# Patient Record
Sex: FEMALE | Race: WHITE | Employment: OTHER | URBAN - METROPOLITAN AREA
[De-identification: names, ages, dates, MRNs, and addresses within clinical notes are randomized per-mention and may not be internally consistent; named-entity substitution may affect disease eponyms.]

---

## 2017-01-20 PROBLEM — I25.10 CORONARY ARTERY DISEASE INVOLVING NATIVE CORONARY ARTERY OF NATIVE HEART WITHOUT ANGINA PECTORIS: Status: ACTIVE | Noted: 2017-01-20

## 2017-01-20 PROBLEM — R01.1 HEART MURMUR: Status: ACTIVE | Noted: 2017-01-20

## 2017-01-20 PROBLEM — I10 ESSENTIAL HYPERTENSION: Status: ACTIVE | Noted: 2017-01-20

## 2017-01-20 PROBLEM — N18.9 CKD (CHRONIC KIDNEY DISEASE): Status: ACTIVE | Noted: 2017-01-20

## 2017-01-20 PROBLEM — R07.89 CHEST PAIN, ATYPICAL: Status: ACTIVE | Noted: 2017-01-20

## 2017-02-24 PROBLEM — Z88.8 ASPIRIN ALLERGY: Status: ACTIVE | Noted: 2017-02-24

## 2017-02-24 PROBLEM — Z88.8 ALLERGY TO STATIN MEDICATION: Status: ACTIVE | Noted: 2017-02-24

## 2017-11-02 PROBLEM — E66.811 CLASS 1 OBESITY DUE TO EXCESS CALORIES WITHOUT SERIOUS COMORBIDITY IN ADULT: Status: ACTIVE | Noted: 2017-11-02

## 2017-11-02 PROBLEM — I31.39 PERICARDIAL EFFUSION: Status: ACTIVE | Noted: 2017-11-02

## 2017-11-02 PROBLEM — E66.09 CLASS 1 OBESITY DUE TO EXCESS CALORIES WITHOUT SERIOUS COMORBIDITY IN ADULT: Status: ACTIVE | Noted: 2017-11-02

## 2017-11-02 PROBLEM — I34.0 NON-RHEUMATIC MITRAL REGURGITATION: Status: ACTIVE | Noted: 2017-11-02

## 2018-09-05 ENCOUNTER — OFFICE VISIT (OUTPATIENT)
Dept: CARDIOLOGY CLINIC | Age: 73
End: 2018-09-05
Payer: MEDICARE

## 2018-09-05 VITALS
DIASTOLIC BLOOD PRESSURE: 60 MMHG | HEART RATE: 60 BPM | WEIGHT: 209 LBS | SYSTOLIC BLOOD PRESSURE: 132 MMHG | HEIGHT: 64 IN | RESPIRATION RATE: 18 BRPM | BODY MASS INDEX: 35.68 KG/M2

## 2018-09-05 DIAGNOSIS — R60.0 EDEMA OF BOTH FEET: ICD-10-CM

## 2018-09-05 DIAGNOSIS — I34.0 NON-RHEUMATIC MITRAL REGURGITATION: ICD-10-CM

## 2018-09-05 DIAGNOSIS — Z88.8 ALLERGY TO STATIN MEDICATION: ICD-10-CM

## 2018-09-05 DIAGNOSIS — I31.39 PERICARDIAL EFFUSION: ICD-10-CM

## 2018-09-05 DIAGNOSIS — E66.9 NON MORBID OBESITY: ICD-10-CM

## 2018-09-05 DIAGNOSIS — I10 ESSENTIAL HYPERTENSION: Primary | ICD-10-CM

## 2018-09-05 PROCEDURE — 1036F TOBACCO NON-USER: CPT | Performed by: INTERNAL MEDICINE

## 2018-09-05 PROCEDURE — G8400 PT W/DXA NO RESULTS DOC: HCPCS | Performed by: INTERNAL MEDICINE

## 2018-09-05 PROCEDURE — 1090F PRES/ABSN URINE INCON ASSESS: CPT | Performed by: INTERNAL MEDICINE

## 2018-09-05 PROCEDURE — 1123F ACP DISCUSS/DSCN MKR DOCD: CPT | Performed by: INTERNAL MEDICINE

## 2018-09-05 PROCEDURE — 3017F COLORECTAL CA SCREEN DOC REV: CPT | Performed by: INTERNAL MEDICINE

## 2018-09-05 PROCEDURE — G8427 DOCREV CUR MEDS BY ELIG CLIN: HCPCS | Performed by: INTERNAL MEDICINE

## 2018-09-05 PROCEDURE — 4040F PNEUMOC VAC/ADMIN/RCVD: CPT | Performed by: INTERNAL MEDICINE

## 2018-09-05 PROCEDURE — 93000 ELECTROCARDIOGRAM COMPLETE: CPT | Performed by: INTERNAL MEDICINE

## 2018-09-05 PROCEDURE — 1101F PT FALLS ASSESS-DOCD LE1/YR: CPT | Performed by: INTERNAL MEDICINE

## 2018-09-05 PROCEDURE — 99214 OFFICE O/P EST MOD 30 MIN: CPT | Performed by: INTERNAL MEDICINE

## 2018-09-05 PROCEDURE — G8417 CALC BMI ABV UP PARAM F/U: HCPCS | Performed by: INTERNAL MEDICINE

## 2018-09-05 NOTE — PROGRESS NOTES
OFFICE VISIT     PRIMARY CARE PHYSICIAN:      Gurvinder Mcneal DO       ALLERGIES / SENSITIVITIES:        Allergies   Allergen Reactions    Latex     Aspirin     Andrés-Allergin [Diphenhydramine]     Darvocet A500 [Propoxyphene N-Acetaminophen]     Fish-Derived Products     Honey Bee Venom [Bee Venom]     Lemon Oil     Niacin And Related     Nuts [Peanut Oil]     Penicillins     Shellfish-Derived Products     Wine [Alcohol]      Beer, wine and diet pop          REVIEWED MEDICATIONS:        Current Outpatient Prescriptions:     cetirizine (ZYRTEC) 10 MG tablet, Take 10 mg by mouth daily as needed for Allergies, Disp: , Rfl:     carvedilol (COREG) 12.5 MG tablet, Take 12.5 mg by mouth 2 times daily, Disp: , Rfl:     amLODIPine (NORVASC) 10 MG tablet, Take 10 mg by mouth daily, Disp: , Rfl:       S: REASON FOR VISIT:       Chief Complaint   Patient presents with    Cardiac Valve Problem     10 mo ov    Hypertension          History of Present Illness:       Office Visit for follow up of VHD, HTN, Pericardial effusion     No hospitalizations or surgeries since last visit   Rob any exertional chest pain or short of breath   No palpitations, dizzy or syncope. Active at home   No orthopnea   Try to watch diet   Compliant with all medications       Past Medical History:   Diagnosis Date    Hyperlipidemia     Hypertension     Thyroid disease             Past Surgical History:   Procedure Laterality Date    CARPAL TUNNEL RELEASE      CATARACT REMOVAL      CHOLECYSTECTOMY      FRACTURE SURGERY      HYSTERECTOMY      SKIN CANCER EXCISION      rt arm        No family history on file.        Social History   Substance Use Topics    Smoking status: Never Smoker    Smokeless tobacco: Never Used    Alcohol use No      Comment: drinks 2 cups of coffee daily         Review of Systems:  HEENT: negative for acute visual symptoms or auditory problems, no dysphagia  Constitutional: negative for fever and chills, or significant weight loss  Respiratory: negative for cough, wheezing, or hemoptysis  Cardiovascular: negative for chest pain, palpitations, and dyspnea  Gastrointestinal: negative for abdominal pain, diarrhea, nausea and vomiting  Endocrine: Negative for polyuria and polydyspsia  Genitourinary:negative for dysuria and hematuria  Derm: negative for rash and skin lesion(s)  Neurological: negative for tingling, numbness, weakness, seizures and tremors  Endocrine: negative for polydipsia and polyuria  Musculoskeletal: negative for pain or tenderness  Psychiatric: negative for anxiety, depression, or suicidal ideations         O:  COMPLETE PHYSICAL EXAM:       /60   Pulse 60   Resp 18   Ht 5' 4\" (1.626 m)   Wt 209 lb (94.8 kg)   BMI 35.87 kg/m²       General:   Patient alert, comfortable, no distress. Appears stated age. HEENT:    Pupils equal, no icterus, no nasal drainage, tongue moist.   Neck:              No masses, Thyroid not palpable. Chest:   Normal configuration, non tender. Lungs:   Clear to auscultation bilaterally, few scattered rhonchi. Cardiovascular:  Regular rhythm, 1/6 systolic murmur, No S3, no palpable thrills, No elevated JVD, No carotid bruit. Abdomen:  Soft, Non tender, Bowel sounds normal, no pulsatile abdominal aorta,. Extremities:  Nonpitting edema. Distal pulses palpable. No cyanosis, no clubbing. Skin:   Good turgor, warm and dry, no cyanosis. Musculoskeletal: No joint swelling or deformity. Neuro:   Cranial nerves grossly intact; No focal neurologic deficit. Psych:   Alert, good mood and effect. REVIEW OF DIAGNOSTIC TESTS:        Electrocardiogram: NSR            A/P:   ASSESSMENT / PLAN:    Jono Flores was seen today for cardiac valve problem and hypertension.     Diagnoses and all orders for this visit:       Non-rheumatic mitral regurgitation, Mild, stable   -     EKG 12 Lead    Pericardial effusion, Small     Edema of both feet, chronic; elevated feet, decrease salt     Aspirin allergy     Allergy to statin medication    Non morbid obesity : Diet, exercise and weight loss discussed. Preventive Cardiology: Low cholesterol diet, regular exercise as tolerate, and gradual weight loss discussed. Monitor BP and heart rates. All questions answered about cardiac diagnoses and cardiac medications. Continue current medications. Compliance with medications and f/u with all physicians discussed. Risk factor modification based on risk profile discussed. Call if any exertional chest pain, short of breath, dizzy or palpitations   Follow up in 9 months or earlier if needed.          [facility]  [address], [place], [address] [place] [address]  [phone]

## 2019-04-18 ENCOUNTER — OFFICE VISIT (OUTPATIENT)
Dept: CARDIOLOGY CLINIC | Age: 74
End: 2019-04-18
Payer: MEDICARE

## 2019-04-18 VITALS
DIASTOLIC BLOOD PRESSURE: 70 MMHG | HEIGHT: 64 IN | BODY MASS INDEX: 37.22 KG/M2 | HEART RATE: 60 BPM | WEIGHT: 218 LBS | SYSTOLIC BLOOD PRESSURE: 128 MMHG

## 2019-04-18 DIAGNOSIS — I34.0 NON-RHEUMATIC MITRAL REGURGITATION: ICD-10-CM

## 2019-04-18 DIAGNOSIS — R60.0 EDEMA OF BOTH FEET: ICD-10-CM

## 2019-04-18 DIAGNOSIS — I31.39 PERICARDIAL EFFUSION: ICD-10-CM

## 2019-04-18 DIAGNOSIS — I10 ESSENTIAL HYPERTENSION: Primary | ICD-10-CM

## 2019-04-18 DIAGNOSIS — E66.9 NON MORBID OBESITY: ICD-10-CM

## 2019-04-18 DIAGNOSIS — N18.9 CHRONIC KIDNEY DISEASE, UNSPECIFIED CKD STAGE: ICD-10-CM

## 2019-04-18 PROCEDURE — G8400 PT W/DXA NO RESULTS DOC: HCPCS | Performed by: INTERNAL MEDICINE

## 2019-04-18 PROCEDURE — G8427 DOCREV CUR MEDS BY ELIG CLIN: HCPCS | Performed by: INTERNAL MEDICINE

## 2019-04-18 PROCEDURE — 1036F TOBACCO NON-USER: CPT | Performed by: INTERNAL MEDICINE

## 2019-04-18 PROCEDURE — 99214 OFFICE O/P EST MOD 30 MIN: CPT | Performed by: INTERNAL MEDICINE

## 2019-04-18 PROCEDURE — G8417 CALC BMI ABV UP PARAM F/U: HCPCS | Performed by: INTERNAL MEDICINE

## 2019-04-18 PROCEDURE — 4040F PNEUMOC VAC/ADMIN/RCVD: CPT | Performed by: INTERNAL MEDICINE

## 2019-04-18 PROCEDURE — 1123F ACP DISCUSS/DSCN MKR DOCD: CPT | Performed by: INTERNAL MEDICINE

## 2019-04-18 PROCEDURE — 3017F COLORECTAL CA SCREEN DOC REV: CPT | Performed by: INTERNAL MEDICINE

## 2019-04-18 PROCEDURE — 93000 ELECTROCARDIOGRAM COMPLETE: CPT | Performed by: INTERNAL MEDICINE

## 2019-04-18 PROCEDURE — 1090F PRES/ABSN URINE INCON ASSESS: CPT | Performed by: INTERNAL MEDICINE

## 2019-04-18 NOTE — PROGRESS NOTES
OFFICE VISIT     PRIMARY CARE PHYSICIAN:      Bettina Finn DO       ALLERGIES / SENSITIVITIES:        Allergies   Allergen Reactions    Latex     Aspirin     Andrés-Allergin [Diphenhydramine]     Darvocet A500 [Propoxyphene N-Acetaminophen]     Fish-Derived Products     Honey Bee Venom [Bee Venom]     Lemon Oil     Niacin And Related     Nuts [Peanut Oil]     Penicillins     Shellfish-Derived Products     Wine [Alcohol]      Beer, wine and diet pop          REVIEWED MEDICATIONS:        Current Outpatient Medications:     cetirizine (ZYRTEC) 10 MG tablet, Take 10 mg by mouth daily as needed for Allergies, Disp: , Rfl:     carvedilol (COREG) 12.5 MG tablet, Take 12.5 mg by mouth 2 times daily, Disp: , Rfl:     amLODIPine (NORVASC) 10 MG tablet, Take 10 mg by mouth daily, Disp: , Rfl:       S: REASON FOR VISIT:       Chief Complaint   Patient presents with    Hypertension     9 month. Pt has no cardiac complaints today          History of Present Illness:       Office Visit for follow up of HTN, VHDF, Edema     No hospitalizations or surgeries since last visit     Rob any exertional chest pain or short of breath   No palpitations, dizzy or syncope. Active at home   No orthopnea   Try to watch diet   Compliant with all medications       Past Medical History:   Diagnosis Date    Hyperlipidemia     Hypertension     Thyroid disease             Past Surgical History:   Procedure Laterality Date    CARPAL TUNNEL RELEASE      CATARACT REMOVAL      CHOLECYSTECTOMY      FRACTURE SURGERY      HYSTERECTOMY      SKIN CANCER EXCISION      rt arm        History reviewed. No pertinent family history.        Social History     Tobacco Use    Smoking status: Never Smoker    Smokeless tobacco: Never Used   Substance Use Topics    Alcohol use: No     Comment: drinks 2 cups of coffee daily         Review of Systems:  HEENT: negative for acute visual symptoms or auditory problems, no dysphagia  Constitutional: negative for fever and chills, or significant weight loss  Respiratory: negative for cough, wheezing, or hemoptysis  Cardiovascular: negative for chest pain, palpitations, and dyspnea  Gastrointestinal: negative for abdominal pain, diarrhea, nausea and vomiting  Endocrine: Negative for polyuria and polydyspsia  Genitourinary:negative for dysuria and hematuria  Derm: negative for rash and skin lesion(s)  Neurological: negative for tingling, numbness, weakness, seizures and tremors  Endocrine: negative for polydipsia and polyuria  Musculoskeletal: negative for pain or tenderness  Psychiatric: negative for anxiety, depression, or suicidal ideations         O:  COMPLETE PHYSICAL EXAM:       /70   Pulse 60   Ht 5' 4\" (1.626 m)   Wt 218 lb (98.9 kg)   BMI 37.42 kg/m²       General:   Patient alert, comfortable, no distress. Appears stated age. HEENT:    Pupils equal, no icterus, no nasal drainage, tongue moist.   Neck:              No masses, Thyroid not palpable. Chest:   Normal configuration, non tender. Lungs:   Clear to auscultation bilaterally, few scattered rhonchi. Cardiovascular:  Regular rhythm, 1/6 systolic murmur, No S3, no palpable thrills, No elevated JVD, No carotid bruit. Abdomen:  Soft, Non tender, Bowel sounds normal, no pulsatile abdominal aorta, no palpable masses. Extremities:  + edema. Distal pulses palpable. No cyanosis, no clubbing. Skin:   Good turgor, warm and dry, no cyanosis. Musculoskeletal: No joint swelling or deformity. Neuro:   Cranial nerves grossly intact; No focal neurologic deficit. Psych:   Alert, good mood and effect. REVIEW OF DIAGNOSTIC TESTS:        Electrocardiogram: NSR, nonspecific ST/T changes            A/P:   ASSESSMENT / PLAN:    Niraj Segundo was seen today for hypertension.     Diagnoses and all orders for this visit:    Essential hypertension - Stable  -     EKG 12 Lead    Non-rheumatic mitral regurgitation - Mild, Echo 2/3017 - Stable    Pericardial effusion - Small, - Stable    Edema of both feet - Chronic - Elevate feet, decrease salt intake    Non morbid obesity - Diet, exercise and weight loss discussed. Chronic kidney disease, unspecified CKD stage    Preventive Cardiology: Low cholesterol diet, regular exercise as tolerate, and gradual weight loss discussed. Monitor BP and heart rates. All questions answered about cardiac diagnoses and cardiac medications. Continue current medications. Compliance with medications and f/u with all physicians discussed. Risk factor modification based on risk profile discussed. Call if any exertional chest pain, short of breath, dizzy or palpitations   Follow up in 12  months or earlier if needed.          [facility]  [address] [facility] [address], [place], [address] [facility]  [phone]

## 2021-05-05 ENCOUNTER — APPOINTMENT (OUTPATIENT)
Dept: GENERAL RADIOLOGY | Age: 76
DRG: 247 | End: 2021-05-05
Payer: MEDICARE

## 2021-05-05 ENCOUNTER — HOSPITAL ENCOUNTER (INPATIENT)
Age: 76
LOS: 2 days | Discharge: HOME | DRG: 247 | End: 2021-05-08
Attending: EMERGENCY MEDICINE | Admitting: FAMILY MEDICINE
Payer: MEDICARE

## 2021-05-05 DIAGNOSIS — R07.9 CHEST PAIN, UNSPECIFIED TYPE: Primary | ICD-10-CM

## 2021-05-05 LAB
ALBUMIN SERPL-MCNC: 4 G/DL (ref 3.5–5.2)
ALP BLD-CCNC: 81 U/L (ref 35–104)
ALT SERPL-CCNC: 12 U/L (ref 0–32)
ANION GAP SERPL CALCULATED.3IONS-SCNC: 10 MMOL/L (ref 7–16)
AST SERPL-CCNC: 22 U/L (ref 0–31)
BASOPHILS ABSOLUTE: 0.03 E9/L (ref 0–0.2)
BASOPHILS RELATIVE PERCENT: 0.4 % (ref 0–2)
BILIRUB SERPL-MCNC: <0.2 MG/DL (ref 0–1.2)
BUN BLDV-MCNC: 18 MG/DL (ref 6–23)
CALCIUM SERPL-MCNC: 8.9 MG/DL (ref 8.6–10.2)
CHLORIDE BLD-SCNC: 105 MMOL/L (ref 98–107)
CO2: 21 MMOL/L (ref 22–29)
CREAT SERPL-MCNC: 1 MG/DL (ref 0.5–1)
EOSINOPHILS ABSOLUTE: 0.3 E9/L (ref 0.05–0.5)
EOSINOPHILS RELATIVE PERCENT: 4 % (ref 0–6)
GFR AFRICAN AMERICAN: >60
GFR NON-AFRICAN AMERICAN: 54 ML/MIN/1.73
GLUCOSE BLD-MCNC: 168 MG/DL (ref 74–99)
HCT VFR BLD CALC: 38.3 % (ref 34–48)
HEMOGLOBIN: 12.3 G/DL (ref 11.5–15.5)
IMMATURE GRANULOCYTES #: 0.03 E9/L
IMMATURE GRANULOCYTES %: 0.4 % (ref 0–5)
INR BLD: 0.9
LYMPHOCYTES ABSOLUTE: 2.28 E9/L (ref 1.5–4)
LYMPHOCYTES RELATIVE PERCENT: 30.5 % (ref 20–42)
MCH RBC QN AUTO: 30.1 PG (ref 26–35)
MCHC RBC AUTO-ENTMCNC: 32.1 % (ref 32–34.5)
MCV RBC AUTO: 93.9 FL (ref 80–99.9)
MONOCYTES ABSOLUTE: 0.66 E9/L (ref 0.1–0.95)
MONOCYTES RELATIVE PERCENT: 8.8 % (ref 2–12)
NEUTROPHILS ABSOLUTE: 4.18 E9/L (ref 1.8–7.3)
NEUTROPHILS RELATIVE PERCENT: 55.9 % (ref 43–80)
PDW BLD-RTO: 13.8 FL (ref 11.5–15)
PLATELET # BLD: 185 E9/L (ref 130–450)
PMV BLD AUTO: 9.6 FL (ref 7–12)
POTASSIUM SERPL-SCNC: 5 MMOL/L (ref 3.5–5)
PRO-BNP: 466 PG/ML (ref 0–450)
PROTHROMBIN TIME: 10.2 SEC (ref 9.3–12.4)
RBC # BLD: 4.08 E12/L (ref 3.5–5.5)
SODIUM BLD-SCNC: 136 MMOL/L (ref 132–146)
TOTAL PROTEIN: 7.1 G/DL (ref 6.4–8.3)
TROPONIN: <0.01 NG/ML (ref 0–0.03)
WBC # BLD: 7.5 E9/L (ref 4.5–11.5)

## 2021-05-05 PROCEDURE — 83880 ASSAY OF NATRIURETIC PEPTIDE: CPT

## 2021-05-05 PROCEDURE — 85610 PROTHROMBIN TIME: CPT

## 2021-05-05 PROCEDURE — 99283 EMERGENCY DEPT VISIT LOW MDM: CPT

## 2021-05-05 PROCEDURE — 93005 ELECTROCARDIOGRAM TRACING: CPT | Performed by: EMERGENCY MEDICINE

## 2021-05-05 PROCEDURE — 71045 X-RAY EXAM CHEST 1 VIEW: CPT

## 2021-05-05 PROCEDURE — 80053 COMPREHEN METABOLIC PANEL: CPT

## 2021-05-05 PROCEDURE — 85025 COMPLETE CBC W/AUTO DIFF WBC: CPT

## 2021-05-05 PROCEDURE — G0378 HOSPITAL OBSERVATION PER HR: HCPCS

## 2021-05-05 PROCEDURE — 84484 ASSAY OF TROPONIN QUANT: CPT

## 2021-05-05 ASSESSMENT — PAIN DESCRIPTION - LOCATION: LOCATION: CHEST

## 2021-05-05 ASSESSMENT — PAIN DESCRIPTION - DESCRIPTORS: DESCRIPTORS: PRESSURE

## 2021-05-05 NOTE — Clinical Note
Patient Class: Observation [104]   REQUIRED: Diagnosis: Chest pain [350375]   Estimated Length of Stay: Estimated stay of less than 2 midnights   Telemetry/Cardiac Monitoring Required?: Yes

## 2021-05-06 PROBLEM — E03.9 HYPOTHYROID: Status: ACTIVE | Noted: 2021-05-06

## 2021-05-06 PROBLEM — Z88.9 ALLERGY TO MULTIPLE DRUGS: Status: ACTIVE | Noted: 2021-05-06

## 2021-05-06 PROBLEM — I21.4 NSTEMI (NON-ST ELEVATED MYOCARDIAL INFARCTION) (HCC): Status: ACTIVE | Noted: 2021-05-06

## 2021-05-06 PROBLEM — E87.20 METABOLIC ACIDOSIS: Status: ACTIVE | Noted: 2021-05-06

## 2021-05-06 PROBLEM — I51.89 GRADE I DIASTOLIC DYSFUNCTION: Status: ACTIVE | Noted: 2021-05-06

## 2021-05-06 PROBLEM — E78.5 HLD (HYPERLIPIDEMIA): Status: ACTIVE | Noted: 2021-05-06

## 2021-05-06 PROBLEM — E66.9 CLASS 1 OBESITY IN ADULT: Status: ACTIVE | Noted: 2017-11-02

## 2021-05-06 LAB
ANION GAP SERPL CALCULATED.3IONS-SCNC: 11 MMOL/L (ref 7–16)
APTT: 46.8 SEC (ref 24.5–35.1)
APTT: 91.3 SEC (ref 24.5–35.1)
BUN BLDV-MCNC: 16 MG/DL (ref 6–23)
CALCIUM SERPL-MCNC: 9.1 MG/DL (ref 8.6–10.2)
CHLORIDE BLD-SCNC: 105 MMOL/L (ref 98–107)
CHOLESTEROL, TOTAL: 215 MG/DL (ref 0–199)
CK MB: 3.3 NG/ML (ref 0–4.3)
CK MB: 4.7 NG/ML (ref 0–4.3)
CO2: 25 MMOL/L (ref 22–29)
CREAT SERPL-MCNC: 1 MG/DL (ref 0.5–1)
D DIMER: 278 NG/ML DDU
EKG ATRIAL RATE: 74 BPM
EKG P AXIS: 76 DEGREES
EKG P-R INTERVAL: 168 MS
EKG Q-T INTERVAL: 404 MS
EKG QRS DURATION: 88 MS
EKG QTC CALCULATION (BAZETT): 448 MS
EKG R AXIS: 47 DEGREES
EKG T AXIS: 33 DEGREES
EKG VENTRICULAR RATE: 74 BPM
GFR AFRICAN AMERICAN: >60
GFR NON-AFRICAN AMERICAN: 54 ML/MIN/1.73
GLUCOSE BLD-MCNC: 133 MG/DL (ref 74–99)
HBA1C MFR BLD: 6.2 % (ref 4–5.6)
HCT VFR BLD CALC: 41 % (ref 34–48)
HDLC SERPL-MCNC: 36 MG/DL
HEMOGLOBIN: 13.3 G/DL (ref 11.5–15.5)
LDL CHOLESTEROL CALCULATED: 115 MG/DL (ref 0–99)
MCH RBC QN AUTO: 30 PG (ref 26–35)
MCHC RBC AUTO-ENTMCNC: 32.4 % (ref 32–34.5)
MCV RBC AUTO: 92.6 FL (ref 80–99.9)
PDW BLD-RTO: 13.6 FL (ref 11.5–15)
PLATELET # BLD: 196 E9/L (ref 130–450)
PMV BLD AUTO: 9.5 FL (ref 7–12)
POTASSIUM REFLEX MAGNESIUM: 4 MMOL/L (ref 3.5–5)
RBC # BLD: 4.43 E12/L (ref 3.5–5.5)
SODIUM BLD-SCNC: 141 MMOL/L (ref 132–146)
TOTAL CK: 54 U/L (ref 20–180)
TRIGL SERPL-MCNC: 319 MG/DL (ref 0–149)
TROPONIN: 0.06 NG/ML (ref 0–0.03)
TROPONIN: 0.07 NG/ML (ref 0–0.03)
TROPONIN: 0.09 NG/ML (ref 0–0.03)
TSH SERPL DL<=0.05 MIU/L-ACNC: 2.63 UIU/ML (ref 0.27–4.2)
VLDLC SERPL CALC-MCNC: 64 MG/DL
WBC # BLD: 7.3 E9/L (ref 4.5–11.5)

## 2021-05-06 PROCEDURE — 84484 ASSAY OF TROPONIN QUANT: CPT

## 2021-05-06 PROCEDURE — 99222 1ST HOSP IP/OBS MODERATE 55: CPT | Performed by: INTERNAL MEDICINE

## 2021-05-06 PROCEDURE — 82550 ASSAY OF CK (CPK): CPT

## 2021-05-06 PROCEDURE — 6370000000 HC RX 637 (ALT 250 FOR IP): Performed by: FAMILY MEDICINE

## 2021-05-06 PROCEDURE — 85378 FIBRIN DEGRADE SEMIQUANT: CPT

## 2021-05-06 PROCEDURE — APPSS45 APP SPLIT SHARED TIME 31-45 MINUTES: Performed by: NURSE PRACTITIONER

## 2021-05-06 PROCEDURE — 2060000000 HC ICU INTERMEDIATE R&B

## 2021-05-06 PROCEDURE — 85027 COMPLETE CBC AUTOMATED: CPT

## 2021-05-06 PROCEDURE — 85730 THROMBOPLASTIN TIME PARTIAL: CPT

## 2021-05-06 PROCEDURE — 6360000002 HC RX W HCPCS: Performed by: NURSE PRACTITIONER

## 2021-05-06 PROCEDURE — 82553 CREATINE MB FRACTION: CPT

## 2021-05-06 PROCEDURE — 2580000003 HC RX 258: Performed by: FAMILY MEDICINE

## 2021-05-06 PROCEDURE — 83036 HEMOGLOBIN GLYCOSYLATED A1C: CPT

## 2021-05-06 PROCEDURE — 96366 THER/PROPH/DIAG IV INF ADDON: CPT

## 2021-05-06 PROCEDURE — 6370000000 HC RX 637 (ALT 250 FOR IP): Performed by: NURSE PRACTITIONER

## 2021-05-06 PROCEDURE — 36415 COLL VENOUS BLD VENIPUNCTURE: CPT

## 2021-05-06 PROCEDURE — 80048 BASIC METABOLIC PNL TOTAL CA: CPT

## 2021-05-06 PROCEDURE — 84443 ASSAY THYROID STIM HORMONE: CPT

## 2021-05-06 PROCEDURE — 93010 ELECTROCARDIOGRAM REPORT: CPT | Performed by: INTERNAL MEDICINE

## 2021-05-06 PROCEDURE — 96365 THER/PROPH/DIAG IV INF INIT: CPT

## 2021-05-06 PROCEDURE — 80061 LIPID PANEL: CPT

## 2021-05-06 RX ORDER — FLUTICASONE PROPIONATE 50 MCG
1 SPRAY, SUSPENSION (ML) NASAL 2 TIMES DAILY
COMMUNITY
End: 2022-10-10

## 2021-05-06 RX ORDER — PROMETHAZINE HYDROCHLORIDE 25 MG/1
12.5 TABLET ORAL EVERY 6 HOURS PRN
Status: DISCONTINUED | OUTPATIENT
Start: 2021-05-06 | End: 2021-05-08 | Stop reason: HOSPADM

## 2021-05-06 RX ORDER — HEPARIN SODIUM 1000 [USP'U]/ML
4000 INJECTION, SOLUTION INTRAVENOUS; SUBCUTANEOUS PRN
Status: DISCONTINUED | OUTPATIENT
Start: 2021-05-06 | End: 2021-05-07

## 2021-05-06 RX ORDER — ATORVASTATIN CALCIUM 40 MG/1
40 TABLET, FILM COATED ORAL NIGHTLY
Status: DISCONTINUED | OUTPATIENT
Start: 2021-05-06 | End: 2021-05-08 | Stop reason: HOSPADM

## 2021-05-06 RX ORDER — POLYETHYLENE GLYCOL 3350 17 G/17G
17 POWDER, FOR SOLUTION ORAL DAILY PRN
Status: DISCONTINUED | OUTPATIENT
Start: 2021-05-06 | End: 2021-05-08 | Stop reason: HOSPADM

## 2021-05-06 RX ORDER — HEPARIN SODIUM 1000 [USP'U]/ML
4000 INJECTION, SOLUTION INTRAVENOUS; SUBCUTANEOUS ONCE
Status: COMPLETED | OUTPATIENT
Start: 2021-05-06 | End: 2021-05-06

## 2021-05-06 RX ORDER — CLOPIDOGREL BISULFATE 75 MG/1
75 TABLET ORAL ONCE
Status: COMPLETED | OUTPATIENT
Start: 2021-05-06 | End: 2021-05-06

## 2021-05-06 RX ORDER — HEPARIN SODIUM 1000 [USP'U]/ML
2000 INJECTION, SOLUTION INTRAVENOUS; SUBCUTANEOUS PRN
Status: DISCONTINUED | OUTPATIENT
Start: 2021-05-06 | End: 2021-05-07

## 2021-05-06 RX ORDER — NITROGLYCERIN 0.4 MG/1
0.4 TABLET SUBLINGUAL EVERY 5 MIN PRN
Status: DISCONTINUED | OUTPATIENT
Start: 2021-05-06 | End: 2021-05-07 | Stop reason: SDUPTHER

## 2021-05-06 RX ORDER — ONDANSETRON 2 MG/ML
4 INJECTION INTRAMUSCULAR; INTRAVENOUS EVERY 6 HOURS PRN
Status: DISCONTINUED | OUTPATIENT
Start: 2021-05-06 | End: 2021-05-08 | Stop reason: HOSPADM

## 2021-05-06 RX ORDER — CARVEDILOL 6.25 MG/1
12.5 TABLET ORAL 2 TIMES DAILY WITH MEALS
Status: DISCONTINUED | OUTPATIENT
Start: 2021-05-06 | End: 2021-05-07

## 2021-05-06 RX ORDER — METOPROLOL SUCCINATE 25 MG/1
25 TABLET, EXTENDED RELEASE ORAL DAILY
Status: DISCONTINUED | OUTPATIENT
Start: 2021-05-06 | End: 2021-05-07

## 2021-05-06 RX ORDER — AMLODIPINE BESYLATE 5 MG/1
10 TABLET ORAL DAILY
Status: DISCONTINUED | OUTPATIENT
Start: 2021-05-06 | End: 2021-05-08 | Stop reason: HOSPADM

## 2021-05-06 RX ORDER — SODIUM CHLORIDE 9 MG/ML
25 INJECTION, SOLUTION INTRAVENOUS PRN
Status: DISCONTINUED | OUTPATIENT
Start: 2021-05-06 | End: 2021-05-07 | Stop reason: SDUPTHER

## 2021-05-06 RX ORDER — SODIUM CHLORIDE 0.9 % (FLUSH) 0.9 %
5-40 SYRINGE (ML) INJECTION PRN
Status: DISCONTINUED | OUTPATIENT
Start: 2021-05-06 | End: 2021-05-07 | Stop reason: SDUPTHER

## 2021-05-06 RX ORDER — SODIUM CHLORIDE 0.9 % (FLUSH) 0.9 %
5-40 SYRINGE (ML) INJECTION EVERY 12 HOURS SCHEDULED
Status: DISCONTINUED | OUTPATIENT
Start: 2021-05-06 | End: 2021-05-07 | Stop reason: SDUPTHER

## 2021-05-06 RX ORDER — HEPARIN SODIUM 10000 [USP'U]/100ML
5-30 INJECTION, SOLUTION INTRAVENOUS CONTINUOUS
Status: DISCONTINUED | OUTPATIENT
Start: 2021-05-06 | End: 2021-05-07

## 2021-05-06 RX ADMIN — HEPARIN SODIUM 10.3 UNITS/KG/HR: 10000 INJECTION, SOLUTION INTRAVENOUS at 11:32

## 2021-05-06 RX ADMIN — Medication 10 ML: at 20:47

## 2021-05-06 RX ADMIN — Medication 10 ML: at 11:31

## 2021-05-06 RX ADMIN — HEPARIN SODIUM 2000 UNITS: 1000 INJECTION INTRAVENOUS; SUBCUTANEOUS at 17:52

## 2021-05-06 RX ADMIN — HEPARIN SODIUM 4000 UNITS: 1000 INJECTION INTRAVENOUS; SUBCUTANEOUS at 11:26

## 2021-05-06 RX ADMIN — CARVEDILOL 12.5 MG: 6.25 TABLET, FILM COATED ORAL at 11:24

## 2021-05-06 RX ADMIN — ATORVASTATIN CALCIUM 40 MG: 40 TABLET, FILM COATED ORAL at 20:47

## 2021-05-06 RX ADMIN — AMLODIPINE BESYLATE 10 MG: 5 TABLET ORAL at 11:23

## 2021-05-06 RX ADMIN — CLOPIDOGREL BISULFATE 75 MG: 75 TABLET ORAL at 11:24

## 2021-05-06 RX ADMIN — CARVEDILOL 12.5 MG: 6.25 TABLET, FILM COATED ORAL at 20:46

## 2021-05-06 RX ADMIN — METOPROLOL SUCCINATE 25 MG: 25 TABLET, FILM COATED, EXTENDED RELEASE ORAL at 11:23

## 2021-05-06 NOTE — ED NOTES
Message left with stress test to determine time she will be going as pt states she is hungry.       Lata Cruz RN  05/06/21 6764

## 2021-05-06 NOTE — ED NOTES
Bed: 13  Expected date:   Expected time:   Means of arrival:   Comments:  ems     Cheryle Axe, RN  05/05/21 3251

## 2021-05-06 NOTE — ED NOTES
Spoke with daughter via phone.   States she does not have pt med list with her and that she would have to go to her mother's house to get her list.  States she is about to go to work but planned on coming to the hospital afterwards, around 1300 with her med list.  Will require follow up when daughter is able to provide list     Cesar Aragon RN  05/06/21 2273

## 2021-05-06 NOTE — ED NOTES
Pt updated that there is no room available and was nursing error.  Pt family not happy     Amaris Weller, RN  05/06/21 4862

## 2021-05-06 NOTE — CONSULTS
tricuspid regurgitation and mild pulmonary hypertension with RVSP 44 mmHg and a small pericardial effusion  7. Thyroid disease  8. Chronic kidney disease  9. Carpal tunnel release  10. Skin cancer  11. Cholecystectomy  12. Cataract removal  13. Hysterectomy  14. Aspirin allergy with edema of the lips  15. Psoriasis      Medications Prior to admit: States she takes a cholesterol medication but does not know the name  Prior to Admission medications    Medication Sig Start Date End Date Taking?  Authorizing Provider   cetirizine (ZYRTEC) 10 MG tablet Take 10 mg by mouth daily as needed for Allergies    Historical Provider, MD   carvedilol (COREG) 12.5 MG tablet Take 12.5 mg by mouth 2 times daily    Historical Provider, MD   amLODIPine (NORVASC) 10 MG tablet Take 10 mg by mouth daily    Historical Provider, MD       Current Medications:    Current Facility-Administered Medications: amLODIPine (NORVASC) tablet 10 mg, 10 mg, Oral, Daily  carvedilol (COREG) tablet 12.5 mg, 12.5 mg, Oral, BID WC  sodium chloride flush 0.9 % injection 5-40 mL, 5-40 mL, Intravenous, 2 times per day  sodium chloride flush 0.9 % injection 5-40 mL, 5-40 mL, Intravenous, PRN  0.9 % sodium chloride infusion, 25 mL, Intravenous, PRN  promethazine (PHENERGAN) tablet 12.5 mg, 12.5 mg, Oral, Q6H PRN **OR** ondansetron (ZOFRAN) injection 4 mg, 4 mg, Intravenous, Q6H PRN  polyethylene glycol (GLYCOLAX) packet 17 g, 17 g, Oral, Daily PRN  nitroGLYCERIN (NITROSTAT) SL tablet 0.4 mg, 0.4 mg, Sublingual, Q5 Min PRN  enoxaparin (LOVENOX) injection 40 mg, 40 mg, Subcutaneous, Daily  regadenoson (LEXISCAN) injection 0.4 mg, 0.4 mg, Intravenous, ONCE PRN    Allergies:  Latex, Aspirin, Andrés-allergin [diphenhydramine], Darvocet a500 [propoxyphene n-acetaminophen], Fish-derived products, Honey bee venom [bee venom], Lemon oil, Niacin and related, Nuts [peanut oil], Penicillins, Shellfish-derived products, and Wine [alcohol]    Social History: Non-smoker nondrinker      Family History: Noncontributory  History reviewed. No pertinent family history. REVIEW OF SYSTEMS:     · Constitutional: Denies fatigue, fevers, chills or night sweats  · Eyes: Denies visual changes or drainage  · ENT: Denies headaches or hearing loss. No mouth sores or sore throat. No epistaxis   · Cardiovascular: Denies chest pain, pressure or palpitations. No lower extremity swelling. · Respiratory: Denies COATES, cough, orthopnea or PND. No hemoptysis   · Gastrointestinal: Denies hematemesis or anorexia. No hematochezia or melena    · Genitourinary: Denies urgency, dysuria or hematuria. · Musculoskeletal: Denies gait disturbance, weakness or joint complaints  · Integumentary: Denies rash, hives or pruritis   · Neurological: Denies dizziness, headaches or seizures. No numbness or tingling  · Psychiatric: Denies anxiety or depression. · Endocrine: Denies temperature intolerance. No recent weight change. .  · Hematologic/Lymphatic: Denies abnormal bruising or bleeding. No swollen lymph nodes    PHYSICAL EXAM:   BP (!) 180/88   Pulse 67   Temp 98 °F (36.7 °C)   Resp 16   Ht 5' 4\" (1.626 m)   Wt 212 lb (96.2 kg)   SpO2 98%   BMI 36.39 kg/m²   CONST:  Well developed, well nourished who appears of stated age. Awake, alert and cooperative. No apparent distress. HEENT:   Head- Normocephalic, atraumatic   Eyes- Conjunctivae pink, anicteric  Throat- Oral mucosa pink and moist  Neck-  No stridor, trachea midline, no jugular venous distention. No carotid bruit. CHEST: Chest symmetrical and non-tender to palpation. No accessory muscle use or intercostal retractions  RESPIRATORY: Lung sounds - clear throughout fields   CARDIOVASCULAR:     Heart Inspection- shows no noted pulsations  Heart Palpation- no heaves or thrills; PMI is non-displaced   Heart Ausculation- Regular rate and rhythm, no murmur. No s3, s4 or rub   PV: No lower extremity edema. No varicosities.  Pedal pulses palpable, no clubbing or cyanosis   ABDOMEN: Soft, non-tender to light palpation. Bowel sounds present. No palpable masses no organomegaly; no abdominal bruit  MS: Good muscle strength and tone. No atrophy or abnormal movements. : Deferred  SKIN: Warm and dry no statis dermatitis or ulcers   NEURO / PSYCH: Oriented to person, place and time. Speech clear and appropriate. Follows all commands. Pleasant affect     DATA:    ECG / Tele strips: please see HPI   Diagnostic:    No intake or output data in the 24 hours ending 05/06/21 0740    Labs:   CBC:   Recent Labs     05/05/21 2211   WBC 7.5   HGB 12.3   HCT 38.3        BMP:   Recent Labs     05/05/21 2211 05/06/21  0636    141   K 5.0 4.0   CO2 21* 25   BUN 18 16   CREATININE 1.0 1.0   LABGLOM 54 54   CALCIUM 8.9 9.1     Mag: No results for input(s): MG in the last 72 hours. Phos: No results for input(s): PHOS in the last 72 hours. TFT:   Lab Results   Component Value Date    TSH 2.630 05/06/2021      HgA1c:   Lab Results   Component Value Date    LABA1C 6.2 (H) 05/06/2021     No results found for: EAG  proBNP:   Recent Labs     05/05/21 2211   PROBNP 466*     PT/INR:   Recent Labs     05/05/21 2211   PROTIME 10.2   INR 0.9     APTT:No results for input(s): APTT in the last 72 hours.   CARDIAC ENZYMES:  Recent Labs     05/05/21 2211 05/06/21  0122 05/06/21 0636   CKTOTAL  --  54  --    CKMB  --  3.3  --    TROPONINI <0.01  --  0.09*     FASTING LIPID PANEL:  Lab Results   Component Value Date    CHOL 215 05/06/2021    HDL 36 05/06/2021    LDLCALC 115 05/06/2021    TRIG 319 05/06/2021     LIVER PROFILE:  Recent Labs     05/05/21 2211   AST 22   ALT 12   LABALBU 4.0       Electronically signed by ANGELIQUE Ordonez CNP on 5/6/2021 at 7:40 AM     Addendum:  Katelin Cervantes MD     Reason for consult: CP, SOB, elevated Troponin     Patient previously known to me     History of Present illness: 66 yr old Adelso Silvia with history of Pulmonary HTN, Pericardial effusion, HTN admitted for chest pain and SOB. Saturday night she was getting ready for bed and as she was sitting she suddenly felt hot. She was a little dizzy but had no presyncope. Later she developed a lump in her throat and upper chest discomfort which she does not describe very well. She was short of breath but had no swelling of the lower extremities, cough or fever. She cannot tell me how long the pain lasted but it was relieved with nitroglycerin given to her by paramedics. Blood pressure on admission was 171/88 with a heart rate of 78 and she was afebrile and there was no hypoxia. Labs showed second troponin elevated. Chest x-ray showed no acute process. EKG sinus rhythm with no acute ST-T wave changes. Currently denies any chest pain or SOB. No orthopnea. No N/V/D.         Review of systems:  Review of 10 systems negative except as mentioned in the HPI     Medical History: Reviewed     Surgical history: Reviewed     FamilyHistory: Reviewed     Allergies:  Reviewed     Social Hx: Reviewed.     Scheduled Meds:  Scheduled Medications    amLODIPine  10 mg Oral Daily    carvedilol  12.5 mg Oral BID WC    sodium chloride flush  5-40 mL Intravenous 2 times per day    atorvastatin  40 mg Oral Nightly    metoprolol succinate  25 mg Oral Daily         Continuous Infusions:  Infusions Meds    sodium chloride      heparin (PORCINE) Infusion 10.3 Units/kg/hr (05/06/21 1132)         PRN Meds:. PRN Medications   sodium chloride flush, sodium chloride, promethazine **OR** ondansetron, polyethylene glycol, nitroGLYCERIN, regadenoson, heparin (porcine), heparin (porcine)           Labs/imaging studies: Reviewed.     Above BERNA exam, assessment reviewed and reflect my work. I personally saw, examined, and evaluated the patient today.     I personally reviewed the medications, rhythm strips, pertinent labs and test reports.     [facility] ordering pertinent tests and medication adjustment.     Physical exam:          Vitals:     05/06/21 0633   BP: (!) 180/88   Pulse: 67   Resp: 16   Temp: 98 °F (36.7 °C)   SpO2: 98%         In general, this is a well developed, well nourished who appears stated age. awake, alert, cooperative, no apparent distress     HEENT: eyes -conjunctivae pink,   Neck-  no stridor, no carotid bruit. no jugular venous distention   RESPIRATORY: Chest symmetrical and non-tender to palpation. No accessory muscles use. Lung auscultation -  few rhonchi  CARDIOVASCULAR:     Heart Inspection shows no noted pulsations  Heart Palpation - no palpable thrills  Heart Ausculation - Regular rate and rhythm, 1/6 systolic murmur, No s3 or rub. No lower extremity edema, no varicosities. Distal pulses palpable, no clubbing or cyanosis   ABDOMEN: Soft, nontender,  Bowel sounds present. MS: n/a. : Deferred  Rectal Exam: Deferred  SKIN: warm and dry  NEURO / PSYCH: oriented to person, place           Impression/Recommendations:     Chest pain - Likely NSTEMI - Give her Plavix 75mg, BB, Lipitor, Heparin - Denies any CP now. Cycle Troponin, Check Echo for LV EF and  WMA - Recommend Cardiac cath tomorrow or Lexiscan Saturday. Risk benefits of cath and alternatives discussed. She is agreeable for cath.      Essential HTN - Monitor BP     Hx of Pericardial effusion - No signs of tamponade.  2D Echo oedered     Dyslipidemia - On Statin     Aspirin allergy     Non morbid obesity                  Above recommendations discussed with her        Thank you for the consult.           Johan Kidd MD  5/6/2021  [facility]) Cardiology

## 2021-05-06 NOTE — H&P
Hospital Medicine History & Physical      PCP: Nette Grant DO    Date of Admission: 5/5/2021    Date of Service: Pt seen/examined on 5/5/2021 and Placed in Observation. Chief Complaint: Chest pain      History Of Present Illness:      66 y.o. female who presented to [facility] with medical history of hypertension, valvular heart disease, mild MR, multiple medication allergies including to aspirin and statin, hypothyroidism, hyperlipidemia, obesity, pericardial effusion and chronic edema. Patient started having chest pain around 8:30 PM, pain was on the left side of the chest, described as heaviness, mild to moderate in intensity, radiated into the left arm with severe pain, associated with flushing, shortness of breath, diaphoresis, weakness of the left arm. She denies any nausea or vomiting. She also had sharp pain in the left upper abdomen/epigastric area. No cough or fever. Pain was relieved by nitroglycerin. She has leg swelling which he attributes to psoriatic arthritis. Vital signs notable for blood pressure 171/88, labs showed CO2 of 21, INR 0.9, glucose 168, proBNP 466, negative troponin and normal CBC. Echo in 2017 showed EF of 65% with grade 1 diastolic dysfunction, mild pulmonary hypertension, small pericardial effusion. Chest x-ray today shows mild cardiomegaly otherwise no acute findings. EKG shows normal sinus rhythm rate of 74 with nonspecific ST-T wave changes. Last ischemic work-up was years ago, she does not remember exactly when. Stress test was ordered in 2017 but there is no results to review. She is being admitted for further management.     Past Medical History:          Diagnosis Date    Hyperlipidemia     Hypertension     Thyroid disease        Past Surgical History:          Procedure Laterality Date    CARPAL TUNNEL RELEASE      CATARACT REMOVAL      CHOLECYSTECTOMY      FRACTURE SURGERY      HYSTERECTOMY      SKIN CANCER EXCISION      rt arm rubs or gallops. Abdomen: Soft, mildly tender, non-distended with normal bowel sounds. Musculoskeletal:  No clubbing/cyanosis, 1+ nonpitting edema bilaterally. Limited range of motion without deformity. Skin: Skin color, texture, turgor normal.  No rashes or lesions. Neurologic:  Neurovascularly intact without any focal sensory/motor deficits. Cranial nerves: II-XII intact, grossly non-focal.  Psychiatric:  Alert and oriented, thought content appropriate, normal insight  Capillary Refill: Brisk,< 3 seconds   Peripheral Pulses: +2 palpable, equal bilaterally       Labs:     Recent Labs     05/05/21 2211   WBC 7.5   HGB 12.3   HCT 38.3        Recent Labs     05/05/21 2211      K 5.0      CO2 21*   BUN 18   CREATININE 1.0   CALCIUM 8.9     Recent Labs     05/05/21 2211   AST 22   ALT 12   BILITOT <0.2   ALKPHOS 81     Recent Labs     05/05/21 2211   INR 0.9     Recent Labs     05/05/21 2211   TROPONINI <0.01       Urinalysis:      Lab Results   Component Value Date    NITRU Negative 01/17/2018    [address] NONE 01/17/2018    BACTERIA FEW 01/17/2018    RBCUA 5-10 01/17/2018    BLOODU LARGE 01/17/2018    SPECGRAV 1.010 01/17/2018    GLUCOSEU Negative 01/17/2018       Radiology:   Reviewed and documented  XR CHEST PORTABLE   Final Result   No acute pulmonary process. Mild cardiomegaly. ASSESSMENT:    Active Hospital Problems    Diagnosis Date Noted    Metabolic acidosis [S93.0] 05/06/2021    Allergy to multiple drugs [Z88.9] 05/06/2021    Hypothyroid [E03.9] 05/06/2021    Grade I diastolic dysfunction [P65.6] 05/06/2021    HLD (hyperlipidemia) [E78.5] 05/06/2021    Chest pain [R07.9] 05/05/2021    Edema of both feet [R60.0] 09/05/2018    Class 1 obesity in adult [E66.9] 11/02/2017    Non-rheumatic mitral regurgitation [I34.0] 11/02/2017    Essential hypertension [I10] 01/20/2017    CKD (chronic kidney disease) [N18.9] 01/20/2017         PLAN:  1.   Chest pain rule out acute coronary syndrome, patient story sounds typical.  She does have significant family history. No recent ischemic work-up. Due for cardiology outpatient visit but yet to schedule. Will consult her cardiologist and plan for stress test.  Cycle enzymes. Check D-dimer to evaluate for PE. Patient is intolerant of aspirin and statin. 2.  Hyperglycemia, check L1G.  3.  Metabolic acidosis, recheck labs. 4.  Hypertension, blood pressure is elevated, resume home medications. 5.  Extremity edema, may be secondary to amlodipine the patient is on high dose. This will need to be reduced. 6.  Hypothyroidism, there is no Synthroid on current medication list, check thyroid function. 7.  Hyperlipidemia, statin intolerant, check lipid panel. 8.  Chronic grade 1 diastolic dysfunction  9. Nonrheumatic mitral valve regurgitation  10. Stage II chronic kidney disease at baseline  11. Obesity, dietary and lifestyle modification. DVT Prophylaxis: Lovenox  Diet: No diet orders on file n.p.o. after midnight  Code Status: No Order full code    PT/OT Eval Status: As needed    Dispo -observation/telemetry       Meena Corona MD    Thank you Lucio Hudson DO for the opportunity to be involved in this patient's care.

## 2021-05-06 NOTE — PROGRESS NOTES
Hospitalist Progress Note      Synopsis: Patient admitted on 5/5/2021 for chest pain concerning for NSTEMI. Cardiology consulted and considering cardiac cath. Subjective    Pt reports that chest pain resolved with the medications she was given but could not remember which ones    Exam:  /86   Pulse 61   Temp 97.2 °F (36.2 °C)   Resp 20   Ht 5' 4\" (1.626 m)   Wt 212 lb (96.2 kg)   SpO2 95%   BMI 36.39 kg/m²     General appearance: Elderly female, obese, no apparent distress, appears stated age and cooperative. Afebrile. HEENT:  Normal cephalic, atraumatic without obvious deformity. Pupils equal, round, and reactive to light. Extra ocular muscles intact. Conjunctivae/corneas clear. Neck: Supple, with full range of motion. No jugular venous distention. Trachea midline. Respiratory:  Normal respiratory effort. Clear to auscultation, bilaterally without Rales/Wheezes/Rhonchi. Cardiovascular:  Regular rate and rhythm with normal S1/S2 without murmurs, rubs or gallops. Abdomen: Soft, mildly tender, non-distended with normal bowel sounds. Musculoskeletal:  No clubbing/cyanosis, 1+ nonpitting edema bilaterally. Limited range of motion without deformity. Skin: Skin color, texture, turgor normal.  No rashes or lesions. Neurologic:  Neurovascularly intact without any focal sensory/motor deficits.  Grossly non-focal.  Psychiatric:  Alert and oriented, thought content appropriate, normal insight  Capillary Refill: Brisk,< 3 seconds   Peripheral Pulses: +2 palpable, equal bilaterally        Medications:  Reviewed    Infusion Medications    sodium chloride      heparin (PORCINE) Infusion 10.3 Units/kg/hr (05/06/21 1132)     Scheduled Medications    amLODIPine  10 mg Oral Daily    carvedilol  12.5 mg Oral BID WC    sodium chloride flush  5-40 mL Intravenous 2 times per day    atorvastatin  40 mg Oral Nightly    metoprolol succinate  25 mg Oral Daily     PRN Meds: sodium chloride flush, sodium chloride, promethazine **OR** ondansetron, polyethylene glycol, nitroGLYCERIN, regadenoson, heparin (porcine), heparin (porcine)    I/O    Intake/Output Summary (Last 24 hours) at 5/6/2021 1606  Last data filed at 5/6/2021 1131  Gross per 24 hour   Intake 10 ml   Output --   Net 10 ml       Labs:   Recent Labs     05/05/21 2211 05/06/21  1217   WBC 7.5 7.3   HGB 12.3 13.3   HCT 38.3 41.0    196       Recent Labs     05/05/21 2211 05/06/21  0636    141   K 5.0 4.0    105   CO2 21* 25   BUN 18 16   CREATININE 1.0 1.0   CALCIUM 8.9 9.1       Recent Labs     05/05/21 2211   PROT 7.1   ALKPHOS 81   ALT 12   AST 22   BILITOT <0.2       Recent Labs     05/05/21 2211   INR 0.9       Recent Labs     05/05/21 2211 05/06/21  0122 05/06/21  0636 05/06/21  1217   CKTOTAL  --  54  --   --    TROPONINI <0.01  --  0.09* 0.07*       Chronic labs:  Lab Results   Component Value Date    CHOL 215 (H) 05/06/2021    TRIG 319 (H) 05/06/2021    HDL 36 05/06/2021    LDLCALC 115 (H) 05/06/2021    TSH 2.630 05/06/2021    INR 0.9 05/05/2021    LABA1C 6.2 (H) 05/06/2021       Radiology:  Imaging studies reviewed today. ASSESSMENT:  NSTEMI  Active Problems:    CKD (chronic kidney disease)    Essential hypertension    Non-rheumatic mitral regurgitation    Class 1 obesity in adult    Edema of both feet    Chest pain    Metabolic acidosis    Allergy to multiple drugs    Hypothyroid    Grade I diastolic dysfunction    HLD (hyperlipidemia)  Resolved Problems:    * No resolved hospital problems.  *       PLAN:  Cardiology consulted and has discussed cath with pt  Continue plavix, BB, lipitor, heparin  Trend troponin  ECHO pending  Continue home meds as ordered      Diet: Diet NPO Effective Now Exceptions are: Sips of Water with Meds  Code Status: Full Code  PT/OT Eval Status:   As needed  DVT Prophylaxis:   heparin  Recommended disposition at discharge:  home at [place]     +++++++++++++++++++++++++++++++++++++++++++++++++  Rachel Ivelisse Arias, [address] [place] [address] [address].  +++++++++++++++++++++++++++++++++++++++++++++++++  NOTE: This report was transcribed using voice recognition software. Every effort was made to ensure accuracy; however, inadvertent computerized transcription errors may be present.

## 2021-05-06 NOTE — ED NOTES
Assumed care of pt at this time. Pt noted to be resting with eyes closed. Arouses easily to voice. Cardiac monitoring in place. Per previous nurse, pt to have nuclear stress in AM.  Call light within reach.   Will continue to monitor     Kady Mason RN  05/06/21 3034

## 2021-05-06 NOTE — ED PROVIDER NOTES
HPI:  5/5/21, Time: 9:54 PM EDT         Justo Silva is a 66 y.o. female presenting to the ED for chest pain and shortness of breath and dizziness, beginning 1.5 hours ago. The complaint has been persistent, moderate in severity, and worsened by nothing. Patient reporting heaviness in her chest going to her arm that started a short time prior to arrival.  Patient was given 1 nitro by us with improvement. Patient reporting feeling short of breath she reports no abdominal pain she does report nauseated feeling. She reports a mild frontal headache. She reports no upper or lower extremity weakness. She reports no fever chills or cough patient reporting no calf pain. Patient does report feeling better after receiving nitro. Patient is a lotion nitro she states that she does have swelling in her throat and her lips when she does take aspirin. ROS:   Pertinent positives and negatives are stated within HPI, all other systems reviewed and are negative.  --------------------------------------------- PAST HISTORY ---------------------------------------------  Past Medical History:  has a past medical history of Hyperlipidemia, Hypertension, and Thyroid disease. Past Surgical History:  has a past surgical history that includes Carpal tunnel release; Cholecystectomy; fracture surgery; Hysterectomy; Cataract removal; and Skin cancer excision. Social History:  reports that she has never smoked. She has never used smokeless tobacco. She reports that she does not drink alcohol or use drugs. Family History: family history is not on file. The patients home medications have been reviewed.     Allergies: Latex, Aspirin, Andrés-allergin [diphenhydramine], Darvocet a500 [propoxyphene n-acetaminophen], Fish-derived products, Honey bee venom [bee venom], Lemon oil, Niacin and related, Nuts [peanut oil], Penicillins, Shellfish-derived products, and Wine [alcohol]    ---------------------------------------------------PHYSICAL EXAM--------------------------------------    Constitutional/General: Alert and oriented x3, well appearing, non toxic in NAD  Head: Normocephalic and atraumatic  Eyes: PERRL, EOMI  Mouth: Oropharynx clear, handling secretions, no trismus  Neck: Supple, full ROM, non tender to palpation in the midline, no stridor, no crepitus, no meningeal signs  Pulmonary: Lungs clear to auscultation bilaterally, no wheezes, rales, or rhonchi. Not in respiratory distress  Cardiovascular:  Regular rate. Regular rhythm. No murmurs, gallops, or rubs. 2+ distal pulses  Chest: no chest wall tenderness  Abdomen: Soft. Non tender. Non distended. +BS. No rebound, guarding, or rigidity. No pulsatile masses appreciated. Musculoskeletal: Moves all extremities x 4. Warm and well perfused, no clubbing, cyanosis, edema bilaterally  Skin: warm and dry. No rashes. Neurologic: GCS 15, CN 2-12 grossly intact, no focal deficits, symmetric strength 5/5 in the upper and lower extremities bilaterally  Psych: Normal Affect    -------------------------------------------------- RESULTS -------------------------------------------------  I have personally reviewed all laboratory and imaging results for this patient. Results are listed below.      LABS:  Results for orders placed or performed during the hospital encounter of 05/05/21   CBC auto differential   Result Value Ref Range    WBC 7.5 4.5 - 11.5 E9/L    RBC 4.08 3.50 - 5.50 E12/L    Hemoglobin 12.3 11.5 - 15.5 g/dL    Hematocrit 38.3 34.0 - 48.0 %    MCV 93.9 80.0 - 99.9 fL    MCH 30.1 26.0 - 35.0 pg    MCHC 32.1 32.0 - 34.5 %    RDW 13.8 11.5 - 15.0 fL    Platelets 632 487 - 153 E9/L    MPV 9.6 7.0 - 12.0 fL    Neutrophils % 55.9 43.0 - 80.0 %    Immature Granulocytes % 0.4 0.0 - 5.0 %    Lymphocytes % 30.5 20.0 - 42.0 %    Monocytes % 8.8 2.0 - 12.0 %    Eosinophils % 4.0 0.0 - 6.0 %    Basophils % 0.4 0.0 - 2.0 % Neutrophils Absolute 4.18 1.80 - 7.30 E9/L    Immature Granulocytes # 0.03 E9/L    Lymphocytes Absolute 2.28 1.50 - 4.00 E9/L    Monocytes Absolute 0.66 0.10 - 0.95 E9/L    Eosinophils Absolute 0.30 0.05 - 0.50 E9/L    Basophils Absolute 0.03 0.00 - 0.20 E9/L   Comprehensive Metabolic Panel   Result Value Ref Range    Sodium 136 132 - 146 mmol/L    Potassium 5.0 3.5 - 5.0 mmol/L    Chloride 105 98 - 107 mmol/L    CO2 21 (L) 22 - 29 mmol/L    Anion Gap 10 7 - 16 mmol/L    Glucose 168 (H) 74 - 99 mg/dL    BUN 18 6 - 23 mg/dL    CREATININE 1.0 0.5 - 1.0 mg/dL    GFR Non-African American 54 >=60 mL/min/1.73    GFR African American >60     Calcium 8.9 8.6 - 10.2 mg/dL    Total Protein 7.1 6.4 - 8.3 g/dL    Albumin 4.0 3.5 - 5.2 g/dL    Total Bilirubin <0.2 0.0 - 1.2 mg/dL    Alkaline Phosphatase 81 35 - 104 U/L    ALT 12 0 - 32 U/L    AST 22 0 - 31 U/L   Troponin   Result Value Ref Range    Troponin <0.01 0.00 - 0.03 ng/mL   Brain Natriuretic Peptide   Result Value Ref Range    Pro- (H) 0 - 450 pg/mL   Protime-INR   Result Value Ref Range    Protime 10.2 9.3 - 12.4 sec    INR 0.9        RADIOLOGY:  Interpreted by Radiologist.  XR CHEST PORTABLE    (Results Pending)           EKG: This EKG is signed and interpreted by me. Rate: 74  Rhythm: Sinus  Interpretation: non-specific EKG  Comparison: stable as compared to patient's most recent EKG      ------------------------- NURSING NOTES AND VITALS REVIEWED ---------------------------   The nursing notes within the ED encounter and vital signs as below have been reviewed by myself. BP (!) 171/88   Pulse 78   Temp 98 °F (36.7 °C) (Tympanic)   Resp 20   Ht 5' 4\" (1.626 m)   Wt 212 lb (96.2 kg)   SpO2 96%   BMI 36.39 kg/m²   Oxygen Saturation Interpretation: Normal    The patients available past medical records and past encounters were reviewed.         ------------------------------ ED COURSE/MEDICAL DECISION MAKING----------------------  Medications - No data to display          Medical Decision Making:    Presenting here because of pain in her chest that started a short time prior to arrival.  Patient reporting heaviness in chest.  Patient reporting dizzy and lightheadedness. Patient reporting shortness of breath. Patient reporting pain radiating to her left arm. Patient had relief with nitro. Labs noted reviewed EKG sinus nonspecific no ST elevation. Patient plan will be to admit to monitored bed    Re-Evaluations:             Re-evaluation. Patients symptoms show no change  Patient rechecked and made aware of findings and plan    Consultations:          Internal medicine       Critical Care: This patient's ED course included: a personal history and physicial eaxmination    This patient has been closely monitored during their ED course. Counseling: The emergency provider has spoken with the patient and discussed todays results, in addition to providing specific details for the plan of care and counseling regarding the diagnosis and prognosis. Questions are answered at this time and they are agreeable with the plan.       --------------------------------- IMPRESSION AND DISPOSITION ---------------------------------    IMPRESSION  1. Chest pain, unspecified type        DISPOSITION  Disposition: Admit to telemetry  Patient condition is stable        NOTE: This report was transcribed using voice recognition software.  Every effort was made to ensure accuracy; however, inadvertent computerized transcription errors may be present          Pablo Garland MD  05/05/21 65 [facility] Irish Valdez MD  05/05/21 3378

## 2021-05-07 LAB
ABO/RH: NORMAL
ANTIBODY SCREEN: NORMAL
APTT: 56.7 SEC (ref 24.5–35.1)
APTT: 66.1 SEC (ref 24.5–35.1)
EKG ATRIAL RATE: 59 BPM
EKG P AXIS: -17 DEGREES
EKG P-R INTERVAL: 168 MS
EKG Q-T INTERVAL: 530 MS
EKG QRS DURATION: 84 MS
EKG QTC CALCULATION (BAZETT): 524 MS
EKG R AXIS: -18 DEGREES
EKG T AXIS: -57 DEGREES
EKG VENTRICULAR RATE: 59 BPM
POC ACT LR: 163 SECONDS
POC ACT LR: 182 SECONDS
POC ACT LR: 325 SECONDS
POC ACT LR: >400 SECONDS

## 2021-05-07 PROCEDURE — 027034Z DILATION OF CORONARY ARTERY, ONE ARTERY WITH DRUG-ELUTING INTRALUMINAL DEVICE, PERCUTANEOUS APPROACH: ICD-10-PCS | Performed by: INTERNAL MEDICINE

## 2021-05-07 PROCEDURE — 2709999900 HC NON-CHARGEABLE SUPPLY

## 2021-05-07 PROCEDURE — 86900 BLOOD TYPING SEROLOGIC ABO: CPT

## 2021-05-07 PROCEDURE — 99024 POSTOP FOLLOW-UP VISIT: CPT | Performed by: INTERNAL MEDICINE

## 2021-05-07 PROCEDURE — 93458 L HRT ARTERY/VENTRICLE ANGIO: CPT | Performed by: INTERNAL MEDICINE

## 2021-05-07 PROCEDURE — 6360000002 HC RX W HCPCS: Performed by: INTERNAL MEDICINE

## 2021-05-07 PROCEDURE — 6370000000 HC RX 637 (ALT 250 FOR IP): Performed by: FAMILY MEDICINE

## 2021-05-07 PROCEDURE — C1874 STENT, COATED/COV W/DEL SYS: HCPCS

## 2021-05-07 PROCEDURE — C9600 PERC DRUG-EL COR STENT SING: HCPCS | Performed by: INTERNAL MEDICINE

## 2021-05-07 PROCEDURE — 6370000000 HC RX 637 (ALT 250 FOR IP): Performed by: NURSE PRACTITIONER

## 2021-05-07 PROCEDURE — 85730 THROMBOPLASTIN TIME PARTIAL: CPT

## 2021-05-07 PROCEDURE — B2151ZZ FLUOROSCOPY OF LEFT HEART USING LOW OSMOLAR CONTRAST: ICD-10-PCS | Performed by: INTERNAL MEDICINE

## 2021-05-07 PROCEDURE — 36415 COLL VENOUS BLD VENIPUNCTURE: CPT

## 2021-05-07 PROCEDURE — 2500000003 HC RX 250 WO HCPCS: Performed by: INTERNAL MEDICINE

## 2021-05-07 PROCEDURE — 86901 BLOOD TYPING SEROLOGIC RH(D): CPT

## 2021-05-07 PROCEDURE — 86850 RBC ANTIBODY SCREEN: CPT

## 2021-05-07 PROCEDURE — 2060000000 HC ICU INTERMEDIATE R&B

## 2021-05-07 PROCEDURE — C1725 CATH, TRANSLUMIN NON-LASER: HCPCS

## 2021-05-07 PROCEDURE — C1887 CATHETER, GUIDING: HCPCS

## 2021-05-07 PROCEDURE — C1894 INTRO/SHEATH, NON-LASER: HCPCS

## 2021-05-07 PROCEDURE — 85347 COAGULATION TIME ACTIVATED: CPT

## 2021-05-07 PROCEDURE — B2111ZZ FLUOROSCOPY OF MULTIPLE CORONARY ARTERIES USING LOW OSMOLAR CONTRAST: ICD-10-PCS | Performed by: INTERNAL MEDICINE

## 2021-05-07 PROCEDURE — C1769 GUIDE WIRE: HCPCS

## 2021-05-07 PROCEDURE — 2500000003 HC RX 250 WO HCPCS

## 2021-05-07 PROCEDURE — 6360000002 HC RX W HCPCS

## 2021-05-07 PROCEDURE — 93005 ELECTROCARDIOGRAM TRACING: CPT | Performed by: FAMILY MEDICINE

## 2021-05-07 PROCEDURE — 93010 ELECTROCARDIOGRAM REPORT: CPT | Performed by: INTERNAL MEDICINE

## 2021-05-07 PROCEDURE — 6370000000 HC RX 637 (ALT 250 FOR IP)

## 2021-05-07 PROCEDURE — 2580000003 HC RX 258: Performed by: FAMILY MEDICINE

## 2021-05-07 PROCEDURE — 4A023N7 MEASUREMENT OF CARDIAC SAMPLING AND PRESSURE, LEFT HEART, PERCUTANEOUS APPROACH: ICD-10-PCS | Performed by: INTERNAL MEDICINE

## 2021-05-07 PROCEDURE — 92928 PRQ TCAT PLMT NTRAC ST 1 LES: CPT | Performed by: INTERNAL MEDICINE

## 2021-05-07 RX ORDER — NITROGLYCERIN 0.4 MG/1
0.4 TABLET SUBLINGUAL EVERY 5 MIN PRN
Status: DISCONTINUED | OUTPATIENT
Start: 2021-05-07 | End: 2021-05-08 | Stop reason: HOSPADM

## 2021-05-07 RX ORDER — DIPHENHYDRAMINE HYDROCHLORIDE 50 MG/ML
50 INJECTION INTRAMUSCULAR; INTRAVENOUS ONCE
Status: COMPLETED | OUTPATIENT
Start: 2021-05-07 | End: 2021-05-07

## 2021-05-07 RX ORDER — HYDROXYZINE PAMOATE 25 MG/1
25 CAPSULE ORAL EVERY 6 HOURS PRN
Status: DISCONTINUED | OUTPATIENT
Start: 2021-05-07 | End: 2021-05-08 | Stop reason: HOSPADM

## 2021-05-07 RX ORDER — SODIUM CHLORIDE 9 MG/ML
INJECTION, SOLUTION INTRAVENOUS CONTINUOUS
Status: DISCONTINUED | OUTPATIENT
Start: 2021-05-07 | End: 2021-05-08 | Stop reason: HOSPADM

## 2021-05-07 RX ORDER — METHYLPREDNISOLONE SODIUM SUCCINATE 125 MG/2ML
125 INJECTION, POWDER, LYOPHILIZED, FOR SOLUTION INTRAMUSCULAR; INTRAVENOUS ONCE
Status: COMPLETED | OUTPATIENT
Start: 2021-05-07 | End: 2021-05-07

## 2021-05-07 RX ORDER — SODIUM CHLORIDE 0.9 % (FLUSH) 0.9 %
5-40 SYRINGE (ML) INJECTION EVERY 12 HOURS SCHEDULED
Status: DISCONTINUED | OUTPATIENT
Start: 2021-05-07 | End: 2021-05-08 | Stop reason: HOSPADM

## 2021-05-07 RX ORDER — SODIUM CHLORIDE 9 MG/ML
25 INJECTION, SOLUTION INTRAVENOUS PRN
Status: DISCONTINUED | OUTPATIENT
Start: 2021-05-07 | End: 2021-05-08 | Stop reason: HOSPADM

## 2021-05-07 RX ORDER — METOPROLOL SUCCINATE 50 MG/1
50 TABLET, EXTENDED RELEASE ORAL DAILY
Status: DISCONTINUED | OUTPATIENT
Start: 2021-05-08 | End: 2021-05-08 | Stop reason: HOSPADM

## 2021-05-07 RX ORDER — SODIUM CHLORIDE 0.9 % (FLUSH) 0.9 %
5-40 SYRINGE (ML) INJECTION PRN
Status: DISCONTINUED | OUTPATIENT
Start: 2021-05-07 | End: 2021-05-08 | Stop reason: HOSPADM

## 2021-05-07 RX ORDER — ISOSORBIDE MONONITRATE 30 MG/1
30 TABLET, EXTENDED RELEASE ORAL DAILY
Status: DISCONTINUED | OUTPATIENT
Start: 2021-05-08 | End: 2021-05-08 | Stop reason: HOSPADM

## 2021-05-07 RX ADMIN — ATORVASTATIN CALCIUM 40 MG: 40 TABLET, FILM COATED ORAL at 21:16

## 2021-05-07 RX ADMIN — METOPROLOL SUCCINATE 25 MG: 25 TABLET, FILM COATED, EXTENDED RELEASE ORAL at 09:53

## 2021-05-07 RX ADMIN — DIPHENHYDRAMINE HYDROCHLORIDE 50 MG: 50 INJECTION, SOLUTION INTRAMUSCULAR; INTRAVENOUS at 10:16

## 2021-05-07 RX ADMIN — AMLODIPINE BESYLATE 10 MG: 5 TABLET ORAL at 09:54

## 2021-05-07 RX ADMIN — Medication 40 ML: at 21:17

## 2021-05-07 RX ADMIN — FAMOTIDINE 20 MG: 10 INJECTION, SOLUTION INTRAVENOUS at 10:15

## 2021-05-07 RX ADMIN — CARVEDILOL 12.5 MG: 6.25 TABLET, FILM COATED ORAL at 09:53

## 2021-05-07 RX ADMIN — METHYLPREDNISOLONE SODIUM SUCCINATE 125 MG: 125 INJECTION, POWDER, FOR SOLUTION INTRAMUSCULAR; INTRAVENOUS at 10:15

## 2021-05-07 NOTE — CARE COORDINATION
Met with pt at bedside to discuss discharge / transition of care plan. Pt reports from home alone; independent of all ADL; active ; denies DME or needs; verified PCP and pharmacy; discharge plan is home with no needs, daughter Milton Griggs to transport once medically stable.

## 2021-05-07 NOTE — PROGRESS NOTES
 atorvastatin  40 mg Oral Nightly    metoprolol succinate  25 mg Oral Daily       IV Infusions (if any):   sodium chloride      heparin (PORCINE) Infusion 12.3 Units/kg/hr (21 3051)         PHYSICAL EXAM:     CONSTITUTIONAL:   BP (!) 119/91   Pulse 64   Temp 98 °F (36.7 °C) (Temporal)   Resp 18   Ht 5' 4\" (1.626 m)   Wt 218 lb (98.9 kg)   SpO2 97%   BMI 37.42 kg/m²   Pulse  Av.8  Min: 64  Max: 71  Systolic (14MZS), BWC:759 , Min:119 , OFH:024    Diastolic (55FUW), VLP:69, Min:78, Max:91    In general, this is a well developed, well nourished who appears stated age. awake, alert,  no apparent distress  HEENT: eyes -conjunctivae pink,  Throat - Oral mucosa pink and moist.   Neck-  no stridor, no noted enlargement of the thyroid, no carotid bruit. no jugular venous distention   RESPIRATORY: Chest symmetrical and non-tender to palpation. No accessory muscle use. Lung auscultation - few rhonchi  CARDIOVASCULAR:       Heart Palpation - no palpable thrills   Heart Ausculation - Regular rate and rhythm, 1/6 systolic murmur, No s3 or rub. No lower extremity edema, no varicosities. Distal pulses palpable, no clubbing or cyanosis   ABDOMEN: Soft, nontender, nondistended. Bowel sounds present. MS: good muscle strength and tone. : Deferred  Rectal Exam: Deferred  SKIN: warm and dry no statis dermatitis or ulcers   NEURO / PSYCH: oriented to person, place        ASSESSMENT:      NSTEMI (non-ST elevated myocardial infarction) ([facility] [address] - New Anterior EKG changes; Continue Plavix, BB, Statin, Heparin. Risk benefits of Left heart cath dicussed, agreeable. For Cath/PTCA-Stent of CABG if needed        Essential hypertension - Controlled      Non-rheumatic mitral regurgitation      Aspirin allergy - \"Lip swelling\"      HLD (hyperlipidemia) - On Statin          Discontinue Coreg  Above recommendations discussed with her.       Case D/w Dr Gustabo Voss    Electronically signed by Karsten Wu MD on 2021 at 10:22 AM  [facility] [place] ([facility]) Cardiology

## 2021-05-07 NOTE — PROGRESS NOTES
Hospitalist Progress Note      Synopsis: Patient admitted on 5/5/2021 for chest pain concerning for NSTEMI. Cardiology consulted and considering cardiac cath. Subjective    No additional chest pain  No shortness of breath  Exam:  BP (!) 119/91   Pulse 64   Temp 98 °F (36.7 °C) (Temporal)   Resp 18   Ht 5' 4\" (1.626 m)   Wt 218 lb (98.9 kg)   SpO2 97%   BMI 37.42 kg/m²     General appearance: Elderly female, obese, no apparent distress, appears stated age and cooperative. Afebrile. HEENT:  Normal cephalic, atraumatic without obvious deformity. Pupils equal, round, and reactive to light. Extra ocular muscles intact. Conjunctivae/corneas clear. Neck: Supple, with full range of motion. No jugular venous distention. Trachea midline. Respiratory:  Normal respiratory effort. Clear to auscultation, bilaterally without Rales/Wheezes/Rhonchi. Cardiovascular:  Regular rate and rhythm with normal S1/S2 without murmurs, rubs or gallops. Abdomen: Soft, mildly tender, non-distended with normal bowel sounds. Musculoskeletal:  No clubbing/cyanosis, 1+ nonpitting edema bilaterally. Limited range of motion without deformity. Skin: Skin color, texture, turgor normal.  No rashes or lesions. Neurologic:  Neurovascularly intact without any focal sensory/motor deficits.  Grossly non-focal.  Psychiatric:  Alert and oriented, thought content appropriate, normal insight  Capillary Refill: Brisk,< 3 seconds   Peripheral Pulses: +2 palpable, equal bilaterally        Medications:  Reviewed    Infusion Medications    sodium chloride      heparin (PORCINE) Infusion Stopped (05/07/21 1024)     Scheduled Medications    amLODIPine  10 mg Oral Daily    sodium chloride flush  5-40 mL Intravenous 2 times per day    atorvastatin  40 mg Oral Nightly    metoprolol succinate  25 mg Oral Daily     PRN Meds: hydrOXYzine, sodium chloride flush, sodium chloride, promethazine **OR** ondansetron, polyethylene glycol, nitroGLYCERIN, regadenoson, heparin (porcine), heparin (porcine)    I/O    Intake/Output Summary (Last 24 hours) at 5/7/2021 1606  Last data filed at 5/7/2021 1230  Gross per 24 hour   Intake 240 ml   Output --   Net 240 ml       Labs:   Recent Labs     05/05/21 2211 05/06/21  1217   WBC 7.5 7.3   HGB 12.3 13.3   HCT 38.3 41.0    196       Recent Labs     05/05/21 2211 05/06/21  0636    141   K 5.0 4.0    105   CO2 21* 25   BUN 18 16   CREATININE 1.0 1.0   CALCIUM 8.9 9.1       Recent Labs     05/05/21 2211   PROT 7.1   ALKPHOS 81   ALT 12   AST 22   BILITOT <0.2       Recent Labs     05/05/21 2211   INR 0.9       Recent Labs     05/06/21  0122 05/06/21  0636 05/06/21  1217 05/06/21  1703   CKTOTAL 54  --   --   --    TROPONINI  --  0.09* 0.07* 0.06*       Chronic labs:  Lab Results   Component Value Date    CHOL 215 (H) 05/06/2021    TRIG 319 (H) 05/06/2021    HDL 36 05/06/2021    LDLCALC 115 (H) 05/06/2021    TSH 2.630 05/06/2021    INR 0.9 05/05/2021    LABA1C 6.2 (H) 05/06/2021       Radiology:  Imaging studies reviewed today. ASSESSMENT:  NSTEMI  Active Problems:    CKD (chronic kidney disease)    Essential hypertension    Non-rheumatic mitral regurgitation    Class 1 obesity in adult    Edema of both feet    Chest pain    Metabolic acidosis    Allergy to multiple drugs    Hypothyroid    Grade I diastolic dysfunction    HLD (hyperlipidemia)    NSTEMI (non-ST elevated myocardial infarction) (HCC)    ACS (acute coronary syndrome) ([facility])    Shortness of breath  Resolved Problems:    * No resolved hospital problems.  *       PLAN:  Cardiology recommend heart cath  Heart cath findings noted   S/p drug-eluting stent to proximal LAD      Diet: DIET CARDIAC;  Code Status: Full Code  PT/OT Eval Status:   As needed  DVT Prophylaxis:   heparin  Recommended disposition at discharge:  home at [place]     +++++++++++++++++++++++++++++++++++++++++++++++++  Spencer Spaulding MD   [facility].  +++++++++++++++++++++++++++++++++++++++++++++++++  NOTE: This report was transcribed using voice recognition software. Every effort was made to ensure accuracy; however, inadvertent computerized transcription errors may be present.

## 2021-05-07 NOTE — PROCEDURES
artery  branch. d.  RCA. Small and nondominant vessel with 99% subtotal mid vessel  occlusion with KIRK-1 flow in the vessel. 2.  Normal left ventricular size with apical hypokinesis with an  estimated ejection fraction of 45%-50%. 3.  Successful balloon angioplasty with deployment of drug-eluting  coronary stent to the proximal LAD with very good results.         Gumaro Sam MD    D: 05/07/2021 12:25:22       T: 05/07/2021 12:38:52     [email or web address]  Job#: [id number]     Doc#: [id number]    CC:

## 2021-05-07 NOTE — OP NOTE
Operative Note      Patient: Dariel Camejo  YOB: 1945  MRN: [id number]    Date of Procedure: 5/7/21      Indication:    1. NSTEMI  2. Catheterization: AUC score 7 . Indication 3.  3. PCI: AUC score 7 Indication 16. Procedure: Left Heart Catheterization, coronary angiography, left ventriculography, percutaneous coronary intervention to LAD      Anesthesia: Versed, Fentanyl  Time sedation was administered: 10:54. I was present in the room when sedation was administered. Procedure end time: 11:51  Time spent with face to face monitoring of moderate sedation: 64 minutes    Cardiac cath performed via right radial approach using 6F sheath. Findings:   Left main: 0% stenosis  LAD: 90 %   stenosis  Circumflex: 70%   stenosis. RCA: S mall, non dominant. 99 %  stenosis. LV angio: 45 - 50% with apical hypokinesis      Hemodynamics: Ao: 69/47 ( 59 )  LV: 102  LVEDP: 4    Interventional procedure:   1. PCI vessel: LAD. Lesion type: C. KIRK II flow pre PCI. Angioplasty performed with 2.5 mm balloon. Stenting performed with 2.75 mm HO. Result: 0% residual stenosis and KIRK III distal flow. Drugs: . Anticoagulation was maintained with IV Heparin. Brilinta 180 mg load given  in cath lab. Right radial sheath removed and TR band applied. There was good distal pulses in the RUE at the end of the procedure. Complication: None   Blood loss: 20 cc  Contrast used: 120 cc      Post op diagnosis:  CAD  Successful PCI to LAD ( culprit vessel )    Plan:  DAPT  Risk profile modification.    See orders        Electronically signed by Hanh Hill MD on 5/7/2021 at 12:10 PM

## 2021-05-08 VITALS
RESPIRATION RATE: 16 BRPM | BODY MASS INDEX: 37.22 KG/M2 | HEIGHT: 64 IN | DIASTOLIC BLOOD PRESSURE: 87 MMHG | TEMPERATURE: 98.1 F | WEIGHT: 218 LBS | HEART RATE: 85 BPM | OXYGEN SATURATION: 95 % | SYSTOLIC BLOOD PRESSURE: 148 MMHG

## 2021-05-08 LAB
EKG ATRIAL RATE: 84 BPM
EKG P AXIS: 54 DEGREES
EKG P-R INTERVAL: 176 MS
EKG Q-T INTERVAL: 446 MS
EKG QRS DURATION: 90 MS
EKG QTC CALCULATION (BAZETT): 527 MS
EKG R AXIS: 35 DEGREES
EKG T AXIS: -150 DEGREES
EKG VENTRICULAR RATE: 84 BPM
HCT VFR BLD CALC: 37.6 % (ref 34–48)
HEMOGLOBIN: 12.5 G/DL (ref 11.5–15.5)
MCH RBC QN AUTO: 30.3 PG (ref 26–35)
MCHC RBC AUTO-ENTMCNC: 33.2 % (ref 32–34.5)
MCV RBC AUTO: 91.3 FL (ref 80–99.9)
PDW BLD-RTO: 13.9 FL (ref 11.5–15)
PLATELET # BLD: 193 E9/L (ref 130–450)
PMV BLD AUTO: 9.9 FL (ref 7–12)
RBC # BLD: 4.12 E12/L (ref 3.5–5.5)
WBC # BLD: 11.8 E9/L (ref 4.5–11.5)

## 2021-05-08 PROCEDURE — 93005 ELECTROCARDIOGRAM TRACING: CPT | Performed by: INTERNAL MEDICINE

## 2021-05-08 PROCEDURE — 99232 SBSQ HOSP IP/OBS MODERATE 35: CPT | Performed by: INTERNAL MEDICINE

## 2021-05-08 PROCEDURE — 36415 COLL VENOUS BLD VENIPUNCTURE: CPT

## 2021-05-08 PROCEDURE — 93010 ELECTROCARDIOGRAM REPORT: CPT | Performed by: INTERNAL MEDICINE

## 2021-05-08 PROCEDURE — 85027 COMPLETE CBC AUTOMATED: CPT

## 2021-05-08 PROCEDURE — 2580000003 HC RX 258: Performed by: INTERNAL MEDICINE

## 2021-05-08 PROCEDURE — 6370000000 HC RX 637 (ALT 250 FOR IP): Performed by: INTERNAL MEDICINE

## 2021-05-08 RX ORDER — ATORVASTATIN CALCIUM 40 MG/1
40 TABLET, FILM COATED ORAL NIGHTLY
Qty: 30 TABLET | Refills: 3 | Status: SHIPPED | OUTPATIENT
Start: 2021-05-08

## 2021-05-08 RX ORDER — ISOSORBIDE MONONITRATE 30 MG/1
30 TABLET, EXTENDED RELEASE ORAL DAILY
Qty: 30 TABLET | Refills: 3 | Status: SHIPPED | OUTPATIENT
Start: 2021-05-09

## 2021-05-08 RX ORDER — METOPROLOL SUCCINATE 50 MG/1
50 TABLET, EXTENDED RELEASE ORAL DAILY
Qty: 30 TABLET | Refills: 3 | Status: SHIPPED | OUTPATIENT
Start: 2021-05-09

## 2021-05-08 RX ADMIN — SODIUM CHLORIDE: 9 INJECTION, SOLUTION INTRAVENOUS at 00:08

## 2021-05-08 RX ADMIN — Medication 40 ML: at 00:09

## 2021-05-08 RX ADMIN — Medication 10 ML: at 08:47

## 2021-05-08 RX ADMIN — ISOSORBIDE MONONITRATE 30 MG: 30 TABLET ORAL at 08:47

## 2021-05-08 RX ADMIN — TICAGRELOR 90 MG: 90 TABLET ORAL at 08:47

## 2021-05-08 RX ADMIN — METOPROLOL SUCCINATE 50 MG: 50 TABLET, EXTENDED RELEASE ORAL at 08:47

## 2021-05-08 RX ADMIN — AMLODIPINE BESYLATE 10 MG: 5 TABLET ORAL at 08:47

## 2021-05-08 NOTE — DISCHARGE SUMMARY
Hospitalist Discharge Summary    Patient ID: Janie Diaz   Patient : 1945  Patient's PCP: Dave Gutierres DO    Admit Date: 2021   Admitting Physician: Ion Verma MD    Discharge Date:  2021   Discharge Physician: Stephanie Eli MD   Discharge Condition: Stable  Discharge Disposition: [facility] course in brief:    64-year-old female with past medical history significant for hypertension admitted to our hospital on  with chief complaint of chest pain concerning for non-ST segment elevation MI. Patient underwent cardiac cath on  status post drug-eluting stents to proximal LAD. Patient is allergic to aspirin so she will be discharged on single platelet therapy with Brilinta. Rest of the medications are pretty much protocol guided. Patient denies any chest pain, shortness of breath, catheter entry site issues. Medically stable for discharge home. Consults:   IP CONSULT TO INTERNAL MEDICINE  IP CONSULT TO CARDIOLOGY  IP CONSULT TO CARDIAC REHAB    Discharge Diagnoses:    NSTEMI  Active Problems:    CKD (chronic kidney disease)    Essential hypertension    Non-rheumatic mitral regurgitation    Class 1 obesity in adult    Edema of both feet    Chest pain    Metabolic acidosis    Allergy to multiple drugs    Hypothyroid    Grade I diastolic dysfunction    HLD (hyperlipidemia)    NSTEMI (non-ST elevated myocardial infarction) (HCC)    ACS (acute coronary syndrome) (HCC)    Shortness of breath  Resolved Problems:    * No resolved hospital problems. *        PLAN:  Cardiology recommend heart cath  Heart cath findings noted   S/p drug-eluting stent to proximal LAD    Discharge Instructions / Follow up:    No future appointments.     Continued appropriate risk factor modification of blood pressure, diabetes and serum lipids will remain essential to reducing risk of future atherosclerotic development    Activity: activity as tolerated    Significant labs:  CBC:   Recent Labs 05/05/21 2211 05/06/21  1217 05/08/21  0609   WBC 7.5 7.3 11.8*   RBC 4.08 4.43 4.12   HGB 12.3 13.3 12.5   HCT 38.3 41.0 37.6   MCV 93.9 92.6 91.3   RDW 13.8 13.6 13.9    196 193     BMP:   Recent Labs     05/05/21 2211 05/06/21  0636    141   K 5.0 4.0    105   CO2 21* 25   BUN 18 16   CREATININE 1.0 1.0     LFT:  Recent Labs     05/05/21 2211   PROT 7.1   ALKPHOS 81   ALT 12   AST 22   BILITOT <0.2     PT/INR:   Recent Labs     05/05/21 2211 05/05/21 2211 05/06/21  1703 05/06/21  2345 05/07/21  0656   INR 0.9  --   --   --   --    APTT  --    < > 46.8* 66.1* 56.7*    < > = values in this interval not displayed. BNP: No results for input(s): BNP in the last 72 hours. Hgb A1C:   Lab Results   Component Value Date    LABA1C 6.2 (H) 05/06/2021     Folate and B12: No results found for: KOEPTXRF00, No results found for: FOLATE  Thyroid Studies:   Lab Results   Component Value Date    TSH 2.630 05/06/2021       Urinalysis:    Lab Results   Component Value Date    NITRU Negative 01/17/2018    WBCUA NONE 01/17/2018    BACTERIA FEW 01/17/2018    RBCUA 5-10 01/17/2018    BLOODU LARGE 01/17/2018    SPECGRAV 1.010 01/17/2018    GLUCOSEU Negative 01/17/2018       Imaging:  Xr Chest Portable    Result Date: 5/5/2021  EXAMINATION: ONE XRAY VIEW OF THE CHEST 5/5/2021 10:44 pm COMPARISON: January 17, 2018 HISTORY: ORDERING SYSTEM PROVIDED HISTORY: sob TECHNOLOGIST PROVIDED HISTORY: Reason for exam:->sob What reading provider will be dictating this exam?->CRC FINDINGS: The lungs are clear. The heart is enlarged. There is stable widening of the right superior mediastinum, likely from underlying ectatic vasculature. No pneumothorax. The costophrenic angles are clear. No acute pulmonary process. Mild cardiomegaly.        Discharge Medications:      Medication List      START taking these medications    atorvastatin 40 MG tablet  Commonly known as: LIPITOR  Take 1 tablet by mouth nightly isosorbide mononitrate 30 MG extended release tablet  Commonly known as: IMDUR  Take 1 tablet by mouth daily  Start taking on: May 9, 2021     metoprolol succinate 50 MG extended release tablet  Commonly known as: TOPROL XL  Take 1 tablet by mouth daily  Start taking on: May 9, 2021     ticagrelor 90 MG Tabs tablet  Commonly known as: BRILINTA  Take 1 tablet by mouth 2 times daily        CONTINUE taking these medications    amLODIPine 10 MG tablet  Commonly known as: NORVASC     carvedilol 12.5 MG tablet  Commonly known as: COREG     fluticasone 50 MCG/ACT nasal spray  Commonly known as: FLONASE     vitamin D 25 MCG (1000 UT) Tabs tablet  Commonly known as: CHOLECALCIFEROL           Where to Get Your Medications      These medications were sent to [place] [address] [address] [place] [address] [phone] 2864 [address] [facility] [address], [place] [address]    Phone: [phone]   · atorvastatin 40 MG tablet  · isosorbide mononitrate 30 MG extended release tablet  · metoprolol succinate 50 MG extended release tablet  · ticagrelor 90 MG Tabs tablet         Time Spent on discharge is more than 45 minutes in the examination, evaluation, counseling and review of medications and discharge plan.    +++++++++++++++++++++++++++++++++++++++++++++++++  LEIDY Wells/ Daqu[place] [address]  +++++++++++++++++++++++++++++++++++++++++++++++++  NOTE: This report was transcribed using voice recognition software. Every effort was made to ensure accuracy; however, inadvertent computerized transcription errors may be present.

## 2021-05-08 NOTE — CONSULTS
Met with patient and discussed that their physician has ordered a referral to our outpatient Phase II Cardiac Rehabilitation program. Reviewed the benefits of cardiac rehabilitation based on their diagnosis and personal risk factors. Patient demonstrates mild interest in Cardiac Rehabilitation at this time. Cardiac Rehabilitation brochure provided to patient/family. The Cardiac Rehabilitation Program has been provided the patient's referral information and pertinent patient details and history. The patient may call [address] [place] [address] at [phone] for additional information or questions. Contact information for [address] [place] [address] and other choices close to the patient's residence have been provided in the discharge instructions so that the patient may call and schedule an appointment when cleared by their physician.  Thank you for the referral.

## 2021-05-08 NOTE — PLAN OF CARE
Problem: Skin Integrity:  Goal: Will show no infection signs and symptoms  Description: Will show no infection signs and symptoms  5/8/2021 1303 by Armando Goodrich RN  Outcome: Met This Shift  5/8/2021 0339 by Joshua Hernandez RN  Outcome: Met This Shift  Goal: Absence of new skin breakdown  Description: Absence of new skin breakdown  5/8/2021 1303 by Armando Goodrich RN  Outcome: Met This Shift  5/8/2021 0339 by Joshua Hernandez RN  Outcome: Met This Shift     Problem: Cardiac:  Goal: Cardiovascular alteration will improve  Description: Cardiovascular alteration will improve  Outcome: Met This Shift  Goal: Satisfaction with pain management regimen will improve  Description: Satisfaction with pain management regimen will improve  Outcome: Met This Shift     Problem:  Activity:  Goal: Ability to tolerate increased activity will improve  Description: Ability to tolerate increased activity will improve  Outcome: Met This Shift     Problem: Coping:  Goal: Level of anxiety will decrease  Description: Level of anxiety will decrease  Outcome: Met This Shift  Goal: Ability to cope will improve  Description: Ability to cope will improve  Outcome: Met This Shift  Goal: Understanding of sexual limitations or changes related to disease process or condition will improve  Description: Understanding of sexual limitations or changes related to disease process or condition will improve  Outcome: Met This Shift     Problem: Safety:  Goal: Ability to remain free from injury will improve  Description: Ability to remain free from injury will improve  Outcome: Met This Shift
Problem: Skin Integrity:  Goal: Will show no infection signs and symptoms  Description: Will show no infection signs and symptoms  Outcome: Met This Shift     Problem: Skin Integrity:  Goal: Absence of new skin breakdown  Description: Absence of new skin breakdown  Outcome: Met This Shift
Problem: Skin Integrity:  Goal: Will show no infection signs and symptoms  Outcome: Met This Shift  Goal: Absence of new skin breakdown  Outcome: Met This Shift     Problem: Cardiac:  Goal: Cardiovascular alteration will improve  Outcome: Met This Shift  Goal: Satisfaction with pain management regimen will improve  Outcome: Met This Shift     Problem:  Activity:  Goal: Ability to tolerate increased activity will improve  Outcome: Met This Shift     Problem: Coping:  Goal: Level of anxiety will decrease  Outcome: Met This Shift  Goal: Ability to cope will improve  Outcome: Met This Shift  Goal: Understanding of sexual limitations or changes related to disease process or condition will improve  Outcome: Met This Shift     Problem: Safety:  Goal: Ability to remain free from injury will improve  Outcome: Met This Shift
of sexual limitations or changes related to disease process or condition will improve  5/8/2021 1409 by Marcell Whyte RN  Outcome: Completed  5/8/2021 1303 by Marcell Whyte RN  Outcome: Met This Shift     Problem: Safety:  Goal: Ability to remain free from injury will improve  Description: Ability to remain free from injury will improve  5/8/2021 1409 by Marcell Whyte RN  Outcome: Completed  5/8/2021 1303 by Marcell Whyte RN  Outcome: Met This Shift

## 2021-05-10 ENCOUNTER — CARE COORDINATION (OUTPATIENT)
Dept: CASE MANAGEMENT | Age: 76
End: 2021-05-10

## 2021-05-10 NOTE — CARE COORDINATION
Arnel [facility] Transitions Initial Follow Up Call    Call within 2 business days of discharge: Yes    Patient: Jason Hitchcock Patient : 1945   MRN: [id number]  Reason for Admission: chest pain  Discharge Date: 21 RARS: Readmission Risk Score: 10      Last Discharge [facility]       Complaint Diagnosis Description Type Department Provider    21 Chest Pain Chest pain, unspecified type ED to Hosp-Admission (Discharged) (ADMITTED) SE[facility] 7WE Yakelin Brady MD; Karen Carson,...        -First attempt to reach the patient for initial BPCI call post hospital discharge. Message left with CTN's contact information requesting return phone call. Follow Up  No future appointments.     Nate Ruiz RN

## 2021-05-11 ENCOUNTER — CARE COORDINATION (OUTPATIENT)
Dept: CASE MANAGEMENT | Age: 76
End: 2021-05-11

## 2021-05-11 NOTE — CARE COORDINATION
[facility]nel [facility] Transitions Initial Follow Up Call    Call within 2 business days of discharge: Yes    Patient: Samira Paris Patient : 1945   MRN: [id number]  Reason for Admission: chest pain  Discharge Date: 21 RARS: Readmission Risk Score: 10      Last Discharge [address] [place] [address]       Complaint Diagnosis Description Type Department Provider    21 Chest Pain Chest pain, unspecified type ED to Hosp-Admission (Discharged) (ADMITTED) YZ 7WE Lashell Olivares MD; Adolfo Mehta... Transitions of Care Initial Call        Challenges to be reviewed by the provider   Additional needs identified to be addressed with provider No  none             Method of communication with provider : none      Advance Care Planning:   Does patient have an Advance Directive:  decision maker updated. Was this a readmission? No  Patient stated reason for admission: chest pain  Patients top risk factors for readmission: medical condition-chest pain    Care Transition Nurse (CTN) contacted the patient by telephone to perform post hospital discharge assessment. Verified name and  with patient as identifiers. Provided introduction to self, and explanation of the CTN role. CTN reviewed discharge instructions, medical action plan and red flags with patient who verbalized understanding. Patient given an opportunity to ask questions and does not have any further questions or concerns at this time. Were discharge instructions available to patient? Yes. Reviewed appropriate site of care based on symptoms and resources available to patient including: PCP and Specialist. The patient agrees to contact the PCP office for questions related to their healthcare. Medication reconciliation was performed with patient, who verbalizes understanding of administration of home medications. Advised obtaining a 90-day supply of all daily and as-needed medications. 1111F not entered as non mercy PCP.       Non-face-to-face services provided:  Scheduled appointment with PCP-visit completed today(5/11/21)  Scheduled appointment with Specialist-patient to call cardiology  Obtained and reviewed discharge summary and/or continuity of care documents    Care Transitions 24 Hour Call    Do you have any ongoing symptoms?: No  Do you have a copy of your discharge instructions?: Yes  Do you have all of your prescriptions and are they filled?: Yes  Have you been contacted by a [facility] Pharmacist?: No  Have you scheduled your follow up appointment?: Yes (Comment: 5/11/21-saw PCP today)  How are you going to get to your appointment?: Car - family or friend to transport  Were you discharged with any Home Care or Post Acute Services: No  Do you feel like you have everything you need to keep you well at home?: Yes  Care Transitions Interventions  No Identified Needs       -Spoke with patient for initial BPCI care transition call post hospital discharge. Explained the role of Care Transition Nurse and the BPCI-A program.  -Patient denies any chest pain, states she just returned home from PCP visit and had labs/EKG completed. -Patient denies any needs, questions, or concerns at this time and politely declines further follow up calls. -CTN will hand off to the Care Transition Central team to follow to follow peripherally. Follow Up  No future appointments.     Eva Lee RN

## 2021-05-12 ENCOUNTER — CARE COORDINATION (OUTPATIENT)
Dept: CASE MANAGEMENT | Age: 76
End: 2021-05-12

## 2021-05-12 ENCOUNTER — TELEPHONE (OUTPATIENT)
Dept: ADMINISTRATIVE | Age: 76
End: 2021-05-12

## 2021-05-12 NOTE — CARE COORDINATION
Ar[facility] 45 Transitions Follow Up Call    2021    Patient: Kathy Campbell  Patient : 1945   MRN: <[id number]>  Reason for Admission:   Discharge Date: 21 RARS: Readmission Risk Score: 10    2021 Final  for admission 21-21. Patient no longer eligible for BPCI bundle due to excluded DRG.      Paulino Villegas RN

## 2021-06-29 ENCOUNTER — OFFICE VISIT (OUTPATIENT)
Dept: CARDIOLOGY CLINIC | Age: 76
End: 2021-06-29
Payer: MEDICARE

## 2021-06-29 VITALS
HEIGHT: 64 IN | SYSTOLIC BLOOD PRESSURE: 122 MMHG | BODY MASS INDEX: 36.57 KG/M2 | WEIGHT: 214.2 LBS | DIASTOLIC BLOOD PRESSURE: 62 MMHG | HEART RATE: 59 BPM | OXYGEN SATURATION: 96 % | RESPIRATION RATE: 19 BRPM

## 2021-06-29 DIAGNOSIS — Z88.8 ASPIRIN ALLERGY: ICD-10-CM

## 2021-06-29 DIAGNOSIS — R06.02 SOBOE (SHORTNESS OF BREATH ON EXERTION): ICD-10-CM

## 2021-06-29 DIAGNOSIS — I27.20 PULMONARY HTN (HCC): ICD-10-CM

## 2021-06-29 DIAGNOSIS — I31.39 PERICARDIAL EFFUSION: ICD-10-CM

## 2021-06-29 DIAGNOSIS — E78.5 DYSLIPIDEMIA: ICD-10-CM

## 2021-06-29 DIAGNOSIS — I10 ESSENTIAL HYPERTENSION: ICD-10-CM

## 2021-06-29 DIAGNOSIS — I25.10 CORONARY ARTERY DISEASE INVOLVING NATIVE CORONARY ARTERY OF NATIVE HEART WITHOUT ANGINA PECTORIS: Primary | ICD-10-CM

## 2021-06-29 DIAGNOSIS — E66.9 NON MORBID OBESITY: ICD-10-CM

## 2021-06-29 PROCEDURE — G8400 PT W/DXA NO RESULTS DOC: HCPCS | Performed by: INTERNAL MEDICINE

## 2021-06-29 PROCEDURE — 1090F PRES/ABSN URINE INCON ASSESS: CPT | Performed by: INTERNAL MEDICINE

## 2021-06-29 PROCEDURE — 1036F TOBACCO NON-USER: CPT | Performed by: INTERNAL MEDICINE

## 2021-06-29 PROCEDURE — 93000 ELECTROCARDIOGRAM COMPLETE: CPT | Performed by: INTERNAL MEDICINE

## 2021-06-29 PROCEDURE — 99214 OFFICE O/P EST MOD 30 MIN: CPT | Performed by: INTERNAL MEDICINE

## 2021-06-29 PROCEDURE — 4040F PNEUMOC VAC/ADMIN/RCVD: CPT | Performed by: INTERNAL MEDICINE

## 2021-06-29 PROCEDURE — G8417 CALC BMI ABV UP PARAM F/U: HCPCS | Performed by: INTERNAL MEDICINE

## 2021-06-29 PROCEDURE — G8427 DOCREV CUR MEDS BY ELIG CLIN: HCPCS | Performed by: INTERNAL MEDICINE

## 2021-06-29 PROCEDURE — 1123F ACP DISCUSS/DSCN MKR DOCD: CPT | Performed by: INTERNAL MEDICINE

## 2021-06-29 RX ORDER — GUAIFENESIN 400 MG/1
TABLET ORAL
COMMUNITY
Start: 2021-03-30 | End: 2022-10-10

## 2021-06-29 RX ORDER — CARVEDILOL 12.5 MG/1
12.5 TABLET ORAL 2 TIMES DAILY WITH MEALS
COMMUNITY
End: 2022-10-10

## 2021-06-29 NOTE — PATIENT INSTRUCTIONS
Call with Echo report  Take Brilinta today morning once home  Call for chest pain on exertion or more short of breath or heart racing

## 2021-06-29 NOTE — PROGRESS NOTES
OFFICE VISIT     PRIMARY CARE PHYSICIAN:      Millie Varghese DO       ALLERGIES / SENSITIVITIES:        Allergies   Allergen Reactions    Latex     Aspirin     Andrés-Allergin [Diphenhydramine]     Darvocet A500 [Propoxyphene N-Acetaminophen]     Fish-Derived Products     Honey Bee Venom [Bee Venom]     Lemon Oil     Niacin And Related     Nuts [Peanut Oil]     Penicillins     Shellfish-Derived Products     Wine [Alcohol]      Beer, wine and diet pop          REVIEWED MEDICATIONS:        Current Outpatient Medications:     guaiFENesin 400 MG tablet, TAKE 1 TABLET BY MOUTH TWICE DAILY, Disp: , Rfl:     carvedilol (COREG) 12.5 MG tablet, Take 12.5 mg by mouth 2 times daily (with meals), Disp: , Rfl:     ticagrelor (BRILINTA) 90 MG TABS tablet, Take 1 tablet by mouth 2 times daily, Disp: 60 tablet, Rfl: 11    metoprolol succinate (TOPROL XL) 50 MG extended release tablet, Take 1 tablet by mouth daily, Disp: 30 tablet, Rfl: 3    isosorbide mononitrate (IMDUR) 30 MG extended release tablet, Take 1 tablet by mouth daily, Disp: 30 tablet, Rfl: 3    atorvastatin (LIPITOR) 40 MG tablet, Take 1 tablet by mouth nightly, Disp: 30 tablet, Rfl: 3    vitamin D (CHOLECALCIFEROL) 25 MCG (1000 UT) TABS tablet, Take 1,000 Units by mouth daily, Disp: , Rfl:     fluticasone (FLONASE) 50 MCG/ACT nasal spray, 1 spray by Each Nostril route 2 times daily, Disp: , Rfl:     amLODIPine (NORVASC) 10 MG tablet, Take 10 mg by mouth daily, Disp: , Rfl:       S: REASON FOR VISIT:       Chief Complaint   Patient presents with    Hypertension     post cath - trouble hearing, swelling on legs and feet, SOB when active    Hyperlipidemia          History of Present Illness:       Office Visit for follow up of CAD, HTN, post-hospital visit   66 yr old Marlena Torres with history of HTN, VHD, recent NSTEMI with LAD stent in May 2021 came for f/u visit   Admitted for NSTEMI, had LHC with PCI or LAD on 5/5/21   On single antiplatelet therapy with Brilinta due to \"Aspirin allergy\"   She ran out her Brilinta about 3 weeks ago -she took once daily for few days before she ran out her Rx   Had edema feet, chronic   C/o mild SOB on exertion, No PND or Orthopnea   No hospitalizations or surgeries since last visit   Patient is compliant with all medications   Rob any exertional chest pain    No palpitations, dizzy or syncope. Active at home   Try to watch diet          Past Medical History:   Diagnosis Date    Hyperlipidemia     Hypertension     Thyroid disease             Past Surgical History:   Procedure Laterality Date    CARPAL TUNNEL RELEASE      CATARACT REMOVAL      CHOLECYSTECTOMY      CORONARY ANGIOPLASTY WITH STENT PLACEMENT  05/07/2021    Dr Humberto Anders LAD    FRACTURE SURGERY      HYSTERECTOMY      SKIN CANCER EXCISION      rt arm        History reviewed. No pertinent family history.        Social History     Tobacco Use    Smoking status: Never Smoker    Smokeless tobacco: Never Used   Substance Use Topics    Alcohol use: No     Comment: drinks 2 cups of coffee daily         Review of Systems:  Constitutional: negative for fever and chills, or significant weight loss  HEENT: negative for acute visual symptoms or auditory problems, no dysphagia  Respiratory: negative for cough, wheezing, or hemoptysis  Cardiovascular: negative for chest pain, palpitations, and dyspnea  Gastrointestinal: negative for abdominal pain, diarrhea, nausea and vomiting  Endocrine: Negative for polyuria and polydyspsia  Genitourinary:negative for dysuria and hematuria  Derm: negative for rash and skin lesion(s)  Neurological: negative for tingling, numbness, weakness, seizures and tremors  Endocrine: negative for polydipsia and polyuria  Musculoskeletal: negative for pain or tenderness  Psychiatric: negative for anxiety, depression, or suicidal ideations         O:  COMPLETE PHYSICAL EXAM:       /62   Pulse 59   Resp 19   Ht 5' 4\" (1.626 m)   Wt

## 2021-12-03 ENCOUNTER — APPOINTMENT (OUTPATIENT)
Dept: GENERAL RADIOLOGY | Age: 76
End: 2021-12-03
Payer: MEDICARE

## 2021-12-03 ENCOUNTER — HOSPITAL ENCOUNTER (EMERGENCY)
Age: 76
Discharge: HOME | End: 2021-12-03
Attending: EMERGENCY MEDICINE
Payer: MEDICARE

## 2021-12-03 VITALS
RESPIRATION RATE: 18 BRPM | HEIGHT: 65 IN | TEMPERATURE: 98 F | BODY MASS INDEX: 33.32 KG/M2 | HEART RATE: 74 BPM | SYSTOLIC BLOOD PRESSURE: 142 MMHG | WEIGHT: 200 LBS | OXYGEN SATURATION: 98 % | DIASTOLIC BLOOD PRESSURE: 92 MMHG

## 2021-12-03 DIAGNOSIS — U07.1 COVID-19 VIRUS DETECTED: Primary | ICD-10-CM

## 2021-12-03 LAB
ANION GAP SERPL CALCULATED.3IONS-SCNC: 18 MMOL/L (ref 7–16)
BUN BLDV-MCNC: 41 MG/DL (ref 6–23)
CALCIUM SERPL-MCNC: 9.4 MG/DL (ref 8.6–10.2)
CHLORIDE BLD-SCNC: 103 MMOL/L (ref 98–107)
CO2: 18 MMOL/L (ref 22–29)
CREAT SERPL-MCNC: 2.4 MG/DL (ref 0.5–1)
D DIMER: 236 NG/ML DDU
EKG ATRIAL RATE: 117 BPM
EKG Q-T INTERVAL: 354 MS
EKG QRS DURATION: 82 MS
EKG QTC CALCULATION (BAZETT): 418 MS
EKG R AXIS: 38 DEGREES
EKG T AXIS: -135 DEGREES
EKG VENTRICULAR RATE: 84 BPM
GFR AFRICAN AMERICAN: 24
GFR NON-AFRICAN AMERICAN: 20 ML/MIN/1.73
GLUCOSE BLD-MCNC: 120 MG/DL (ref 74–99)
HCT VFR BLD CALC: 41.2 % (ref 34–48)
HEMOGLOBIN: 13.5 G/DL (ref 11.5–15.5)
INFLUENZA A BY PCR: NOT DETECTED
INFLUENZA B BY PCR: NOT DETECTED
MCH RBC QN AUTO: 29.6 PG (ref 26–35)
MCHC RBC AUTO-ENTMCNC: 32.8 % (ref 32–34.5)
MCV RBC AUTO: 90.4 FL (ref 80–99.9)
PDW BLD-RTO: 14 FL (ref 11.5–15)
PLATELET # BLD: 163 E9/L (ref 130–450)
PMV BLD AUTO: 9.9 FL (ref 7–12)
POTASSIUM SERPL-SCNC: 4.2 MMOL/L (ref 3.5–5)
PRO-BNP: 1717 PG/ML (ref 0–450)
RBC # BLD: 4.56 E12/L (ref 3.5–5.5)
REASON FOR REJECTION: NORMAL
REJECTED TEST: NORMAL
SARS-COV-2, NAAT: DETECTED
SODIUM BLD-SCNC: 139 MMOL/L (ref 132–146)
TROPONIN, HIGH SENSITIVITY: 23 NG/L (ref 0–9)
TROPONIN, HIGH SENSITIVITY: 25 NG/L (ref 0–9)
TROPONIN, HIGH SENSITIVITY: 30 NG/L (ref 0–9)
WBC # BLD: 4.7 E9/L (ref 4.5–11.5)

## 2021-12-03 PROCEDURE — 93010 ELECTROCARDIOGRAM REPORT: CPT | Performed by: INTERNAL MEDICINE

## 2021-12-03 PROCEDURE — 85378 FIBRIN DEGRADE SEMIQUANT: CPT

## 2021-12-03 PROCEDURE — 87502 INFLUENZA DNA AMP PROBE: CPT

## 2021-12-03 PROCEDURE — 93005 ELECTROCARDIOGRAM TRACING: CPT | Performed by: PHYSICIAN ASSISTANT

## 2021-12-03 PROCEDURE — 85027 COMPLETE CBC AUTOMATED: CPT

## 2021-12-03 PROCEDURE — 99284 EMERGENCY DEPT VISIT MOD MDM: CPT

## 2021-12-03 PROCEDURE — 83880 ASSAY OF NATRIURETIC PEPTIDE: CPT

## 2021-12-03 PROCEDURE — 84484 ASSAY OF TROPONIN QUANT: CPT

## 2021-12-03 PROCEDURE — 87635 SARS-COV-2 COVID-19 AMP PRB: CPT

## 2021-12-03 PROCEDURE — 71046 X-RAY EXAM CHEST 2 VIEWS: CPT

## 2021-12-03 PROCEDURE — 80048 BASIC METABOLIC PNL TOTAL CA: CPT

## 2021-12-03 PROCEDURE — 2580000003 HC RX 258: Performed by: EMERGENCY MEDICINE

## 2021-12-03 RX ORDER — 0.9 % SODIUM CHLORIDE 0.9 %
1000 INTRAVENOUS SOLUTION INTRAVENOUS ONCE
Status: COMPLETED | OUTPATIENT
Start: 2021-12-03 | End: 2021-12-03

## 2021-12-03 RX ADMIN — SODIUM CHLORIDE 1000 ML: 9 INJECTION, SOLUTION INTRAVENOUS at 19:06

## 2021-12-03 NOTE — ED NOTES
Bed: 24  Expected date: 12/3/21  Expected time: 6:34 PM  Means of arrival:   Comments:  triage     Jama Graham RN  12/03/21 3027

## 2021-12-03 NOTE — ED NOTES
Department of Emergency Medicine  FIRST PROVIDER TRIAGE NOTE             Independent MLP         12/3/21  12:24 PM EST    Date of Encounter: 12/3/21   MRN: [id number]      HPI: Marquita Oliver is a 64 y.o. female who presents to the ED for Chest Pain (Patient c/o chest pain and some diarrhea )   FIRST CONTACT WITH PATIENT: 12:24 PM EST      ROS: Negative for back pain or urinary complaints. PE: Gen Appearance/Constitutional: alert  Neck: supple     Initial Plan of Care: All treatment areas with department are currently occupied. Plan to order/Initiate the following while awaiting opening in ED: labs, EKG and imaging studies.     Initial Plan of Care: Initiate Treatment-Testing, Proceed toTreatment Area When Bed Available for ED Attending/MLP to Continue Care    Electronically signed by SHAHBAZ Diego   DD: 12/3/21         SHAHBAZ Montenegro  12/03/21 1673

## 2021-12-03 NOTE — ED PROVIDER NOTES
Department of Emergency Medicine   ED  Provider Note  Admit Date/RoomTime: 12/3/2021  6:35 PM  ED Room: 24/24          History of Present Illness:  12/3/21, Time: 6:36 PM EST  Chief Complaint   Patient presents with    Chest Pain     Patient c/o chest pain and some diarrhea  patient states weakness and was exposed to covid a week ago                 Rafita Martino is a 64 y.o. female presenting to the ED for fatigue, diarrhea and cough, beginning 2-3 days. The complaint has been persistent, mild in severity, and worsened by nothing. Patient states that over the past few days she has been having a cough but no shortness of breath or chest pain. She states that she has been having diarrhea with no abdominal pain, nausea with no vomiting. She states she is feeling fatigued but denies any fevers or body ache. Review of Systems:   Pertinent positives and negatives are stated within HPI, all other systems reviewed and are negative.        --------------------------------------------- PAST HISTORY ---------------------------------------------  Past Medical History:  has a past medical history of Hyperlipidemia, Hypertension, and Thyroid disease. Past Surgical History:  has a past surgical history that includes Carpal tunnel release; Cholecystectomy; fracture surgery; Hysterectomy; Cataract removal; Skin cancer excision; and Coronary angioplasty with stent (05/07/2021). Social History:  reports that she has never smoked. She has never used smokeless tobacco. She reports that she does not drink alcohol and does not use drugs. Family History: family history is not on file. . Unless otherwise noted, family history is non contributory    The patients home medications have been reviewed.     Allergies: Latex, Aspirin, Andrsé-allergin [diphenhydramine], Darvocet a500 [propoxyphene n-acetaminophen], Fish-derived products, Honey bee venom [bee venom], Lemon oil, Niacin and related, Nuts [peanut oil], Penicillins, Shellfish-derived products, and Wine [alcohol]        ---------------------------------------------------PHYSICAL EXAM--------------------------------------    Constitutional/General: Alert and oriented x3  Head: Normocephalic and atraumatic  Eyes: PERRL, EOMI, sclera non icteric  Mouth: Oropharynx clear, handling secretions, no trismus, no asymmetry of the posterior oropharynx or uvular edema  Neck: Supple, full ROM, no stridor, no meningeal signs  Respiratory: Lungs clear to auscultation bilaterally, no wheezes, rales, or rhonchi. Not in respiratory distress  Cardiovascular:  Regular rate. Regular rhythm. No murmurs, no aortic murmurs, no gallops, or rubs. 2+ distal pulses. Equal extremity pulses. Chest: No chest wall tenderness  GI:  Abdomen Soft, Non tender, Non distended. No rebound, guarding, or rigidity. No pulsatile masses. Musculoskeletal: Moves all extremities x 4. Warm and well perfused, no clubbing, cyanosis, or edema. Capillary refill <3 seconds  Integument: skin warm and dry. No rashes. Neurologic: GCS 15, no focal deficits, symmetric strength 5/5 in the upper and lower extremities bilaterally  Psychiatric: Normal Affect          -------------------------------------------------- RESULTS -------------------------------------------------  I have personally reviewed all laboratory and imaging results for this patient. Results are listed below.      LABS: (Lab results interpreted by me)  Results for orders placed or performed during the hospital encounter of 12/03/21   COVID-19, Rapid    Specimen: Nasopharyngeal Swab; Nasal   Result Value Ref Range    SARS-CoV-2, NAAT DETECTED (A) Not Detected   Rapid influenza A/B antigens    Specimen: Nasopharyngeal; Nose   Result Value Ref Range    Influenza A by PCR Not Detected Not Detected    Influenza B by PCR Not Detected Not Detected   Basic metabolic panel   Result Value Ref Range    Sodium 139 132 - 146 mmol/L    Potassium 4.2 3.5 - 5.0 mmol/L    Chloride 103 98 - 107 mmol/L    CO2 18 (L) 22 - 29 mmol/L    Anion Gap 18 (H) 7 - 16 mmol/L    Glucose 120 (H) 74 - 99 mg/dL    BUN 41 (H) 6 - 23 mg/dL    CREATININE 2.4 (H) 0.5 - 1.0 mg/dL    GFR Non-African American 20 >=60 mL/min/1.73    GFR African American 24     Calcium 9.4 8.6 - 10.2 mg/dL   CBC   Result Value Ref Range    WBC 4.7 4.5 - 11.5 E9/L    RBC 4.56 3.50 - 5.50 E12/L    Hemoglobin 13.5 11.5 - 15.5 g/dL    Hematocrit 41.2 34.0 - 48.0 %    MCV 90.4 80.0 - 99.9 fL    MCH 29.6 26.0 - 35.0 pg    MCHC 32.8 32.0 - 34.5 %    RDW 14.0 11.5 - 15.0 fL    Platelets 156 031 - 395 E9/L    MPV 9.9 7.0 - 12.0 fL   Troponin I   Result Value Ref Range    Troponin, High Sensitivity 25 (H) 0 - 9 ng/L   Brain Natriuretic Peptide   Result Value Ref Range    Pro-BNP 1,717 (H) 0 - 450 pg/mL   D-Dimer, Quantitative   Result Value Ref Range    D-Dimer, Quant 236 ng/mL DDU   Troponin   Result Value Ref Range    Troponin, High Sensitivity 30 (H) 0 - 9 ng/L   SPECIMEN REJECTION   Result Value Ref Range    Rejected Test TRP5     Reason for Rejection see below    Troponin   Result Value Ref Range    Troponin, High Sensitivity 23 (H) 0 - 9 ng/L   EKG 12 Lead   Result Value Ref Range    Ventricular Rate 84 BPM    Atrial Rate 117 BPM    QRS Duration 82 ms    Q-T Interval 354 ms    QTc Calculation (Bazett) 418 ms    R Axis 38 degrees    T Axis -135 degrees   ,       RADIOLOGY:  Interpreted by Radiologist unless otherwise specified  XR CHEST (2 VW)   Final Result   No pneumonia or pleural effusion.                EKG Interpretation  Interpreted by emergency department physician, Dr. Renetta Palencia    Date of EK/3/21  Time: 1255    Rhythm: atrial fibrillation - controlled  Rate: 84  Axis: normal  Conduction: normal  ST Segments: no acute change  T Waves: no acute change    Clinical Impression: No findings suggestive of acute ischemia or injury  Comparison to prior EKG: stable as compared to patient's most recent EKG      ------------------------- NURSING NOTES AND VITALS REVIEWED ---------------------------   The nursing notes within the ED encounter and vital signs as below have been reviewed by myself  BP (!) 142/92   Pulse 74   Temp 98 °F (36.7 °C)   Resp 18   Ht 5' 5\" (1.651 m)   Wt 200 lb (90.7 kg)   SpO2 98%   BMI 33.28 kg/m²     Oxygen Saturation Interpretation: Normal    The patients available past medical records and past encounters were reviewed. ------------------------------ ED COURSE/MEDICAL DECISION MAKING----------------------  Medications   0.9 % sodium chloride bolus (0 mLs IntraVENous Stopped 12/3/21 2036)                 Medical Decision Making:     I, Dr. Danelle Carrizales, am the primary provider of record    68-year-old female presenting with cough, diarrhea and fatigue. Patient has family members that are Covid positive, has not had interaction for 2 weeks. She arrives hemodynamically stable with no increased work of breathing or hypoxia. Metabolic panel shows slight increasing creatinine but normal electrolytes. No leukocytosis or anemia. Troponins are stable. Age-adjusted D-dimer is acceptable. She is Covid positive, chest x-ray shows no infiltrate or abnormality. Patient was given IV fluids, feeling more comfortable. Discussion with patient regarding her positive Covid testing but reassuring chest x-ray. She does meet criteria for antibody therapy, agreed to the therapy. Appropriate paperwork was faxed to pharmacy, patient knows that she will be contacted for further management. She has strict instruction to return for any changes in condition or new symptoms. Re-Evaluations: This patient's ED course included: a personal history and physicial examination, re-evaluation prior to disposition, IV medications, cardiac monitoring and continuous pulse oximetry    This patient has remained hemodynamically stable, improved and been closely monitored during their ED course. Counseling:    The emergency

## 2021-12-04 NOTE — ED NOTES
Daughter Joleen Tom [phone]  Daughter Fifi [phone]  Granddaughter [facility] [phone]     Davin Howe LPN  79/23/20 6233

## 2021-12-06 ENCOUNTER — HOSPITAL ENCOUNTER (OUTPATIENT)
Dept: INFUSION THERAPY | Age: 76
Setting detail: INFUSION SERIES
Discharge: HOME | End: 2021-12-06
Payer: MEDICARE

## 2021-12-06 ENCOUNTER — CARE COORDINATION (OUTPATIENT)
Dept: CARE COORDINATION | Age: 76
End: 2021-12-06

## 2021-12-06 VITALS
HEART RATE: 88 BPM | RESPIRATION RATE: 18 BRPM | SYSTOLIC BLOOD PRESSURE: 111 MMHG | OXYGEN SATURATION: 100 % | DIASTOLIC BLOOD PRESSURE: 73 MMHG | TEMPERATURE: 98.2 F

## 2021-12-06 PROCEDURE — 2580000003 HC RX 258: Performed by: INTERNAL MEDICINE

## 2021-12-06 PROCEDURE — M0243 CASIRIVI AND IMDEVI INFUSION: HCPCS

## 2021-12-06 PROCEDURE — 6360000002 HC RX W HCPCS: Performed by: INTERNAL MEDICINE

## 2021-12-06 RX ORDER — SODIUM CHLORIDE 0.9 % (FLUSH) 0.9 %
5-40 SYRINGE (ML) INJECTION EVERY 12 HOURS SCHEDULED
Status: DISCONTINUED | OUTPATIENT
Start: 2021-12-06 | End: 2021-12-07 | Stop reason: HOSPADM

## 2021-12-06 RX ORDER — SODIUM CHLORIDE 0.9 % (FLUSH) 0.9 %
5-40 SYRINGE (ML) INJECTION PRN
Status: CANCELLED | OUTPATIENT
Start: 2021-12-06

## 2021-12-06 RX ORDER — METHYLPREDNISOLONE SODIUM SUCCINATE 125 MG/2ML
125 INJECTION, POWDER, LYOPHILIZED, FOR SOLUTION INTRAMUSCULAR; INTRAVENOUS
Status: CANCELLED | OUTPATIENT
Start: 2021-12-06 | End: 2021-12-06

## 2021-12-06 RX ORDER — SODIUM CHLORIDE 0.9 % (FLUSH) 0.9 %
5-40 SYRINGE (ML) INJECTION EVERY 12 HOURS SCHEDULED
Status: CANCELLED | OUTPATIENT
Start: 2021-12-06

## 2021-12-06 RX ORDER — SODIUM CHLORIDE 9 MG/ML
100 INJECTION, SOLUTION INTRAVENOUS CONTINUOUS PRN
Status: CANCELLED | OUTPATIENT
Start: 2021-12-06

## 2021-12-06 RX ORDER — SODIUM CHLORIDE 9 MG/ML
INJECTION, SOLUTION INTRAVENOUS CONTINUOUS PRN
Status: DISCONTINUED | OUTPATIENT
Start: 2021-12-06 | End: 2021-12-07 | Stop reason: HOSPADM

## 2021-12-06 RX ORDER — EPINEPHRINE 1 MG/ML
0.3 INJECTION, SOLUTION, CONCENTRATE INTRAVENOUS PRN
Status: CANCELLED | OUTPATIENT
Start: 2021-12-06

## 2021-12-06 RX ORDER — DIPHENHYDRAMINE HYDROCHLORIDE 50 MG/ML
50 INJECTION INTRAMUSCULAR; INTRAVENOUS
Status: CANCELLED | OUTPATIENT
Start: 2021-12-06 | End: 2021-12-06

## 2021-12-06 RX ORDER — SODIUM CHLORIDE 9 MG/ML
INJECTION, SOLUTION INTRAVENOUS CONTINUOUS PRN
Status: CANCELLED | OUTPATIENT
Start: 2021-12-06 | End: 2021-12-07

## 2021-12-06 RX ORDER — SODIUM CHLORIDE 9 MG/ML
25 INJECTION, SOLUTION INTRAVENOUS PRN
Status: CANCELLED | OUTPATIENT
Start: 2021-12-06

## 2021-12-06 RX ADMIN — Medication 1200 MG: at 17:18

## 2021-12-06 RX ADMIN — SODIUM CHLORIDE, PRESERVATIVE FREE 10 ML: 5 INJECTION INTRAVENOUS at 17:16

## 2021-12-06 RX ADMIN — SODIUM CHLORIDE: 9 INJECTION, SOLUTION INTRAVENOUS at 17:18

## 2021-12-06 RX ADMIN — SODIUM CHLORIDE, PRESERVATIVE FREE 10 ML: 5 INJECTION INTRAVENOUS at 17:49

## 2021-12-06 NOTE — CARE COORDINATION
Patient contacted regarding COVID-19 exposure, diagnosis and monoclonal antibody infusion follow up. Discussed COVID-19 related testing which was available at this time. Test results were positive. Patient informed of results, if available? Yes. Ambulatory Care Manager contacted the patient by telephone to perform post discharge assessment. Call within 2 business days of discharge: Yes. Verified name and  with patient as identifiers. Provided introduction to self, and explanation of the CTN/ACM role, and reason for call due to risk factors for infection and/or exposure to COVID-19. Symptoms reviewed with patient who verbalized the following symptoms: fatigue, pain or aching joints, cough, shortness of breath, dizziness/lightheadedness, no new symptoms, no worsening symptoms and sore throat. Due to no new or worsening symptoms encounter was not routed to provider for escalation. Discussed follow-up appointments. If no appointment was previously scheduled, appointment scheduling offered: Yes. [facility] follow up appointment(s): No future appointments. Non-[facility] follow up appointment(s): Pt reports that she will contact her PCP's office for f/u, declined ACM's assistance. Non-face-to-face services provided:  Obtained and reviewed discharge summary and/or continuity of care documents, educated pt on self-isolation precautions, rest, hydration, medications to take for fevers, headaches, or body aches, s/sx of worsening condition to report to ED for. Advance Care Planning:   Does patient have an Advance Directive:  reviewed and current. Educated patient about risk for severe COVID-19 due to risk factors according to CDC guidelines. ACM reviewed discharge instructions, medical action plan and red flag symptoms with the patient who verbalized understanding. Discussed COVID vaccination status: Yes. Education provided on COVID-19 vaccination as appropriate.  Discussed exposure protocols and quarantine with CDC Guidelines. Patient was given an opportunity to verbalize any questions and concerns and agrees to contact ACM or health care provider for questions related to their healthcare. Reviewed and educated patient on any new and changed medications related to discharge diagnosis, no medications prescribed in ED, pt waiting for call from infusion center to see if she is eligible for the antibody infusion therapy. Was patient discharged with a pulse oximeter? No Discussed and confirmed pulse oximeter discharge instructions and when to notify provider or seek emergency care. ACM provided contact information for any COVID related questions. Plan for follow-up call in 7-10 days based on severity of symptoms and risk factors.

## 2021-12-13 ENCOUNTER — CARE COORDINATION (OUTPATIENT)
Dept: CARE COORDINATION | Age: 76
End: 2021-12-13

## 2021-12-13 NOTE — CARE COORDINATION
Patient contacted regarding COVID-19 diagnosis and monoclonal antibody infusion follow up. Discussed COVID-19 related testing which was available at this time. Test results were positive. Patient informed of results, if available? Yes    Ambulatory Care Manager contacted the patient by telephone to perform follow-up assessment. Verified name and  with patient as identifiers. Patient has following risk factors of: HTN. Symptoms reviewed with patient who verbalized the following symptoms: fatigue, shortness of breath, dizziness/lightheadedness, no new symptoms and no worsening symptoms. Due to no new or worsening symptoms encounter was not routed to provider for escalation. Educated patient about risk for severe COVID-19 due to risk factors according to CDC guidelines. ACM reviewed discharge instructions, medical action plan and red flag symptoms with the patient who verbalized understanding. Discussed COVID vaccination status: Yes. Education provided on COVID-19 vaccination as appropriate. Discussed exposure protocols and quarantine with CDC Guidelines. Patient was given an opportunity to verbalize any questions and concerns and agrees to contact ACM or health care provider for questions related to their healthcare. Was patient discharged with a pulse oximeter? No Discussed and confirmed pulse oximeter discharge instructions and when to notify provider or seek emergency care. ACM provided contact information. No further follow-up call identified based on severity of symptoms and risk factors. ACM encouraged pt to f/u with her PCP, ACM offered to assist pt, pt declined assistance and reports that she will call her PCP herself.

## 2022-03-22 ENCOUNTER — TELEPHONE (OUTPATIENT)
Dept: CARDIOLOGY CLINIC | Age: 77
End: 2022-03-22

## 2022-03-22 NOTE — TELEPHONE ENCOUNTER
Rome Santana called regarding an old never done echo order from 06/29/21. Pt also was to come back in 6 months but was not scheduled. Your next available is may. Do you want the echo done still and if so, before or after follow up?  Please advose

## 2022-03-26 ENCOUNTER — HOSPITAL ENCOUNTER (EMERGENCY)
Age: 77
Discharge: HOME | End: 2022-03-26
Attending: EMERGENCY MEDICINE
Payer: MEDICARE

## 2022-03-26 ENCOUNTER — APPOINTMENT (OUTPATIENT)
Dept: GENERAL RADIOLOGY | Age: 77
End: 2022-03-26
Payer: MEDICARE

## 2022-03-26 ENCOUNTER — APPOINTMENT (OUTPATIENT)
Dept: CT IMAGING | Age: 77
End: 2022-03-26
Payer: MEDICARE

## 2022-03-26 VITALS
HEIGHT: 64 IN | DIASTOLIC BLOOD PRESSURE: 78 MMHG | TEMPERATURE: 97.6 F | HEART RATE: 118 BPM | RESPIRATION RATE: 15 BRPM | OXYGEN SATURATION: 98 % | SYSTOLIC BLOOD PRESSURE: 130 MMHG | BODY MASS INDEX: 34.15 KG/M2 | WEIGHT: 200 LBS

## 2022-03-26 DIAGNOSIS — I10 HYPERTENSION, UNSPECIFIED TYPE: Primary | ICD-10-CM

## 2022-03-26 LAB
ALBUMIN SERPL-MCNC: 4.2 G/DL (ref 3.5–5.2)
ALP BLD-CCNC: 76 U/L (ref 35–104)
ALT SERPL-CCNC: 13 U/L (ref 0–32)
ANION GAP SERPL CALCULATED.3IONS-SCNC: 12 MMOL/L (ref 7–16)
AST SERPL-CCNC: 15 U/L (ref 0–31)
BACTERIA: ABNORMAL /HPF
BASOPHILS ABSOLUTE: 0.04 E9/L (ref 0–0.2)
BASOPHILS RELATIVE PERCENT: 0.6 % (ref 0–2)
BILIRUB SERPL-MCNC: 0.8 MG/DL (ref 0–1.2)
BILIRUBIN URINE: NEGATIVE
BLOOD, URINE: ABNORMAL
BUN BLDV-MCNC: 24 MG/DL (ref 6–23)
CALCIUM SERPL-MCNC: 9.4 MG/DL (ref 8.6–10.2)
CHLORIDE BLD-SCNC: 105 MMOL/L (ref 98–107)
CLARITY: CLEAR
CO2: 22 MMOL/L (ref 22–29)
COLOR: YELLOW
CREAT SERPL-MCNC: 1.4 MG/DL (ref 0.5–1)
EOSINOPHILS ABSOLUTE: 0.15 E9/L (ref 0.05–0.5)
EOSINOPHILS RELATIVE PERCENT: 2.4 % (ref 0–6)
GFR AFRICAN AMERICAN: 44
GFR NON-AFRICAN AMERICAN: 36 ML/MIN/1.73
GLUCOSE BLD-MCNC: 124 MG/DL (ref 74–99)
GLUCOSE URINE: NEGATIVE MG/DL
HCT VFR BLD CALC: 41.5 % (ref 34–48)
HEMOGLOBIN: 13.1 G/DL (ref 11.5–15.5)
IMMATURE GRANULOCYTES #: 0.02 E9/L
IMMATURE GRANULOCYTES %: 0.3 % (ref 0–5)
KETONES, URINE: NEGATIVE MG/DL
LACTIC ACID: 1.4 MMOL/L (ref 0.5–2.2)
LEUKOCYTE ESTERASE, URINE: ABNORMAL
LYMPHOCYTES ABSOLUTE: 1.35 E9/L (ref 1.5–4)
LYMPHOCYTES RELATIVE PERCENT: 21.5 % (ref 20–42)
MCH RBC QN AUTO: 29.9 PG (ref 26–35)
MCHC RBC AUTO-ENTMCNC: 31.6 % (ref 32–34.5)
MCV RBC AUTO: 94.7 FL (ref 80–99.9)
MONOCYTES ABSOLUTE: 0.58 E9/L (ref 0.1–0.95)
MONOCYTES RELATIVE PERCENT: 9.2 % (ref 2–12)
NEUTROPHILS ABSOLUTE: 4.14 E9/L (ref 1.8–7.3)
NEUTROPHILS RELATIVE PERCENT: 66 % (ref 43–80)
NITRITE, URINE: NEGATIVE
PDW BLD-RTO: 14.2 FL (ref 11.5–15)
PH UA: 5.5 (ref 5–9)
PLATELET # BLD: 196 E9/L (ref 130–450)
PMV BLD AUTO: 9.5 FL (ref 7–12)
POTASSIUM REFLEX MAGNESIUM: 4.5 MMOL/L (ref 3.5–5)
PROTEIN UA: NEGATIVE MG/DL
RBC # BLD: 4.38 E12/L (ref 3.5–5.5)
RBC UA: ABNORMAL /HPF (ref 0–2)
SODIUM BLD-SCNC: 139 MMOL/L (ref 132–146)
SPECIFIC GRAVITY UA: 1.01 (ref 1–1.03)
TOTAL PROTEIN: 6.9 G/DL (ref 6.4–8.3)
TROPONIN, HIGH SENSITIVITY: 14 NG/L (ref 0–9)
TROPONIN, HIGH SENSITIVITY: 15 NG/L (ref 0–9)
UROBILINOGEN, URINE: 0.2 E.U./DL
WBC # BLD: 6.3 E9/L (ref 4.5–11.5)
WBC UA: ABNORMAL /HPF (ref 0–5)

## 2022-03-26 PROCEDURE — 36415 COLL VENOUS BLD VENIPUNCTURE: CPT

## 2022-03-26 PROCEDURE — 84484 ASSAY OF TROPONIN QUANT: CPT

## 2022-03-26 PROCEDURE — 83605 ASSAY OF LACTIC ACID: CPT

## 2022-03-26 PROCEDURE — 81001 URINALYSIS AUTO W/SCOPE: CPT

## 2022-03-26 PROCEDURE — 99285 EMERGENCY DEPT VISIT HI MDM: CPT

## 2022-03-26 PROCEDURE — 71046 X-RAY EXAM CHEST 2 VIEWS: CPT

## 2022-03-26 PROCEDURE — 80053 COMPREHEN METABOLIC PANEL: CPT

## 2022-03-26 PROCEDURE — 93005 ELECTROCARDIOGRAM TRACING: CPT | Performed by: PHYSICIAN ASSISTANT

## 2022-03-26 PROCEDURE — 70450 CT HEAD/BRAIN W/O DYE: CPT

## 2022-03-26 PROCEDURE — 85025 COMPLETE CBC W/AUTO DIFF WBC: CPT

## 2022-03-26 NOTE — ED PROVIDER NOTES
HPI:  3/26/22, Time: 4:25 PM EDT        Jerry Downing is a 64 y.o. female presenting to the ED for hypertension but otherwise asymptomatic per patient, beginning 3 hours ago. The complaint has been persistent, moderate in severity, and worsened by nothing. Patient states she has felt like her blood pressure was high then she checked her blood pressure and it was 162/101 per the patient. She continues to deny any chest pain, nausea, vomiting, diarrhea, shortness of breath. .     Patient denies fever/chills, sore throat, cough, congestion, chest pain, shortness of breath, edema, headache, visual disturbances, focal paresthesias, focal weakness, abdominal pain, nausea, vomiting, diarrhea, constipation, dysuria, hematuria, trauma, neck or back pain, rash or other complaints. ROS:   A complete review of systems was performed and all pertinent positives and negatives are stated within HPI, all other systems reviewed and are negative.            --------------------------------------------- PAST HISTORY ---------------------------------------------  Past Medical History:  has a past medical history of Hyperlipidemia, Hypertension, and Thyroid disease. Past Surgical History:  has a past surgical history that includes Carpal tunnel release; Cholecystectomy; fracture surgery; Hysterectomy; Cataract removal; Skin cancer excision; and Coronary angioplasty with stent (05/07/2021). Social History:  reports that she has never smoked. She has never used smokeless tobacco. She reports that she does not drink alcohol and does not use drugs. Family History: family history is not on file. The patients home medications have been reviewed.     Allergies: Latex, Aspirin, Andrés-allergin [diphenhydramine], Darvocet a500 [propoxyphene n-acetaminophen], Fish-derived products, Honey bee venom [bee venom], Lemon oil, Niacin and related, Nuts [peanut oil], Penicillins, Shellfish-derived products, and Wine [alcohol]        ----------------------------------------PHYSICAL EXAM--------------------------------------    Constitutional:  Well developed, well nourished, no acute distress, non-toxic appearance   Eyes:  PERRL, conjunctiva normal, EOMI  HENT:  Atraumatic, external ears normal, nose normal, oropharynx moist, no pharyngeal exudates. Neck- normal range of motion, no nuchal rigidity   Respiratory:  No respiratory distress, normal breath sounds, no rales, no wheezing   Cardiovascular:   Tachycardic rate, irregular rhythm,  no murmurs, no gallops, no rubs. Radial and DP pulses 2+ bilaterally. GI:  Soft, nondistended, normal bowel sounds, nontender, no organomegaly, no mass, no rebound, no guarding   :  No costovertebral angle tenderness   Musculoskeletal:  No edema, no tenderness, no deformities. Back- no tenderness  Integument:  Well hydrated, no rash. Adequate perfusion. Lymphatic:  No cervical lymphadenopathy noted   Neurologic:  Alert & oriented x 3, CN 2-12 normal, normal motor function, normal sensory function, no focal deficits noted. Psychiatric:  Speech and behavior appropriate       -------------------------------------------------- RESULTS -------------------------------------------------  I have personally reviewed all laboratory and imaging results for this patient. Results are listed below.      LABS:  Results for orders placed or performed during the hospital encounter of 03/26/22   CBC with Auto Differential   Result Value Ref Range    WBC 6.3 4.5 - 11.5 E9/L    RBC 4.38 3.50 - 5.50 E12/L    Hemoglobin 13.1 11.5 - 15.5 g/dL    Hematocrit 41.5 34.0 - 48.0 %    MCV 94.7 80.0 - 99.9 fL    MCH 29.9 26.0 - 35.0 pg    MCHC 31.6 (L) 32.0 - 34.5 %    RDW 14.2 11.5 - 15.0 fL    Platelets 286 430 - 945 E9/L    MPV 9.5 7.0 - 12.0 fL    Neutrophils % 66.0 43.0 - 80.0 %    Immature Granulocytes % 0.3 0.0 - 5.0 %    Lymphocytes % 21.5 20.0 - 42.0 %    Monocytes % 9.2 2.0 - 12.0 %    Eosinophils % 2.4 0.0 - 6.0 %    Basophils % 0.6 0.0 - 2.0 %    Neutrophils Absolute 4.14 1.80 - 7.30 E9/L    Immature Granulocytes # 0.02 E9/L    Lymphocytes Absolute 1.35 (L) 1.50 - 4.00 E9/L    Monocytes Absolute 0.58 0.10 - 0.95 E9/L    Eosinophils Absolute 0.15 0.05 - 0.50 E9/L    Basophils Absolute 0.04 0.00 - 0.20 E9/L   Comprehensive Metabolic Panel w/ Reflex to MG   Result Value Ref Range    Sodium 139 132 - 146 mmol/L    Potassium reflex Magnesium 4.5 3.5 - 5.0 mmol/L    Chloride 105 98 - 107 mmol/L    CO2 22 22 - 29 mmol/L    Anion Gap 12 7 - 16 mmol/L    Glucose 124 (H) 74 - 99 mg/dL    BUN 24 (H) 6 - 23 mg/dL    CREATININE 1.4 (H) 0.5 - 1.0 mg/dL    GFR Non-African American 36 >=60 mL/min/1.73    GFR African American 44     Calcium 9.4 8.6 - 10.2 mg/dL    Total Protein 6.9 6.4 - 8.3 g/dL    Albumin 4.2 3.5 - 5.2 g/dL    Total Bilirubin 0.8 0.0 - 1.2 mg/dL    Alkaline Phosphatase 76 35 - 104 U/L    ALT 13 0 - 32 U/L    AST 15 0 - 31 U/L   Troponin   Result Value Ref Range    Troponin, High Sensitivity 14 (H) 0 - 9 ng/L   Lactic Acid   Result Value Ref Range    Lactic Acid 1.4 0.5 - 2.2 mmol/L   Urinalysis with Microscopic   Result Value Ref Range    Color, UA Yellow Straw/Yellow    Clarity, UA Clear Clear    Glucose, Ur Negative Negative mg/dL    Bilirubin Urine Negative Negative    Ketones, Urine Negative Negative mg/dL    Specific Gravity, UA 1.015 1.005 - 1.030    Blood, Urine TRACE-INTACT Negative    pH, UA 5.5 5.0 - 9.0    Protein, UA Negative Negative mg/dL    Urobilinogen, Urine 0.2 <2.0 E.U./dL    Nitrite, Urine Negative Negative    Leukocyte Esterase, Urine SMALL (A) Negative    WBC, UA 1-3 0 - 5 /HPF    RBC, UA NONE 0 - 2 /HPF    Bacteria, UA NONE SEEN None Seen /HPF   Troponin   Result Value Ref Range    Troponin, High Sensitivity 15 (H) 0 - 9 ng/L   EKG 12 Lead   Result Value Ref Range    Ventricular Rate 105 BPM    Atrial Rate 178 BPM    QRS Duration 86 ms    Q-T Interval 320 ms    QTc Calculation (Bazett) 422 ms R Axis 74 degrees    T Axis -94 degrees       RADIOLOGY:  Interpreted by Radiologist.  CT HEAD WO CONTRAST   Final Result   No acute intracranial abnormality or hemorrhage. Old lacunar infarct, left basal ganglia. XR CHEST (2 VW)   Final Result   Mild cardiomegaly. No evidence of acute process. EKG Interpretation by Me  Time: 1155  Rhythm: atrial fibrillation - rapid  Rate:Tachycardic  Axis: normal  Conduction: normal  ST Segments: no acute change  T Waves: no acute change  Clinical Impression: no acute changes  Comparison to prior EKG: stable as compared to patient's most recent EKG      ------------------------- NURSING NOTES AND VITALS REVIEWED ---------------------------  The nursing notes within the ED encounter and vital signs as below have been reviewed by myself. /78   Pulse 118   Temp 97.6 °F (36.4 °C) (Oral)   Resp 15   Ht 5' 4\" (1.626 m)   Wt 200 lb (90.7 kg)   SpO2 98%   BMI 34.33 kg/m²   Oxygen Saturation Interpretation: Normal      The patients available past medical records and past encounters were reviewed. ------------------------------ ED COURSE/MEDICAL DECISION MAKING----------------------  Medications - No data to display       MEDICATIONS  Discharge Medication List as of 3/26/2022  4:51 PM              Medical Decision Making:    Patient presents emergenc department due to having hypertension at home. While evaluating in the emergency department was found that she had run out of one of her medications and states she already called in and got a refill that she was going to  later today. In addition her blood pressure also improved on its own to blood pressure 130/78 upon discharge. Reevaluation was reassuring delta Lita Corrales was also reassuring and patient continued to have no chest pain or shortness of breath. Patient was explicitly instructed on specific signs and symptoms on which to return to the emergency room for.  Patient was instructed to return to the ER for any new or worsening symptoms. Additional discharge instructions were given verbally. All questions were answered. Patient is comfortable and agreeable with discharge plan. Patient in no acute distress and non-toxic in appearance. This patient's ED course included: re-evaluation prior to disposition, multiple bedside re-evaluations, IV medications, cardiac monitoring, continuous pulse oximetry, complex medical decision making and emergency management and a personal history and physicial eaxmination    This patient has remained hemodynamically stable and been closely monitored during their ED course. Re-Evaluations:  Time: 1630   Re-evaluation. Patients symptoms show no change  Repeat physical examination is not changed            Counseling: The emergency provider has spoken with the patient and discussed todays results, in addition to providing specific details for the plan of care and counseling regarding the diagnosis and prognosis. Questions are answered at this time and they are agreeable with the plan.    --------------------------- IMPRESSION AND DISPOSITION ---------------------------------    IMPRESSION  1.  Hypertension, unspecified type        DISPOSITION  Disposition: Discharge to home  Patient condition is stable              Jackie Del Rio DO  Resident  03/27/22 1707

## 2022-03-26 NOTE — ED NOTES
Department of Emergency Medicine  FIRST PROVIDER TRIAGE NOTE             Independent MLP           3/26/22  11:19 AM EDT    Date of Encounter: 3/26/22   MRN: [id number]      HPI: Letha Gould is a 64 y.o. female who presents to the ED for Hypertension (Intermittent left sided headache\"I felt my whole body pounding, i took my BP and its was 162/101\"; denies chest pain sob n/v/d fever)  intermittent left sided headache, +brillinta     ROS: Negative for cp or sob. PE: Gen Appearance/Constitutional: alert  Musculoskeletal: moves all extremities x 4     Initial Plan of Care: All treatment areas with department are currently occupied. Plan to order/Initiate the following while awaiting opening in ED: labs, EKG and imaging studies.   Initiate Treatment-Testing, Proceed toTreatment Area When Bed Available for ED Attending/MLP to Continue Care    Electronically signed by Santino Plaza PA-C   DD: 3/26/22         Santino Plaza PA-C  03/26/22 7522

## 2022-03-28 LAB
EKG ATRIAL RATE: 178 BPM
EKG Q-T INTERVAL: 320 MS
EKG QRS DURATION: 86 MS
EKG QTC CALCULATION (BAZETT): 422 MS
EKG R AXIS: 74 DEGREES
EKG T AXIS: -94 DEGREES
EKG VENTRICULAR RATE: 105 BPM

## 2022-03-28 PROCEDURE — 93010 ELECTROCARDIOGRAM REPORT: CPT | Performed by: INTERNAL MEDICINE

## 2022-04-29 ENCOUNTER — TELEPHONE (OUTPATIENT)
Dept: NON INVASIVE DIAGNOSTICS | Age: 77
End: 2022-04-29

## 2022-05-02 ENCOUNTER — HOSPITAL ENCOUNTER (OUTPATIENT)
Dept: NON INVASIVE DIAGNOSTICS | Age: 77
Discharge: HOME | End: 2022-05-02
Payer: MEDICARE

## 2022-05-02 LAB
LV EF: 64 %
LVEF MODALITY: NORMAL

## 2022-05-02 PROCEDURE — 93306 TTE W/DOPPLER COMPLETE: CPT

## 2022-05-03 ENCOUNTER — OFFICE VISIT (OUTPATIENT)
Dept: CARDIOLOGY CLINIC | Age: 77
End: 2022-05-03
Payer: MEDICARE

## 2022-05-03 VITALS
RESPIRATION RATE: 20 BRPM | DIASTOLIC BLOOD PRESSURE: 80 MMHG | HEART RATE: 90 BPM | SYSTOLIC BLOOD PRESSURE: 110 MMHG | BODY MASS INDEX: 34.11 KG/M2 | HEIGHT: 64 IN | WEIGHT: 199.8 LBS

## 2022-05-03 DIAGNOSIS — E66.9 NON MORBID OBESITY: ICD-10-CM

## 2022-05-03 DIAGNOSIS — I25.10 CORONARY ARTERY DISEASE, UNSPECIFIED VESSEL OR LESION TYPE, UNSPECIFIED WHETHER ANGINA PRESENT, UNSPECIFIED WHETHER NATIVE OR TRANSPLANTED HEART: Primary | ICD-10-CM

## 2022-05-03 DIAGNOSIS — Z88.8 ASPIRIN ALLERGY: ICD-10-CM

## 2022-05-03 DIAGNOSIS — I10 ESSENTIAL HYPERTENSION: ICD-10-CM

## 2022-05-03 DIAGNOSIS — E78.5 DYSLIPIDEMIA: ICD-10-CM

## 2022-05-03 DIAGNOSIS — Z86.16 HISTORY OF COVID-19: ICD-10-CM

## 2022-05-03 PROCEDURE — 1036F TOBACCO NON-USER: CPT | Performed by: INTERNAL MEDICINE

## 2022-05-03 PROCEDURE — 1090F PRES/ABSN URINE INCON ASSESS: CPT | Performed by: INTERNAL MEDICINE

## 2022-05-03 PROCEDURE — G8427 DOCREV CUR MEDS BY ELIG CLIN: HCPCS | Performed by: INTERNAL MEDICINE

## 2022-05-03 PROCEDURE — 4040F PNEUMOC VAC/ADMIN/RCVD: CPT | Performed by: INTERNAL MEDICINE

## 2022-05-03 PROCEDURE — 93000 ELECTROCARDIOGRAM COMPLETE: CPT | Performed by: INTERNAL MEDICINE

## 2022-05-03 PROCEDURE — 1123F ACP DISCUSS/DSCN MKR DOCD: CPT | Performed by: INTERNAL MEDICINE

## 2022-05-03 PROCEDURE — G8417 CALC BMI ABV UP PARAM F/U: HCPCS | Performed by: INTERNAL MEDICINE

## 2022-05-03 PROCEDURE — 99214 OFFICE O/P EST MOD 30 MIN: CPT | Performed by: INTERNAL MEDICINE

## 2022-05-03 PROCEDURE — G8400 PT W/DXA NO RESULTS DOC: HCPCS | Performed by: INTERNAL MEDICINE

## 2022-05-03 RX ORDER — CLOPIDOGREL BISULFATE 75 MG/1
75 TABLET ORAL DAILY
Qty: 30 TABLET | Refills: 9
Start: 2022-05-03 | End: 2022-05-04 | Stop reason: SDUPTHER

## 2022-05-03 NOTE — PROGRESS NOTES
OFFICE VISIT     PRIMARY CARE PHYSICIAN:      Lurdes Curiel DO       ALLERGIES / SENSITIVITIES:        Allergies   Allergen Reactions    Latex     Aspirin     Andrés-Allergin [Diphenhydramine]     Darvocet A500 [Propoxyphene N-Acetaminophen]     Fish-Derived Products     Honey Bee Venom [Bee Venom]     Lemon Oil     Niacin And Related     Nuts [Peanut Oil]     Penicillins     Shellfish-Derived Products     Wine [Alcohol]      Beer, wine and diet pop          REVIEWED MEDICATIONS:        Current Outpatient Medications:     clopidogrel (PLAVIX) 75 MG tablet, Take 1 tablet by mouth daily, Disp: 30 tablet, Rfl: 9    apixaban (ELIQUIS) 5 MG TABS tablet, Take 1 tablet by mouth 2 times daily, Disp: 60 tablet, Rfl: 9    guaiFENesin 400 MG tablet, TAKE 1 TABLET BY MOUTH TWICE DAILY, Disp: , Rfl:     carvedilol (COREG) 12.5 MG tablet, Take 12.5 mg by mouth 2 times daily (with meals), Disp: , Rfl:     metoprolol succinate (TOPROL XL) 50 MG extended release tablet, Take 1 tablet by mouth daily, Disp: 30 tablet, Rfl: 3    isosorbide mononitrate (IMDUR) 30 MG extended release tablet, Take 1 tablet by mouth daily, Disp: 30 tablet, Rfl: 3    atorvastatin (LIPITOR) 40 MG tablet, Take 1 tablet by mouth nightly, Disp: 30 tablet, Rfl: 3    vitamin D (CHOLECALCIFEROL) 25 MCG (1000 UT) TABS tablet, Take 1,000 Units by mouth daily, Disp: , Rfl:     fluticasone (FLONASE) 50 MCG/ACT nasal spray, 1 spray by Each Nostril route 2 times daily, Disp: , Rfl:     amLODIPine (NORVASC) 10 MG tablet, Take 10 mg by mouth daily, Disp: , Rfl:       S: REASON FOR VISIT:       Chief Complaint   Patient presents with    Coronary Artery Disease     10 ov -no c/o     Hypertension          History of Present Illness:       Office Visit for follow up of CAD, HTN, VHD              64 yr old Martin Dinh with history of HTN, VHD, CAD s/p LAD stent in May 2021 came for f/u visit   Had Echo yesterday   No hospitalizations or surgeries since last visit   Patient is compliant with all medications   Seen in [facility] march 26 for Headache and HTN   Had COVID in 12/2021  - Given Casirivimab/Imdevimab infusion. Rob any exertional chest pain    Mild SOBOE, chronic. Sleep on on pillow   Has chronic edema, mote towards evening; Occ sore on legs after walking, not every day   No palpitations, dizzy or syncope. Active at home   Try to watch diet          Past Medical History:   Diagnosis Date    Hyperlipidemia     Hypertension     Thyroid disease             Past Surgical History:   Procedure Laterality Date    CARPAL TUNNEL RELEASE      CATARACT REMOVAL      CHOLECYSTECTOMY      CORONARY ANGIOPLASTY WITH STENT PLACEMENT  05/07/2021    Dr Tram Garcia LAD    FRACTURE SURGERY      HYSTERECTOMY      SKIN CANCER EXCISION      rt arm        No family history on file.        Social History     Tobacco Use    Smoking status: Never Smoker    Smokeless tobacco: Never Used   Substance Use Topics    Alcohol use: No     Comment: drinks 2 cups of coffee daily         Review of Systems:    Constitutional: negative for fever and chills, or significant weight loss  HEENT: negative for acute visual symptoms or auditory problems, no dysphagia  Respiratory: negative for cough, wheezing, or hemoptysis  Cardiovascular: negative for chest pain, palpitations, and +ve dyspnea  Gastrointestinal: negative for abdominal pain, diarrhea, nausea and vomiting  Endocrine: Negative for polyuria and polydyspsia  Genitourinary: negative for dysuria and hematuria  Derm: negative for rash and skin lesion(s)  Neurological: negative for tingling, numbness, weakness, seizures  Endocrine: negative for polydipsia and polyuria  Musculoskeletal: Legs sore, occ.with walking  Psychiatric: negative for anxiety, depression, or suicidal ideations         O:  COMPLETE PHYSICAL EXAM:       /80   Pulse 90   Resp 20   Ht 5' 4\" (1.626 m)   Wt 199 lb 12.8 oz (90.6 kg)   BMI 34.30 kg/m²       General: Patient alert, comfortable, no distress. Appears stated age. HEENT:    Pupils equal, no icterus; Tongue moist.   Neck:              No masses, Thyroid not palpable. No elevated JVD, No carotid bruit. Chest:   Normal configuration, non tender. Lungs:   Clear to auscultation bilaterally except few scattered rhonchi. Cardiovascular:  Irregularly irregular rhythm, 1/6 systolic murmur, No S3, no palpable thrills. Abdomen:  Soft, Bowel sounds normal, no pulsatile abdominal aorta, no palpable masses. Extremities:  + edema. Distal pulses palpable. No cyanosis, no clubbing. Skin:   Good turgor, warm and dry, no cyanosis. Musculoskeletal: No joint swelling or deformity. Neuro:   Cranial nerves grossly intact; No focal neurologic deficit. Psych:   Alert, good mood and effect. REVIEW OF DIAGNOSTIC TESTS:        Electrocardiogram: Reviewed   Labs -3/2022  Noted    Echo 5/2/2022  Summary   Normal left ventricular chamber size. Normal left ventricular systolic function, LVEF is 64%. Stage III diastolic dysfunction. Left atrium is enlarged. Mildly increased left atrial volume. Interatrial septum not well visualized but appears intact. Mild right ventricular enlargement with normal function, TAPSE 1.8cm. There is mild mitral regurgitation. No mitral valve prolapse. No hemodynamically significant aortic stenosis. There is mild tricuspid regurgitation, RVSP 33mmHg. Normal aortic root size. No hemodynamically significant pericardial effusion. Epicardial fat. No intra cardiac mass or thrombus. Prior echo from 2/2017 also showed small pericardial effusion and mild Pulmonary HTN. Left Heart Cath 5/5/2021  CONCLUSIONS:  1.  Coronary artery. a.  Left main.  Heavily calcified vessel, which is probably diffusely  diseased, but with no apparent angiographic luminal narrowings and with  no dampening of pressure with the diagnostic over the guide catheter.   b. Milly Pheasant vessel with 95% thrombotic subtotal proximal vessel  occlusion and 50% mid vessel disease with KIRK-2 flow in the vessel. 70% ostial/proximal stenosis of a moderate size diagonal branch.  c.  LCX.  Large dominant vessel, which is heavily calcified in its  proximal segment with 40% proximal vessel narrowing, 70% stenosis of the  first marginal branch, 50% proximal stenosis of the left posterolateral  branch and 70% ostial stenosis of the left posterior descending artery  branch. d. Maninder Gutierrez.  Small and nondominant vessel with 99% subtotal mid vessel  occlusion with KIRK-1 flow in the vessel. 2.  Normal left ventricular size with apical hypokinesis with an  estimated ejection fraction of 45%-50%. 3.  Successful balloon angioplasty with deployment of drug-eluting  coronary stent to the proximal LAD with very good results. A/P:   ASSESSMENT / PLAN:    Maximilian Ochoa was seen today for coronary artery disease and hypertension. Diagnoses and all orders for this visit:    Coronary artery disease involving native coronary artery of native heart without angina pectoris - NSTEMI, s/p 2.75 mm x 18 mm Medtronic [place] Resolute HO to Proximal LAD on 5/5/2021 (Also had 70% ostial/proximal stenosis of a moderate size diagonal branch, 40% proximal circumflex, 70% stenosis of the first marginal branch, 50% proximal left posterolateral branch and 70% ostial stenosis of the left posterior descending artery, 99% subtotal small, nondominant mid RCA; EF 45-50%) - On Brilinta, BB, Statin - No Aspirin due to Allergy. Change Brilnta to Plavix since she will be on Eliquis.         -     EKG 12 Lead     Paroxysmal A fib - Dx 5/2/2022 - High [id number] VASc score - Started on Eliquis    Essential hypertension - Controlled  -     EKG 12 Lead      Hx of Pericardial effusion - Small by Echo in 2017 - Resolved on Echo 5/2022     Non-rheumatic Tricuspid regurgitation - Mild, last Echo 2/2017     Aspirin allergy - \"Lip swelling\"     Edema of both feet - Chronic - Elevate feet, decrease salt intake     Non morbid obesity - Diet, exercise and weight loss discussed.     Hx of Chronic kidney disease, unspecified CKD stage - Normal Creatinine in May 2021     Hx of COVID -19 12/2021 - received Casirivimab/Imdevimab infusion. Preventive Cardiology: Low cholesterol diet, regular exercise as tolerate, and gradual weight loss discussed. Echo findings and marc recommendations discussed. The patient's current medication list, allergies, problem list and results of prior tests (as available) were reviewed at today's visit   All questions answered about cardiac diagnoses and cardiac medications. Continue current medications. Monitor BP and heart rates. Compliance with medications and f/u with all physicians discussed. Risk factor modification based on risk profile discussed. Call if any exertional chest pain, short of breath, dizzy or palpitations. Follow up in 6 months or earlier if needed. Addendum:    I called Donell Alanis at home and discussed about her A fib and I recommend Anticoagulation due to elevated stroke risk. Risk benefits and alternatives discussed; She is agreeable to take Eliquis; Change her Brilinta to Plavix due to high bleeding risk. She verbalized understanding bleeding risk from Eliquis and discontinuing her Brilinta once she start taking Eliquis and Plavix.       Will see her in 4-6 weeks    Electronically signed by Delaney Watt MD on [facility]  [facility], [place], [address] [facility]  [phone]

## 2022-05-04 RX ORDER — CLOPIDOGREL BISULFATE 75 MG/1
75 TABLET ORAL DAILY
Qty: 90 TABLET | Refills: 3 | Status: SHIPPED | OUTPATIENT
Start: 2022-05-04

## 2022-07-11 ENCOUNTER — OFFICE VISIT (OUTPATIENT)
Dept: CARDIOLOGY CLINIC | Age: 77
End: 2022-07-11
Payer: MEDICARE

## 2022-07-11 VITALS
WEIGHT: 206.2 LBS | DIASTOLIC BLOOD PRESSURE: 62 MMHG | SYSTOLIC BLOOD PRESSURE: 130 MMHG | HEIGHT: 64 IN | BODY MASS INDEX: 35.2 KG/M2 | HEART RATE: 75 BPM | RESPIRATION RATE: 16 BRPM

## 2022-07-11 DIAGNOSIS — I10 ESSENTIAL HYPERTENSION: ICD-10-CM

## 2022-07-11 DIAGNOSIS — Z86.16 HISTORY OF COVID-19: ICD-10-CM

## 2022-07-11 DIAGNOSIS — R60.0 EDEMA OF BOTH FEET: ICD-10-CM

## 2022-07-11 DIAGNOSIS — I48.0 PAROXYSMAL ATRIAL FIBRILLATION (HCC): ICD-10-CM

## 2022-07-11 DIAGNOSIS — I25.10 CORONARY ARTERY DISEASE, UNSPECIFIED VESSEL OR LESION TYPE, UNSPECIFIED WHETHER ANGINA PRESENT, UNSPECIFIED WHETHER NATIVE OR TRANSPLANTED HEART: Primary | ICD-10-CM

## 2022-07-11 DIAGNOSIS — Z88.8 ASPIRIN ALLERGY: ICD-10-CM

## 2022-07-11 DIAGNOSIS — I34.0 NON-RHEUMATIC MITRAL REGURGITATION: ICD-10-CM

## 2022-07-11 PROCEDURE — 1123F ACP DISCUSS/DSCN MKR DOCD: CPT | Performed by: INTERNAL MEDICINE

## 2022-07-11 PROCEDURE — G8417 CALC BMI ABV UP PARAM F/U: HCPCS | Performed by: INTERNAL MEDICINE

## 2022-07-11 PROCEDURE — G8400 PT W/DXA NO RESULTS DOC: HCPCS | Performed by: INTERNAL MEDICINE

## 2022-07-11 PROCEDURE — G8427 DOCREV CUR MEDS BY ELIG CLIN: HCPCS | Performed by: INTERNAL MEDICINE

## 2022-07-11 PROCEDURE — 93000 ELECTROCARDIOGRAM COMPLETE: CPT | Performed by: INTERNAL MEDICINE

## 2022-07-11 PROCEDURE — 99214 OFFICE O/P EST MOD 30 MIN: CPT | Performed by: INTERNAL MEDICINE

## 2022-07-11 PROCEDURE — 1036F TOBACCO NON-USER: CPT | Performed by: INTERNAL MEDICINE

## 2022-07-11 PROCEDURE — 1090F PRES/ABSN URINE INCON ASSESS: CPT | Performed by: INTERNAL MEDICINE

## 2022-07-11 RX ORDER — HYDROCHLOROTHIAZIDE 25 MG/1
25 TABLET ORAL DAILY
COMMUNITY
Start: 2022-05-31

## 2022-07-11 NOTE — PROGRESS NOTES
OFFICE VISIT     PRIMARY CARE PHYSICIAN:      Nicole Hernández DO       ALLERGIES / SENSITIVITIES:        Allergies   Allergen Reactions    Latex     Aspirin     Andrés-Allergin [Diphenhydramine]     Darvocet A500 [Propoxyphene N-Acetaminophen]     Fish-Derived Products     Honey Bee Venom [Bee Venom]     Lemon Oil     Niacin And Related     Nuts [Peanut Oil]     Penicillins     Shellfish-Derived Products     Wine [Alcohol]      Beer, wine and diet pop          REVIEWED MEDICATIONS:        Current Outpatient Medications:     hydroCHLOROthiazide (HYDRODIURIL) 25 MG tablet, Take 25 mg by mouth daily, Disp: , Rfl:     clopidogrel (PLAVIX) 75 MG tablet, Take 1 tablet by mouth daily, Disp: 90 tablet, Rfl: 3    apixaban (ELIQUIS) 5 MG TABS tablet, Take 1 tablet by mouth 2 times daily, Disp: 180 tablet, Rfl: 3    guaiFENesin 400 MG tablet, TAKE 1 TABLET BY MOUTH TWICE DAILY, Disp: , Rfl:     carvedilol (COREG) 12.5 MG tablet, Take 12.5 mg by mouth 2 times daily (with meals), Disp: , Rfl:     metoprolol succinate (TOPROL XL) 50 MG extended release tablet, Take 1 tablet by mouth daily, Disp: 30 tablet, Rfl: 3    isosorbide mononitrate (IMDUR) 30 MG extended release tablet, Take 1 tablet by mouth daily, Disp: 30 tablet, Rfl: 3    atorvastatin (LIPITOR) 40 MG tablet, Take 1 tablet by mouth nightly, Disp: 30 tablet, Rfl: 3    vitamin D (CHOLECALCIFEROL) 25 MCG (1000 UT) TABS tablet, Take 1,000 Units by mouth daily, Disp: , Rfl:     fluticasone (FLONASE) 50 MCG/ACT nasal spray, 1 spray by Each Nostril route 2 times daily, Disp: , Rfl:     amLODIPine (NORVASC) 10 MG tablet, Take 10 mg by mouth daily, Disp: , Rfl:       S: REASON FOR VISIT:       Chief Complaint   Patient presents with    Coronary Artery Disease     2 mo OV. Patient complains of swelling in feet and ankles. More pain in her left leg.           History of Present Illness:       Office Visit for follow up of CAD, HTN, VHD              70 yr old Abelino Blount with history of HTN, VHD, CAD s/p LAD stent in May 2021 came for f/u visit   C/o edema feet L>R, chronic   No hospitalizations or surgeries since last visit    Had COVID in 12/2021  - Given Casirivimab/Imdevimab infusion. Did not get vaccine              Rob any exertional chest pain               Mild SOBOE, chronic. Sleep on on pillow   Patient is compliant with all medications   Rob any exertional chest pain or short of breath   No palpitations, dizzy or syncope. Active at home   Try to watch diet          Past Medical History:   Diagnosis Date    Hyperlipidemia     Hypertension     Thyroid disease             Past Surgical History:   Procedure Laterality Date    CARPAL TUNNEL RELEASE      CATARACT REMOVAL      CHOLECYSTECTOMY      CORONARY ANGIOPLASTY WITH STENT PLACEMENT  05/07/2021    Dr Gretel Mittal LAD    FRACTURE SURGERY      HYSTERECTOMY (CERVIX STATUS UNKNOWN)      SKIN CANCER EXCISION      rt arm        History reviewed. No pertinent family history.        Social History     Tobacco Use    Smoking status: Never Smoker    Smokeless tobacco: Never Used   Substance Use Topics    Alcohol use: No     Comment: drinks 2 cups of coffee daily         Review of Systems:    Constitutional: negative for fever and chills, or significant weight loss  HEENT: negative for acute visual symptoms or auditory problems, no dysphagia  Respiratory: negative for cough, wheezing, or hemoptysis  Cardiovascular: negative for chest pain, palpitations, and +ve dyspnea  Gastrointestinal: negative for abdominal pain, diarrhea, melena, nausea and vomiting  Endocrine: Negative for polyuria and polydyspsia  Genitourinary: negative for dysuria and hematuria  Derm: negative for rash and skin lesion(s)  Neurological: negative for tingling, numbness, weakness, seizures  Endocrine: negative for polydipsia and polyuria  Musculoskeletal: negative for pain or tenderness  Psychiatric: negative for anxiety, depression, or suicidal ideations         O:  COMPLETE PHYSICAL EXAM:       /62   Pulse 75   Resp 16   Ht 5' 4\" (1.626 m)   Wt 206 lb 3.2 oz (93.5 kg)   BMI 35.39 kg/m²       General:   Patient alert, comfortable, no distress. Appears stated age. HEENT:    Pupils equal, no icterus; Tongue moist.   Neck:              No masses, Thyroid not palpable. No elevated JVD, No carotid bruit. Chest:   Normal configuration, non tender. Lungs:   Clear to auscultation bilaterally except few scattered rhonchi. Cardiovascular:  Regular rhythm, 2/6 systolic murmur, No S3, no palpable thrills. Abdomen:  Soft, Bowel sounds normal, obese, no pulsatile abdominal aorta, no palpable masses. Extremities:  + edema. Distal pulses palpable. No cyanosis, no clubbing. Skin:   Good turgor, warm and dry, no cyanosis. Musculoskeletal: No joint swelling or deformity. Neuro:   Cranial nerves grossly intact; No focal neurologic deficit. Psych:   Alert, good mood and effect. REVIEW OF DIAGNOSTIC TESTS:        Electrocardiogram: Reviewed      Echo 5/2/2022  Summary   Normal left ventricular chamber size.   Normal left ventricular systolic function, LVEF is 64%.   Stage III diastolic dysfunction.   Left atrium is enlarged.   Mildly increased left atrial volume.   Interatrial septum not well visualized but appears intact.   Mild right ventricular enlargement with normal function, TAPSE 1.8cm.   There is mild mitral regurgitation.   No mitral valve prolapse.   No hemodynamically significant aortic stenosis.   There is mild tricuspid regurgitation, RVSP 33mmHg.   Normal aortic root size.   No hemodynamically significant pericardial effusion.   Epicardial fat.   No intra cardiac mass or thrombus.   Prior echo from 2/2017 also showed small pericardial effusion and mild Pulmonary HTN.    Left Heart Cath 5/5/2021  CONCLUSIONS:  1.  Coronary artery.   a.  Left main.  Heavily calcified vessel, which is probably diffusely  diseased, but with no apparent angiographic luminal narrowings and with  no dampening of pressure with the diagnostic over the guide catheter. b. Antoniette Jian vessel with 95% thrombotic subtotal proximal vessel  occlusion and 50% mid vessel disease with KIRK-2 flow in the vessel. 70% ostial/proximal stenosis of a moderate size diagonal branch.  c.  LCX.  Large dominant vessel, which is heavily calcified in its  proximal segment with 40% proximal vessel narrowing, 70% stenosis of the  first marginal branch, 50% proximal stenosis of the left posterolateral  branch and 70% ostial stenosis of the left posterior descending artery  branch. d. Roselinda Glez.  Small and nondominant vessel with 99% subtotal mid vessel  occlusion with KIRK-1 flow in the vessel. 2.  Normal left ventricular size with apical hypokinesis with an  estimated ejection fraction of 45%-50%. 3.  Successful balloon angioplasty with deployment of drug-eluting  coronary stent to the proximal LAD with very good results. ASSESSMENT / PLAN:    Radha Li was seen today for coronary artery disease, A fib    Diagnoses and all orders for this visit:       Coronary artery disease involving native coronary artery of native heart without angina pectoris - NSTEMI, s/p 2.75 mm x 18 mm Medtronic [place] Resolute HO to Proximal LAD on 5/5/2021 (Also had 70% ostial/proximal stenosis of a moderate size diagonal branch, 40% proximal circumflex, 70% stenosis of the first marginal branch, 50% proximal left posterolateral branch and 70% ostial stenosis of the left posterior descending artery, 99% subtotal small, nondominant mid RCA; EF 45-50%) - Continue Plavix, BB, Statin - No Aspirin due to Allergy an don Eliquis.          -     EKG 12 Lead     Paroxysmal A fib - Dx 5/2/2022 - High [id number] VASc score - On Eliquis for [facility]     Essential hypertension - Controlled  -     EKG 12 Lead      Hx of Pericardial effusion - Small by Echo in 2017 - Resolved on Echo 5/2022     Non-rheumatic Mitral regurgitation - Mild    Non-rheumatic Tricuspid regurgitation - Mild     Aspirin allergy - \"Lip swelling\"     Edema of both feet - Chronic - Elevate feet, decrease salt intake - If worse, decrease Amlodipine and add Lasix 20mg     Non morbid obesity - Diet, exercise and weight loss discussed.     Hx of Chronic kidney disease, unspecified CKD stage - Creatinine 1.4 on 3/26/2022     Hx of COVID -19 12/2021 - received Casirivimab/Imdevimab infusion.     Preventive Cardiology: Low cholesterol diet, regular exercise as tolerate, and gradual weight loss discussed.        Above recommendations discussed. The patient's current medication list, allergies, problem list and results of prior tests (as available) were reviewed at today's visit   All questions answered about cardiac diagnoses and cardiac medications. Continue current medications. Monitor BP and heart rates. Compliance with medications and f/u with all physicians discussed. Risk factor modification based on risk profile discussed. Call if any exertional chest pain, short of breath, dizzy or palpitations. Follow up in 6 months or earlier if needed.          [facility]  [address], [place], [address] [place] [address]  [phone]

## 2022-10-09 PROCEDURE — 99285 EMERGENCY DEPT VISIT HI MDM: CPT

## 2022-10-10 ENCOUNTER — APPOINTMENT (OUTPATIENT)
Dept: CT IMAGING | Age: 77
DRG: 184 | End: 2022-10-10
Payer: MEDICARE

## 2022-10-10 ENCOUNTER — APPOINTMENT (OUTPATIENT)
Dept: ULTRASOUND IMAGING | Age: 77
DRG: 184 | End: 2022-10-10
Payer: MEDICARE

## 2022-10-10 ENCOUNTER — APPOINTMENT (OUTPATIENT)
Dept: GENERAL RADIOLOGY | Age: 77
DRG: 184 | End: 2022-10-10
Payer: MEDICARE

## 2022-10-10 ENCOUNTER — HOSPITAL ENCOUNTER (INPATIENT)
Age: 77
LOS: 3 days | Discharge: HOME | DRG: 184 | End: 2022-10-13
Attending: EMERGENCY MEDICINE | Admitting: SURGERY
Payer: MEDICARE

## 2022-10-10 DIAGNOSIS — S22.42XA CLOSED FRACTURE OF MULTIPLE RIBS OF LEFT SIDE, INITIAL ENCOUNTER: ICD-10-CM
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
## 2022-10-10 PROBLEM — I82.442 ACUTE DEEP VEIN THROMBOSIS (DVT) OF TIBIAL VEIN OF LEFT LOWER EXTREMITY (HCC): Status: ACTIVE | Noted: 2022-10-10

## 2022-10-10 PROBLEM — I25.119 CORONARY ARTERY DISEASE INVOLVING NATIVE CORONARY ARTERY OF NATIVE HEART WITH ANGINA PECTORIS (HCC): Status: ACTIVE | Noted: 2022-10-10

## 2022-10-10 PROBLEM — Z79.01 ANTICOAGULATION MANAGEMENT ENCOUNTER: Status: ACTIVE | Noted: 2022-10-10

## 2022-10-10 PROBLEM — Z51.81 ANTICOAGULATION MANAGEMENT ENCOUNTER: Status: ACTIVE | Noted: 2022-10-10

## 2022-10-10 PROBLEM — I48.11 LONGSTANDING PERSISTENT ATRIAL FIBRILLATION (HCC): Status: ACTIVE | Noted: 2022-10-10

## 2022-10-10 PROBLEM — T14.90XA TRAUMA: Status: ACTIVE | Noted: 2022-10-10

## 2022-10-10 LAB
ALBUMIN SERPL-MCNC: 3.9 G/DL (ref 3.5–5.2)
ALP BLD-CCNC: 71 U/L (ref 35–104)
ALT SERPL-CCNC: 9 U/L (ref 0–32)
AMPHETAMINE SCREEN, URINE: NOT DETECTED
ANION GAP SERPL CALCULATED.3IONS-SCNC: 9 MMOL/L (ref 7–16)
AST SERPL-CCNC: 10 U/L (ref 0–31)
BARBITURATE SCREEN URINE: NOT DETECTED
BASOPHILS ABSOLUTE: 0.03 E9/L (ref 0–0.2)
BASOPHILS RELATIVE PERCENT: 0.3 % (ref 0–2)
BENZODIAZEPINE SCREEN, URINE: NOT DETECTED
BILIRUB SERPL-MCNC: 0.8 MG/DL (ref 0–1.2)
BUN BLDV-MCNC: 17 MG/DL (ref 6–23)
CALCIUM SERPL-MCNC: 9.1 MG/DL (ref 8.6–10.2)
CANNABINOID SCREEN URINE: NOT DETECTED
CHLORIDE BLD-SCNC: 106 MMOL/L (ref 98–107)
CO2: 26 MMOL/L (ref 22–29)
COCAINE METABOLITE SCREEN URINE: NOT DETECTED
CREAT SERPL-MCNC: 1.4 MG/DL (ref 0.5–1)
EKG ATRIAL RATE: 90 BPM
EKG Q-T INTERVAL: 402 MS
EKG QRS DURATION: 92 MS
EKG QTC CALCULATION (BAZETT): 458 MS
EKG R AXIS: 38 DEGREES
EKG T AXIS: -39 DEGREES
EKG VENTRICULAR RATE: 78 BPM
EOSINOPHILS ABSOLUTE: 0.21 E9/L (ref 0.05–0.5)
EOSINOPHILS RELATIVE PERCENT: 2.2 % (ref 0–6)
FENTANYL SCREEN, URINE: NOT DETECTED
GFR AFRICAN AMERICAN: 44
GFR NON-AFRICAN AMERICAN: 36 ML/MIN/1.73
GLUCOSE BLD-MCNC: 134 MG/DL (ref 74–99)
HCT VFR BLD CALC: 33.6 % (ref 34–48)
HEMOGLOBIN: 10.8 G/DL (ref 11.5–15.5)
IMMATURE GRANULOCYTES #: 0.03 E9/L
IMMATURE GRANULOCYTES %: 0.3 % (ref 0–5)
LYMPHOCYTES ABSOLUTE: 1.52 E9/L (ref 1.5–4)
LYMPHOCYTES RELATIVE PERCENT: 15.9 % (ref 20–42)
Lab: ABNORMAL
MCH RBC QN AUTO: 28.9 PG (ref 26–35)
MCHC RBC AUTO-ENTMCNC: 32.1 % (ref 32–34.5)
MCV RBC AUTO: 89.8 FL (ref 80–99.9)
METHADONE SCREEN, URINE: NOT DETECTED
MONOCYTES ABSOLUTE: 0.76 E9/L (ref 0.1–0.95)
MONOCYTES RELATIVE PERCENT: 7.9 % (ref 2–12)
NEUTROPHILS ABSOLUTE: 7.01 E9/L (ref 1.8–7.3)
NEUTROPHILS RELATIVE PERCENT: 73.4 % (ref 43–80)
OPIATE SCREEN URINE: NOT DETECTED
OXYCODONE URINE: POSITIVE
PDW BLD-RTO: 14.8 FL (ref 11.5–15)
PHENCYCLIDINE SCREEN URINE: NOT DETECTED
PLATELET # BLD: 174 E9/L (ref 130–450)
PMV BLD AUTO: 9.5 FL (ref 7–12)
POTASSIUM SERPL-SCNC: 4.3 MMOL/L (ref 3.5–5)
PRO-BNP: 2811 PG/ML (ref 0–450)
RBC # BLD: 3.74 E12/L (ref 3.5–5.5)
SODIUM BLD-SCNC: 141 MMOL/L (ref 132–146)
TOTAL CK: 51 U/L (ref 20–180)
TOTAL PROTEIN: 6.8 G/DL (ref 6.4–8.3)
TROPONIN, HIGH SENSITIVITY: 13 NG/L (ref 0–9)
TROPONIN, HIGH SENSITIVITY: 13 NG/L (ref 0–9)
WBC # BLD: 9.6 E9/L (ref 4.5–11.5)

## 2022-10-10 PROCEDURE — 93005 ELECTROCARDIOGRAM TRACING: CPT | Performed by: PHYSICIAN ASSISTANT

## 2022-10-10 PROCEDURE — 71101 X-RAY EXAM UNILAT RIBS/CHEST: CPT

## 2022-10-10 PROCEDURE — 6370000000 HC RX 637 (ALT 250 FOR IP): Performed by: STUDENT IN AN ORGANIZED HEALTH CARE EDUCATION/TRAINING PROGRAM

## 2022-10-10 PROCEDURE — 99223 1ST HOSP IP/OBS HIGH 75: CPT | Performed by: SURGERY

## 2022-10-10 PROCEDURE — 90714 TD VACC NO PRESV 7 YRS+ IM: CPT | Performed by: STUDENT IN AN ORGANIZED HEALTH CARE EDUCATION/TRAINING PROGRAM

## 2022-10-10 PROCEDURE — 6370000000 HC RX 637 (ALT 250 FOR IP): Performed by: CLINICAL NURSE SPECIALIST

## 2022-10-10 PROCEDURE — 73110 X-RAY EXAM OF WRIST: CPT

## 2022-10-10 PROCEDURE — 73590 X-RAY EXAM OF LOWER LEG: CPT

## 2022-10-10 PROCEDURE — 6360000002 HC RX W HCPCS: Performed by: STUDENT IN AN ORGANIZED HEALTH CARE EDUCATION/TRAINING PROGRAM

## 2022-10-10 PROCEDURE — 6370000000 HC RX 637 (ALT 250 FOR IP)

## 2022-10-10 PROCEDURE — 99223 1ST HOSP IP/OBS HIGH 75: CPT | Performed by: INTERNAL MEDICINE

## 2022-10-10 PROCEDURE — G0378 HOSPITAL OBSERVATION PER HR: HCPCS

## 2022-10-10 PROCEDURE — 93970 EXTREMITY STUDY: CPT

## 2022-10-10 PROCEDURE — 73560 X-RAY EXAM OF KNEE 1 OR 2: CPT

## 2022-10-10 PROCEDURE — 80053 COMPREHEN METABOLIC PANEL: CPT

## 2022-10-10 PROCEDURE — 72170 X-RAY EXAM OF PELVIS: CPT

## 2022-10-10 PROCEDURE — 73610 X-RAY EXAM OF ANKLE: CPT

## 2022-10-10 PROCEDURE — 82550 ASSAY OF CK (CPK): CPT

## 2022-10-10 PROCEDURE — 51701 INSERT BLADDER CATHETER: CPT

## 2022-10-10 PROCEDURE — 80307 DRUG TEST PRSMV CHEM ANLYZR: CPT

## 2022-10-10 PROCEDURE — 51798 US URINE CAPACITY MEASURE: CPT

## 2022-10-10 PROCEDURE — 70450 CT HEAD/BRAIN W/O DYE: CPT

## 2022-10-10 PROCEDURE — 90471 IMMUNIZATION ADMIN: CPT | Performed by: STUDENT IN AN ORGANIZED HEALTH CARE EDUCATION/TRAINING PROGRAM

## 2022-10-10 PROCEDURE — 73562 X-RAY EXAM OF KNEE 3: CPT

## 2022-10-10 PROCEDURE — 83880 ASSAY OF NATRIURETIC PEPTIDE: CPT

## 2022-10-10 PROCEDURE — 85025 COMPLETE CBC W/AUTO DIFF WBC: CPT

## 2022-10-10 PROCEDURE — 71250 CT THORAX DX C-: CPT

## 2022-10-10 PROCEDURE — 72125 CT NECK SPINE W/O DYE: CPT

## 2022-10-10 PROCEDURE — 84484 ASSAY OF TROPONIN QUANT: CPT

## 2022-10-10 RX ORDER — SODIUM CHLORIDE 0.9 % (FLUSH) 0.9 %
10 SYRINGE (ML) INJECTION EVERY 12 HOURS SCHEDULED
Status: DISCONTINUED | OUTPATIENT
Start: 2022-10-10 | End: 2022-10-10

## 2022-10-10 RX ORDER — SODIUM CHLORIDE 9 MG/ML
INJECTION, SOLUTION INTRAVENOUS PRN
Status: DISCONTINUED | OUTPATIENT
Start: 2022-10-10 | End: 2022-10-10

## 2022-10-10 RX ORDER — TETANUS AND DIPHTHERIA TOXOIDS ADSORBED 2; 2 [LF]/.5ML; [LF]/.5ML
0.5 INJECTION INTRAMUSCULAR ONCE
Status: COMPLETED | OUTPATIENT
Start: 2022-10-10 | End: 2022-10-10

## 2022-10-10 RX ORDER — LIDOCAINE 4 G/G
1 PATCH TOPICAL DAILY
Status: DISCONTINUED | OUTPATIENT
Start: 2022-10-10 | End: 2022-10-13 | Stop reason: HOSPADM

## 2022-10-10 RX ORDER — SODIUM CHLORIDE 9 MG/ML
INJECTION, SOLUTION INTRAVENOUS PRN
Status: DISCONTINUED | OUTPATIENT
Start: 2022-10-10 | End: 2022-10-13 | Stop reason: HOSPADM

## 2022-10-10 RX ORDER — SODIUM CHLORIDE 0.9 % (FLUSH) 0.9 %
10 SYRINGE (ML) INJECTION PRN
Status: DISCONTINUED | OUTPATIENT
Start: 2022-10-10 | End: 2022-10-13 | Stop reason: HOSPADM

## 2022-10-10 RX ORDER — SODIUM CHLORIDE 0.9 % (FLUSH) 0.9 %
5-40 SYRINGE (ML) INJECTION PRN
Status: DISCONTINUED | OUTPATIENT
Start: 2022-10-10 | End: 2022-10-13 | Stop reason: HOSPADM

## 2022-10-10 RX ORDER — ATORVASTATIN CALCIUM 40 MG/1
40 TABLET, FILM COATED ORAL NIGHTLY
Status: DISCONTINUED | OUTPATIENT
Start: 2022-10-10 | End: 2022-10-13 | Stop reason: HOSPADM

## 2022-10-10 RX ORDER — OXYCODONE HYDROCHLORIDE 5 MG/1
2.5 TABLET ORAL EVERY 4 HOURS PRN
Status: DISCONTINUED | OUTPATIENT
Start: 2022-10-10 | End: 2022-10-13 | Stop reason: HOSPADM

## 2022-10-10 RX ORDER — CLOPIDOGREL BISULFATE 75 MG/1
75 TABLET ORAL DAILY
Status: DISCONTINUED | OUTPATIENT
Start: 2022-10-10 | End: 2022-10-13 | Stop reason: HOSPADM

## 2022-10-10 RX ORDER — BISACODYL 10 MG
10 SUPPOSITORY, RECTAL RECTAL DAILY PRN
Status: DISCONTINUED | OUTPATIENT
Start: 2022-10-10 | End: 2022-10-13 | Stop reason: HOSPADM

## 2022-10-10 RX ORDER — SODIUM CHLORIDE 0.9 % (FLUSH) 0.9 %
5-40 SYRINGE (ML) INJECTION EVERY 12 HOURS SCHEDULED
Status: DISCONTINUED | OUTPATIENT
Start: 2022-10-10 | End: 2022-10-10

## 2022-10-10 RX ORDER — SENNA AND DOCUSATE SODIUM 50; 8.6 MG/1; MG/1
1 TABLET, FILM COATED ORAL 2 TIMES DAILY
Status: DISCONTINUED | OUTPATIENT
Start: 2022-10-10 | End: 2022-10-13 | Stop reason: HOSPADM

## 2022-10-10 RX ORDER — POLYETHYLENE GLYCOL 3350 17 G/17G
17 POWDER, FOR SOLUTION ORAL DAILY
Status: DISCONTINUED | OUTPATIENT
Start: 2022-10-10 | End: 2022-10-13 | Stop reason: HOSPADM

## 2022-10-10 RX ORDER — OXYCODONE HYDROCHLORIDE 5 MG/1
5 TABLET ORAL ONCE
Status: COMPLETED | OUTPATIENT
Start: 2022-10-10 | End: 2022-10-10

## 2022-10-10 RX ORDER — ONDANSETRON 2 MG/ML
4 INJECTION INTRAMUSCULAR; INTRAVENOUS EVERY 6 HOURS PRN
Status: DISCONTINUED | OUTPATIENT
Start: 2022-10-10 | End: 2022-10-13 | Stop reason: HOSPADM

## 2022-10-10 RX ORDER — METHOCARBAMOL 500 MG/1
500 TABLET, FILM COATED ORAL 4 TIMES DAILY
Status: DISCONTINUED | OUTPATIENT
Start: 2022-10-10 | End: 2022-10-13 | Stop reason: HOSPADM

## 2022-10-10 RX ORDER — METOPROLOL SUCCINATE 50 MG/1
50 TABLET, EXTENDED RELEASE ORAL DAILY
Status: DISCONTINUED | OUTPATIENT
Start: 2022-10-10 | End: 2022-10-13 | Stop reason: HOSPADM

## 2022-10-10 RX ORDER — HYDROCHLOROTHIAZIDE 25 MG/1
25 TABLET ORAL DAILY
Status: DISCONTINUED | OUTPATIENT
Start: 2022-10-10 | End: 2022-10-13 | Stop reason: HOSPADM

## 2022-10-10 RX ORDER — POLYETHYLENE GLYCOL 3350 17 G/17G
17 POWDER, FOR SOLUTION ORAL DAILY PRN
Status: DISCONTINUED | OUTPATIENT
Start: 2022-10-10 | End: 2022-10-10

## 2022-10-10 RX ORDER — ISOSORBIDE MONONITRATE 30 MG/1
30 TABLET, EXTENDED RELEASE ORAL DAILY
Status: DISCONTINUED | OUTPATIENT
Start: 2022-10-10 | End: 2022-10-13 | Stop reason: HOSPADM

## 2022-10-10 RX ORDER — ACETAMINOPHEN 325 MG/1
650 TABLET ORAL EVERY 6 HOURS
Status: DISCONTINUED | OUTPATIENT
Start: 2022-10-10 | End: 2022-10-13 | Stop reason: HOSPADM

## 2022-10-10 RX ORDER — ONDANSETRON 4 MG/1
4 TABLET, ORALLY DISINTEGRATING ORAL EVERY 8 HOURS PRN
Status: DISCONTINUED | OUTPATIENT
Start: 2022-10-10 | End: 2022-10-13 | Stop reason: HOSPADM

## 2022-10-10 RX ORDER — BISACODYL 10 MG
10 SUPPOSITORY, RECTAL RECTAL DAILY
Status: DISCONTINUED | OUTPATIENT
Start: 2022-10-10 | End: 2022-10-10

## 2022-10-10 RX ADMIN — SENNOSIDES AND DOCUSATE SODIUM 1 TABLET: 50; 8.6 TABLET ORAL at 22:04

## 2022-10-10 RX ADMIN — CLOPIDOGREL BISULFATE 75 MG: 75 TABLET ORAL at 14:52

## 2022-10-10 RX ADMIN — HYDROCHLOROTHIAZIDE 25 MG: 25 TABLET ORAL at 14:52

## 2022-10-10 RX ADMIN — METHOCARBAMOL TABLETS 500 MG: 500 TABLET, COATED ORAL at 14:52

## 2022-10-10 RX ADMIN — ISOSORBIDE MONONITRATE 30 MG: 30 TABLET, EXTENDED RELEASE ORAL at 14:52

## 2022-10-10 RX ADMIN — ATORVASTATIN CALCIUM 40 MG: 40 TABLET, FILM COATED ORAL at 22:04

## 2022-10-10 RX ADMIN — METOPROLOL SUCCINATE 50 MG: 50 TABLET, EXTENDED RELEASE ORAL at 14:51

## 2022-10-10 RX ADMIN — APIXABAN 5 MG: 5 TABLET, FILM COATED ORAL at 22:04

## 2022-10-10 RX ADMIN — SENNOSIDES AND DOCUSATE SODIUM 1 TABLET: 50; 8.6 TABLET ORAL at 14:52

## 2022-10-10 RX ADMIN — POLYETHYLENE GLYCOL 3350 17 G: 17 POWDER, FOR SOLUTION ORAL at 14:51

## 2022-10-10 RX ADMIN — TETANUS AND DIPHTHERIA TOXOIDS ADSORBED 0.5 ML: 2; 2 INJECTION INTRAMUSCULAR at 01:42

## 2022-10-10 RX ADMIN — METHOCARBAMOL TABLETS 500 MG: 500 TABLET, COATED ORAL at 08:31

## 2022-10-10 RX ADMIN — OXYCODONE HYDROCHLORIDE 5 MG: 5 TABLET ORAL at 03:47

## 2022-10-10 RX ADMIN — APIXABAN 5 MG: 5 TABLET, FILM COATED ORAL at 14:52

## 2022-10-10 RX ADMIN — ACETAMINOPHEN 650 MG: 325 TABLET, FILM COATED ORAL at 22:04

## 2022-10-10 RX ADMIN — METHOCARBAMOL TABLETS 500 MG: 500 TABLET, COATED ORAL at 22:04

## 2022-10-10 RX ADMIN — ACETAMINOPHEN 650 MG: 325 TABLET, FILM COATED ORAL at 14:51

## 2022-10-10 ASSESSMENT — PAIN DESCRIPTION - ORIENTATION: ORIENTATION: LEFT

## 2022-10-10 ASSESSMENT — ENCOUNTER SYMPTOMS
VOMITING: 0
SHORTNESS OF BREATH: 0
COUGH: 0
DIARRHEA: 0
ABDOMINAL PAIN: 0
COLOR CHANGE: 0
NAUSEA: 0
BACK PAIN: 0

## 2022-10-10 ASSESSMENT — PAIN SCALES - GENERAL
PAINLEVEL_OUTOF10: 7
PAINLEVEL_OUTOF10: 0
PAINLEVEL_OUTOF10: 5

## 2022-10-10 ASSESSMENT — PAIN DESCRIPTION - LOCATION
LOCATION: RIB CAGE
LOCATION: LEG
LOCATION: LEG

## 2022-10-10 ASSESSMENT — PAIN - FUNCTIONAL ASSESSMENT: PAIN_FUNCTIONAL_ASSESSMENT: 0-10

## 2022-10-10 NOTE — ED NOTES
Pt states she has some minor pain from fall, HCP notified, new orderes in place, Pt medicated, will re evaluate pain with in 1 hour     Prasanth Onofre, [address] [facility]  10/10/22 [id number]

## 2022-10-10 NOTE — ED PROVIDER NOTES
HPI   70-year-old female patient presents emergency department for evaluation of fall. Patient reporting she was at [facility] today when her left leg Giving out. She denies any chest pain, shortness of breath, nausea, vomiting, diarrhea. Denies any head injury. She is complaining of left wrist pain, left rib pain and bilateral knee pain. She notes she has been having bilateral hand left lower extremity calf swelling, today after the fall she states that the left calf appears to have swollen more. On chart review patient had an echocardiogram performed on May 2022 showing EF of 64%. Patient on Eliquis she is compliant with medications. Review of Systems   Constitutional:  Negative for chills and fever. HENT:  Negative for congestion. Respiratory:  Negative for cough and shortness of breath. Cardiovascular:  Positive for leg swelling. Negative for chest pain. Gastrointestinal:  Negative for abdominal pain, diarrhea, nausea and vomiting. Genitourinary:  Negative for difficulty urinating, dysuria and hematuria. Musculoskeletal:  Negative for back pain. Right knee pain, left wrist pain, left wrist abrasion, left rib pain   Skin:  Negative for color change. All other systems reviewed and are negative. Physical Exam  Vitals and nursing note reviewed. Constitutional:       Appearance: Normal appearance. HENT:      Head: Normocephalic and atraumatic. Nose: Nose normal. No congestion. Mouth/Throat:      Mouth: Mucous membranes are moist.      Pharynx: Oropharynx is clear. Eyes:      Conjunctiva/sclera: Conjunctivae normal.      Pupils: Pupils are equal, round, and reactive to light. Cardiovascular:      Rate and Rhythm: Normal rate and regular rhythm. Pulses: Normal pulses. Dorsalis pedis pulses are 2+ on the right side and 2+ on the left side. Heart sounds: Normal heart sounds.    Pulmonary:      Effort: Pulmonary effort is normal. No respiratory distress. Breath sounds: Normal breath sounds. Abdominal:      General: Bowel sounds are normal. There is no distension. Tenderness: There is no abdominal tenderness. Musculoskeletal:         General: Normal range of motion. Cervical back: Normal range of motion and neck supple. Comments: Tenderness to palpation of the left-sided chest wall, no crepitus, no flail chest.  No tenderness to palpation of the cervical spine, thoracic spine, lumbar spine. No step-offs or crepitus. Left calf swollen and tender as compared to right       Skin:     General: Skin is warm and dry. Capillary Refill: Capillary refill takes less than 2 seconds. Comments: Superficial skin tear to the left posterior distal ulna or radius. Mild bruising and swelling to the right patella. Neurological:      General: No focal deficit present. Mental Status: She is alert. Procedures     MDM  Patient presenting for evaluation status post fall. Patient here in no acute distress. Initial labs and imaging obtained. Patient found to have multiple rib fractures on the left side. No acute intracranial abnormality. She also has tenderness and swelling of the left calf. Her CK was found to be normal.  She is having pain she was given oxycodone. At this time trauma consult was placed. EKG: This EKG is signed and interpreted by me. Rate: 78  Rhythm: Atrial fibrillation  Interpretation: no acute changes  Comparison: stable as compared to patient's most recent EKG              --------------------------------------------- PAST HISTORY ---------------------------------------------  Past Medical History:  has a past medical history of Hyperlipidemia, Hypertension, and Thyroid disease. Past Surgical History:  has a past surgical history that includes Carpal tunnel release; Cholecystectomy; fracture surgery; Hysterectomy;  Cataract removal; Skin cancer excision; and Coronary angioplasty with stent (05/07/2021). Social History:  reports that she has never smoked. She has never used smokeless tobacco. She reports that she does not drink alcohol and does not use drugs. Family History: family history is not on file. The patients home medications have been reviewed.     Allergies: Latex, Aspirin, Andrés-allergin [diphenhydramine], Darvocet a500 [propoxyphene n-acetaminophen], Fish-derived products, Honey bee venom [bee venom], Lemon oil, Niacin and related, Nuts [peanut oil], Penicillins, Shellfish-derived products, and Wine [alcohol]    -------------------------------------------------- RESULTS -------------------------------------------------    LABS:  Results for orders placed or performed during the hospital encounter of 10/10/22   CBC with Auto Differential   Result Value Ref Range    WBC 9.6 4.5 - 11.5 E9/L    RBC 3.74 3.50 - 5.50 E12/L    Hemoglobin 10.8 (L) 11.5 - 15.5 g/dL    Hematocrit 33.6 (L) 34.0 - 48.0 %    MCV 89.8 80.0 - 99.9 fL    MCH 28.9 26.0 - 35.0 pg    MCHC 32.1 32.0 - 34.5 %    RDW 14.8 11.5 - 15.0 fL    Platelets 339 549 - 612 E9/L    MPV 9.5 7.0 - 12.0 fL    Neutrophils % 73.4 43.0 - 80.0 %    Immature Granulocytes % 0.3 0.0 - 5.0 %    Lymphocytes % 15.9 (L) 20.0 - 42.0 %    Monocytes % 7.9 2.0 - 12.0 %    Eosinophils % 2.2 0.0 - 6.0 %    Basophils % 0.3 0.0 - 2.0 %    Neutrophils Absolute 7.01 1.80 - 7.30 E9/L    Immature Granulocytes # 0.03 E9/L    Lymphocytes Absolute 1.52 1.50 - 4.00 E9/L    Monocytes Absolute 0.76 0.10 - 0.95 E9/L    Eosinophils Absolute 0.21 0.05 - 0.50 E9/L    Basophils Absolute 0.03 0.00 - 0.20 E9/L   Comprehensive Metabolic Panel   Result Value Ref Range    Sodium 141 132 - 146 mmol/L    Potassium 4.3 3.5 - 5.0 mmol/L    Chloride 106 98 - 107 mmol/L    CO2 26 22 - 29 mmol/L    Anion Gap 9 7 - 16 mmol/L    Glucose 134 (H) 74 - 99 mg/dL    BUN 17 6 - 23 mg/dL    Creatinine 1.4 (H) 0.5 - 1.0 mg/dL    GFR Non-African American 36 >=60 mL/min/1.73    GFR African American 44     Calcium 9.1 8.6 - 10.2 mg/dL    Total Protein 6.8 6.4 - 8.3 g/dL    Albumin 3.9 3.5 - 5.2 g/dL    Total Bilirubin 0.8 0.0 - 1.2 mg/dL    Alkaline Phosphatase 71 35 - 104 U/L    ALT 9 0 - 32 U/L    AST 10 0 - 31 U/L   Troponin   Result Value Ref Range    Troponin, High Sensitivity 13 (H) 0 - 9 ng/L   Brain Natriuretic Peptide   Result Value Ref Range    Pro-BNP 2,811 (H) 0 - 450 pg/mL   CK   Result Value Ref Range    Total CK 51 20 - 180 U/L   Troponin   Result Value Ref Range    Troponin, High Sensitivity 13 (H) 0 - 9 ng/L   EKG 12 Lead   Result Value Ref Range    Ventricular Rate 78 BPM    Atrial Rate 90 BPM    QRS Duration 92 ms    Q-T Interval 402 ms    QTc Calculation (Bazett) 458 ms    R Axis 38 degrees    T Axis -39 degrees       RADIOLOGY:  CT HEAD WO CONTRAST   Final Result   No acute intracranial abnormality. CT CERVICAL SPINE WO CONTRAST   Final Result   No acute abnormality of the cervical spine. Multilevel degenerative change with moderate spinal canal stenosis at C5-C6. CT CHEST WO CONTRAST   Final Result   Age-indeterminate fractures of the left 2nd, 4th through 7th ribs anterior   laterally         XR WRIST LEFT (MIN 3 VIEWS)   Final Result   No acute findings. XR TIBIA FIBULA LEFT (2 VIEWS)   Final Result   No acute osseous abnormality. XR RIBS LEFT INCLUDE CHEST (MIN 3 VIEWS)   Final Result   No acute process. XR KNEE RIGHT (1-2 VIEWS)   Final Result   No acute abnormality of the knee. XR KNEE LEFT (1-2 VIEWS)   Final Result   No acute abnormality of the knee. XR ANKLE LEFT (MIN 3 VIEWS)   Final Result   No acute osseous abnormality of the ankle. Lateral ankle edema.          XR PELVIS (1-2 VIEWS)    (Results Pending)   US DUP LOWER EXTREMITIES BILATERAL VENOUS    (Results Pending)     ------------------------- NURSING NOTES AND VITALS REVIEWED ---------------------------  Date / Time Roomed:  10/10/2022 12:09 AM  ED Bed Assignment:  22/22    The nursing notes within the ED encounter and vital signs as below have been reviewed. Patient Vitals for the past 24 hrs:   BP Temp Pulse Resp SpO2   10/10/22 0545 -- -- 80 19 94 %   10/10/22 0530 -- -- 79 19 94 %   10/10/22 0515 -- -- 71 18 94 %   10/10/22 0500 112/80 -- 79 18 93 %   10/10/22 0445 -- -- 81 16 94 %   10/10/22 0430 -- -- 84 20 92 %   10/10/22 0415 -- -- 80 27 92 %   10/10/22 0400 (!) 152/81 -- 83 23 96 %   10/10/22 0345 -- -- 90 20 (!) 83 %   10/10/22 0330 (!) 142/87 -- 85 18 92 %   10/10/22 0315 138/77 -- -- -- --   10/10/22 0200 (!) 140/76 -- 92 22 95 %   10/10/22 0156 (!) 140/76 -- 91 22 95 %   10/10/22 0004 (!) 149/81 97.8 °F (36.6 °C) 94 18 97 %   10/09/22 2347 -- 98.6 °F (37 °C) 96 17 98 %       Oxygen Saturation Interpretation: Normal    Diagnosis:  1. Fall, initial encounter    2. Closed fracture of multiple ribs of left side, initial encounter        Disposition:  Patient's disposition: As per trauma surgery  Patient's condition is stable.          Wilfred Harper DO  Resident  10/10/22 4698

## 2022-10-10 NOTE — CONSULTS
Inpatient Cardiology Consultation      Reason for Consult:  anticoagulation    Requesting Physician:  Maia Martinez MD    Date of Consultation: 10/10/2022    HISTORY OF PRESENT ILLNESS:   Mrs. Terri Roldan is a 76year old female who is known to Dr. Rajni Bartlett with history of CAD s/p LAD stent (5/2021), HTN, HLD, AFib on apixaban, and COVID-19 (12/2021). We are asked to see her regarding anticoagulant medications for LLE DVT while on apixaban. She was admitted to [facility] after a fall at [facility] last evening. She has noted mild LLE swelling for a few days prior to admission. She says that the swelling occurs intermittently, and she didn't think much of it. While walking into [place], her left leg \"gave out\" causing her to fall. She landed on her left side, sustaining left sided rib fractures. She still proceeded into [place] where she sat for 2 hours. When she got up to leave, her left leg had \"doubled in size\" and she \"couldn't walk\". She was found to have a DVT in the left lower extremity. She says she has faithfully taken her apixaban without missing any doses. She denies cardiac complaints. Please note: past medical records were reviewed per electronic medical record - see detailed reports under Past Medical/ Surgical History. Past Medical History:    Past Medical History:   Diagnosis Date    Hyperlipidemia     Hypertension     Thyroid disease        Past Surgical History:    Past Surgical History:   Procedure Laterality Date    CARPAL TUNNEL RELEASE      CATARACT REMOVAL      CHOLECYSTECTOMY      CORONARY ANGIOPLASTY WITH STENT PLACEMENT  05/07/2021    Dr Lia Hamilton LAD    FRACTURE SURGERY      HYSTERECTOMY (CERVIX STATUS UNKNOWN)      SKIN CANCER EXCISION      rt arm       Medications Prior to admit:  Prior to Admission medications    Medication Sig Start Date End Date Taking?  Authorizing Provider   hydroCHLOROthiazide (HYDRODIURIL) 25 MG tablet Take 25 mg by mouth daily 5/31/22   Historical Provider, MD clopidogrel (PLAVIX) 75 MG tablet Take 1 tablet by mouth daily 5/4/22   Carlitos Turner MD   apixaban (ELIQUIS) 5 MG TABS tablet Take 1 tablet by mouth 2 times daily 5/4/22   Carlitos Turner MD   metoprolol succinate (TOPROL XL) 50 MG extended release tablet Take 1 tablet by mouth daily 5/9/21   Jaelyn Booker MD   isosorbide mononitrate (IMDUR) 30 MG extended release tablet Take 1 tablet by mouth daily 5/9/21   Jaelyn Booker MD   atorvastatin (LIPITOR) 40 MG tablet Take 1 tablet by mouth nightly 5/8/21   Jaelyn Booker MD   amLODIPine (NORVASC) 10 MG tablet Take 10 mg by mouth daily    Historical Provider, MD       Current Medications:    Current Facility-Administered Medications: methocarbamol (ROBAXIN) tablet 500 mg, 500 mg, Oral, 4x Daily  sodium chloride flush 0.9 % injection 10 mL, 10 mL, IntraVENous, 2 times per day  sodium chloride flush 0.9 % injection 10 mL, 10 mL, IntraVENous, PRN  0.9 % sodium chloride infusion, , IntraVENous, PRN  ondansetron (ZOFRAN-ODT) disintegrating tablet 4 mg, 4 mg, Oral, Q8H PRN **OR** ondansetron (ZOFRAN) injection 4 mg, 4 mg, IntraVENous, Q6H PRN  polyethylene glycol (GLYCOLAX) packet 17 g, 17 g, Oral, Daily  acetaminophen (TYLENOL) tablet 650 mg, 650 mg, Oral, Q6H  oxyCODONE (ROXICODONE) immediate release tablet 2.5 mg, 2.5 mg, Oral, Q4H PRN  HYDROmorphone (DILAUDID) injection 0.25 mg, 0.25 mg, IntraVENous, Q3H PRN  sennosides-docusate sodium (SENOKOT-S) 8.6-50 MG tablet 1 tablet, 1 tablet, Oral, BID  bisacodyl (DULCOLAX) EC tablet 5 mg, 5 mg, Oral, Daily PRN  apixaban (ELIQUIS) tablet 5 mg, 5 mg, Oral, BID  atorvastatin (LIPITOR) tablet 40 mg, 40 mg, Oral, Nightly  isosorbide mononitrate (IMDUR) extended release tablet 30 mg, 30 mg, Oral, Daily  metoprolol succinate (TOPROL XL) extended release tablet 50 mg, 50 mg, Oral, Daily  clopidogrel (PLAVIX) tablet 75 mg, 75 mg, Oral, Daily  hydroCHLOROthiazide (HYDRODIURIL) tablet 25 mg, 25 mg, Oral, Daily  sodium chloride flush 0.9 % injection 5-40 mL, 5-40 mL, IntraVENous, 2 times per day  sodium chloride flush 0.9 % injection 5-40 mL, 5-40 mL, IntraVENous, PRN  0.9 % sodium chloride infusion, , IntraVENous, PRN  bisacodyl (DULCOLAX) suppository 10 mg, 10 mg, Rectal, Daily  polyethylene glycol (GLYCOLAX) packet 17 g, 17 g, Oral, Daily PRN  bisacodyl (DULCOLAX) suppository 10 mg, 10 mg, Rectal, Daily PRN    Allergies:  Latex, Aspirin, Andrés-allergin [diphenhydramine], Darvocet a500 [propoxyphene n-acetaminophen], Fish-derived products, Honey bee venom [bee venom], Lemon oil, Niacin and related, Nuts [peanut oil], Penicillins, Shellfish-derived products, and Wine [alcohol]    Social History:    Social History     Socioeconomic History    Marital status:      Spouse name: Not on file    Number of children: Not on file    Years of education: Not on file    Highest education level: Not on file   Occupational History    Not on file   Tobacco Use    Smoking status: Never    Smokeless tobacco: Never   Vaping Use    Vaping Use: Never used   Substance and Sexual Activity    Alcohol use: No     Comment: drinks 2 cups of coffee daily    Drug use: No    Sexual activity: Not on file   Other Topics Concern    Not on file   Social History Narrative    Not on file     Social Determinants of Health     Financial Resource Strain: Not on file   Food Insecurity: Not on file   Transportation Needs: Not on file   Physical Activity: Not on file   Stress: Not on file   Social Connections: Not on file   Intimate Partner Violence: Not on file   Housing Stability: Not on file       Family History:   No family history on file.     REVIEW OF SYSTEMS:     Review of Systems - History obtained from the patient  General ROS: negative for - chills, fever, night sweats, or weight loss   ENT ROS: negative for - epistaxis, headaches, nasal discharge, sore throat, vertigo, visual changes  Respiratory ROS: negative for - cough, hemoptysis, orthopnea, shortness of breath, wheezing  Cardiovascular ROS: see HPI  Gastrointestinal ROS: negative for - abdominal pain, blood in stools, constipation, diarrhea, heartburn, hematemesis, melena, nausea/vomiting  Genito-Urinary ROS: no dysuria, trouble voiding, or hematuria  Musculoskeletal ROS: negative for - joint pain, joint swelling, muscle pain, muscular weakness  Neurological ROS: negative for - confusion, dizziness, headaches, impaired coordination/balance, memory loss, numbness/tingling, seizures, speech problems, visual changes, weakness   Dermatological ROS: negative for - hair changes, nail changes, pruritus, or rash    PHYSICAL EXAM:  /80   Pulse 88   Temp 97.8 °F (36.6 °C)   Resp 20   SpO2 96%     General Appearance:    Alert, cooperative, no distress, appears stated age    Head:    Normocephalic, without obvious abnormality, atraumatic    Eyes:    PERRL, conjunctiva/corneas clear, EOM's intact    Neck:   Supple, symmetrical, trachea midline; no carotid    bruit or JVP    Lungs:     Clear to auscultation bilaterally, respirations unlabored, no wheezing, rales or rhonchi    Heart:    Regular rate and rhythm, no murmur, rub   or gallop    Abdomen:     Soft, non-tender, non-distended    Extremities:   Extremities normal, atraumatic, no cyanosis or edema    Skin:   Skin color, texture, turgor normal for age    Neurologic:   Oriented x3, CNII-XII intact. Normal gross strength and sensation       DATA:    ECG: atrial fibrillation; rate controlled. Non specific ST-T changes      Coronary angiogram: n/a    No intake or output data in the 24 hours ending 10/10/22 1637    Labs:   CBC:   Recent Labs     10/10/22  0132   WBC 9.6   HGB 10.8*   HCT 33.6*        BMP:   Recent Labs     10/10/22  0132      K 4.3   CO2 26   BUN 17   CREATININE 1.4*   LABGLOM 36   CALCIUM 9.1     Mag: No results for input(s): MG in the last 72 hours. Phos: No results for input(s): PHOS in the last 72 hours.   TSH: No results for input(s): TSH in the last 72 hours. HgA1c:   Lab Results   Component Value Date    LABA1C 6.2 (H) 05/06/2021     No results found for: EAG  proBNP:   Recent Labs     10/10/22  0132   PROBNP 2,811*     PT/INR: No results for input(s): PROTIME, INR in the last 72 hours. APTT:No results for input(s): APTT in the last 72 hours.   CARDIAC ENZYMES:  Recent Labs     10/10/22  0132   CKTOTAL 46     FASTING LIPID PANEL:  Lab Results   Component Value Date/Time    CHOL 215 05/06/2021 01:22 AM    HDL 36 05/06/2021 01:22 AM    LDLCALC 115 05/06/2021 01:22 AM    TRIG 319 05/06/2021 01:22 AM     LIVER PROFILE:  Recent Labs     10/10/22  0132   AST 10   ALT 9   LABALBU 3.9     Discussed with Dr. Ida Husain; please see his recommendations below  Electronically signed by SHAHBAZ Duff on 10/10/2022 at 4:37 PM        ASSESSMENT:  LLE DVT - failed apixaban therapy  Mechanical fall  Atrial fibrillation - rate controlled  CAD s/p LAD stent 5/2021 - stable  HTN  HLD  Hx COVID-19 (12/2021)    PLAN:  Recommend hematology consultation given failed apixaban therapy  Consider switch to rivaroxaban pending heme/onc recommendations/work up  No advanced cardiac testing planned

## 2022-10-10 NOTE — H&P
TRAUMA HISTORY & PHYSICAL  Surgical Resident/Advance Practice Nurse  10/10/2022  4:25 AM    PRIMARY SURVEY    CHIEF COMPLAINT:  Trauma consult. Patients leg gave out at [facility]. Complaining of left anterior chest pain and LLE swelling     AIRWAY:   Airway Normal  EMS ETT Absent  Noisy respirations Absent  Retractions: Absent  Vomiting/bleeding: Absent      BREATHING:    Midaxillary breath sound left:  Normal  Midaxillary breath sound right:  Normal    Cough sound intensity:  fair   FiO2:  Room air     mL. CIRCULATION:   Femerol pulse intensity: Strong  Palpebral conjunctiva: Red    Vitals:    10/10/22 0400   BP: (!) 152/81   Pulse: 83   Resp: 23   Temp:    SpO2: 96%       Vitals:    10/10/22 0315 10/10/22 0330 10/10/22 0345 10/10/22 0400   BP: 138/77 (!) 142/87  (!) 152/81   Pulse:  85 90 83   Resp:  18 20 23   Temp:       SpO2:  92% (!) 83% 96%        FAST EXAM: Deferred    Central Nervous System    GCS Initial 15 minutes   Eye  Motor  Verbal 4 - Opens eyes on own  6 - Follows simple motor commands  5 - Alert and oriented 4 - Opens eyes on own  6 - Follows simple motor commands  5 - Alert and oriented     Neuromuscular blockade: No  Pupil size:  Left 2 mm    Right 2 mm  Pupil reaction: Yes    Wiggles fingers: Left Yes Right Yes  Wiggles toes: Left Yes   Right Yes    Hand grasp:   Left  Present      Right  Present  Plantar flexion: Left  Present      Right   Present    Loss of consciousness:  No    History Obtained From:  Patient  Private Medical Doctor: Bere Myers, DO      Pre-exisiting Medical History:  yes    Conditions:   Past Medical History:   Diagnosis Date    Hyperlipidemia     Hypertension     Thyroid disease          Medications:   No current facility-administered medications on file prior to encounter.      Current Outpatient Medications on File Prior to Encounter   Medication Sig Dispense Refill    hydroCHLOROthiazide (HYDRODIURIL) 25 MG tablet Take 25 mg by mouth daily      clopidogrel (PLAVIX) 75 MG tablet Take 1 tablet by mouth daily 90 tablet 3    apixaban (ELIQUIS) 5 MG TABS tablet Take 1 tablet by mouth 2 times daily 180 tablet 3    guaiFENesin 400 MG tablet TAKE 1 TABLET BY MOUTH TWICE DAILY      carvedilol (COREG) 12.5 MG tablet Take 12.5 mg by mouth 2 times daily (with meals)      metoprolol succinate (TOPROL XL) 50 MG extended release tablet Take 1 tablet by mouth daily 30 tablet 3    isosorbide mononitrate (IMDUR) 30 MG extended release tablet Take 1 tablet by mouth daily 30 tablet 3    atorvastatin (LIPITOR) 40 MG tablet Take 1 tablet by mouth nightly 30 tablet 3    vitamin D (CHOLECALCIFEROL) 25 MCG (1000 UT) TABS tablet Take 1,000 Units by mouth daily      fluticasone (FLONASE) 50 MCG/ACT nasal spray 1 spray by Each Nostril route 2 times daily      amLODIPine (NORVASC) 10 MG tablet Take 10 mg by mouth daily             Allergies:    Allergies   Allergen Reactions    Latex     Aspirin     Andrés-Allergin [Diphenhydramine]     Darvocet A500 [Propoxyphene N-Acetaminophen]     Fish-Derived Products     Honey Bee Venom [Bee Venom]     Lemon Oil     Niacin And Related     Nuts [Peanut Oil]     Penicillins     Shellfish-Derived Products     Wine [Alcohol]      Beer, wine and diet pop         Social History:   Tobacco use:  none  Alcohol use:  none  Illicit drug use:  no history of illicit drug use    Past Surgical History:    Past Surgical History:   Procedure Laterality Date    CARPAL TUNNEL RELEASE      CATARACT REMOVAL      CHOLECYSTECTOMY      CORONARY ANGIOPLASTY WITH STENT PLACEMENT  05/07/2021    Dr Alexi Roberts LAD    FRACTURE SURGERY      HYSTERECTOMY (CERVIX STATUS UNKNOWN)      SKIN CANCER EXCISION      rt arm         Anticoagulant use: Apixaban (Eliquis)  Antiplatelet use:   Plavix    NSAID use in last 72 hours: no  Taken PCN in past:  yes  Last food/drink: yesterday  Last tetanus: today    Family History:   No family history of anesthesia complications    Complaints:   Head:  None  Neck: None  Chest:   Mild  Back:   None  Abdomen:   None  Extremities:   Mild  Comments: anterior left chest tenderness and left calf pain     Review of systems:  All negative unless otherwise noted. SECONDARY SURVEY  Head/scalp: Atraumatic    Face: Atraumatic    Eyes/ears/nose: Atraumatic    Pharynx/mouth: Atraumatic    Neck: Atraumatic     Cervical spine tenderness:   Cervical collar not indicated  Pain:  none  ROM:  intact without pain     Chest wall:  Atraumatic    Heart:  Regular rate & rhythm    Abdomen: Atraumatic. Soft ND  Tenderness:  none    Pelvis: Atraumatic  Tenderness: none    Thoracolumbar spine: Atraumatic  Tenderness:  none    Genitourinary:  Atraumatic. No blood or urine noted    Rectum: Atraumatic. No blood noted. Perineum: Atraumatic. No blood or urine noted. Extremities:   Sensory normal  Motor normal    Distal Pulses  Left arm normal  Right arm normal  Left leg normal  Right leg normal    Capillary refill  Left arm normal  Right arm normal  Left leg normal  Right leg normal    Procedures in ED:   none    In the event of Emergency Blood Transfusion:  Due to the critical condition of this patient, I request the immediate release of blood products for emergency transfusion secondary to shock. I understand the increased risks incurred by the lack of complete transfusion testing.       Radiology: CT head, C spine, Chest, XR L wrist, BL knee, L TIB FIB, RIBS, L ankle     Consultations:  none    Admission/Diagnosis: Fall, possible L rib fx     Plan of Treatment:pain control     Plan discussed with Dr. Pineda Stanford    at 10/10/2022 on 4:25 AM    Electronically signed by Marisol Cardozo MD on 10/10/2022 at 4:25 AM

## 2022-10-10 NOTE — PROGRESS NOTES
10/10/22 1100 (!) 135/93 80 18 92 %       No intake/output data recorded. No intake/output data recorded. Past Medical History:   Diagnosis Date    Hyperlipidemia     Hypertension     Thyroid disease        @[email or web address]@    Radiology:  US DUP LOWER EXTREMITIES BILATERAL VENOUS   Final Result   Positive nonocclusive DVT affecting the left posterior tibial vein. RECOMMENDATIONS:   Unavailable         XR PELVIS (1-2 VIEWS)   Final Result   No acute bony abnormality. CT HEAD WO CONTRAST   Final Result   No acute intracranial abnormality. CT CERVICAL SPINE WO CONTRAST   Final Result   No acute abnormality of the cervical spine. Multilevel degenerative change with moderate spinal canal stenosis at C5-C6. CT CHEST WO CONTRAST   Final Result   Age-indeterminate fractures of the left 2nd, 4th through 7th ribs anterior   laterally         XR WRIST LEFT (MIN 3 VIEWS)   Final Result   No acute findings. XR TIBIA FIBULA LEFT (2 VIEWS)   Final Result   No acute osseous abnormality. XR RIBS LEFT INCLUDE CHEST (MIN 3 VIEWS)   Final Result   No acute process. XR KNEE RIGHT (1-2 VIEWS)   Final Result   No acute abnormality of the knee. XR KNEE LEFT (1-2 VIEWS)   Final Result   No acute abnormality of the knee. XR ANKLE LEFT (MIN 3 VIEWS)   Final Result   No acute osseous abnormality of the ankle. Lateral ankle edema.              PHYSICAL EXAM:     Central Nervous System  Loss of consciousness:  No    GCS:    Eye:  4 - Opens eyes on own  Motor:  6 - Follows simple motor commands  Verbal:  5 - Alert and oriented    Neuromuscular blockade: No  Pupil size:  Left 3 mm    Right 3 mm  Pupil reaction: Yes    Wiggles fingers: Left Yes Right Yes  Wiggles toes: Left Yes   Right Yes    Hand grasp:   Left  Present      Right  Present  Plantar flexion: Left  Weak      Right   Present    PHYSICAL EXAM  General: No apparent distress, comfortable   HEENT: Trachea midline, no masses, Pupils equal round   Chest: Respiratory effort was normal with no retractions or use of accessory muscles. L chest wall tenderness   Cardiovascular: Extremities warm, well perfused,   Abdomen:  Soft and non distended. No tenderness, guarding, rebound, or rigidity   Extremities: Moves all 4 extremeties, LLE edema     Spine:   Spine Tenderness ROM   Cervical 0/10 Normal   Thoracic 0 /10 Normal   Lumbar 0 /10 Normal     Musculoskeletal:    Joint Tenderness Swelling ROM   Right shoulder Absent absent normal   Left shoulder absent absent normal   Right elbow absent absent normal   Left elbow absent absent normal   Right wrist absent absent normal   Left wrist absent absent normal   Right hand grasp Absent absent normal   Left hand grasp absent absent normal   Right hip absent absent normal   Left hip absent absent normal   Right knee Absent absent normal   Left knee absent absent normal   Right ankle absent absent normal   Left ankle present present normal   Right foot Absent absent normal   Left foot Present  Present  normal       CONSULTS: cardiology     PROCEDURES:     INJURIES:      Principal Problem:    Trauma  Active Problems:    Acute deep vein thrombosis (DVT) of tibial vein of left lower extremity (HCC)    Closed fracture of multiple ribs of left side    Anticoagulation management encounter    Fall    Coronary artery disease involving native coronary artery of native heart with angina pectoris (HCC)    Longstanding persistent atrial fibrillation (HCC)    Obesity (BMI 35.0-39.9 without comorbidity)  Resolved Problems:    * No resolved hospital problems. *        Assessment/Plan:       Neuro:  GCS 15, no acute issues    Acute pain control - tylenol, oxycodone, robaxin, lidocaine patch PRN dilaudid   CV: HR near normal limits.  Hx Afib on Eliquis - cardiology consulted for [address] recommendations, recommend heme/onc consult   Resume home lipitor, HCTZ, Imdur, metoprolol   Pulm: L rib fractires - pulmonary hygiene   GI: general diet   Renal: no acute issues   ID: afebrile, no acute issues     Endocrine: no acute issues, blood glucose < 180  MSK: LLE DVT - continue Griseldaquis, [address] pending heme onc recs. PT/OT  Heme: no indications for transfusion. Bowel regime: senna, dulcolax, glycolax  Pain control/Sedation: tylenol, robaxin, oxy, dilaudid lidocaine patch   DVT prophylaxis: eliquis     GI: diet   Glucose protocol: none  Mouth/Eye care: per patient.    Borges: none     Code status:    Full Code    Patient/Family update:  As available     Disposition:  pending  PT/OT eval      Electronically signed by Yg Teresa MD on 10/10/22 at 6:28 PM EDT

## 2022-10-10 NOTE — H&P
TRAUMA SURGERY HISTORY & PHYSICAL  TRAUMA ATTENDING      Attending Physician Statement:  I have examined the patient in ED- 22, reviewed the record, and discussed the case with the resident/APN. I agree with the  assessment and plan with the following corrections/additions. CC: Fall while at bin    HISTORY   The patient is a 77-year-old female who sustained a fall at approximately last night while at [facility]. The patient reported  acute, constant  sharp   pain localized to the left chest wall that started after the . The intensity of the pain is moderate. Pain does not radiate. There are no alleviating factors. Worsening factors include movement activity. Patient also notes that her left calf appears to be more swollen in the past patient takes Eliquis       A trauma consult was requested to assist, guide,  and expedite further evaluation and treatment for the patient.       P    Past medical/surgical/social history:  as noted in resident/APN note    Family History:   no anesthetic complications      Review of Systems:  General ROS: negative  Psychological ROS: negative  ENT ROS: negative  Hematological and Lymphatic ROS: negative  Respiratory ROS: negative  Cardiovascular ROS: negative  Gastrointestinal ROS: negative  Genito-Urinary ROS: negative  Musculoskeletal ROS: Positive for chronic leg swelling, positive for left chest wall pain  Neurological ROS: negative     PHYSICAL EXAM:  Vitals:    10/10/22 1200   BP: (!) 148/81   Pulse: 82   Resp: 21   Temp:    SpO2:        Neuro:   GCS 15     Moving all extremities    Reactive, equal pupils  Psychiatric:  Affect , Judgement appropriate   Alert and oriented to self, place, time  Head/Face:    no soft tissue injury   no bony deformities  Eyes:     Vision/EOM grossly intact  Ears:     no otorrhagia  Nose:      no epistaxis  Mouth:     no intraoral lacerations/ecchymoses  Neck:       no tracheal deviation    no soft tissue injury  Chest:     no deformities   non tender  Lungs:      equal and clear bilateral breath sounds   no use of accessory muscles  Heart:     normal sinus rhythm   warm, well perfused   strong femoral pulses bilaterally  Abdomen:     non distended   non tender   no soft tissue injury  Back:     no soft tissue injury   no deformities   nontender   Musculoskeletal:    Gait not inspected due to patient on trauma bed  RUE:   no soft tissue injury, swelling, or deformity  RLE: Mild right-sided leg swelling  LUE:   no soft tissue injury, swelling, or deformity  LLE:   Left calf more swollen and tender compared to the right         DIAGNOSTIC/ LAB FINDINGS  I personally reviewed the diagnostic imaging for this trauma and my interpretation is:   Duplex ultrasound of bilateral lower legs shows left posterior tibial DVT  CT scan of the chest shows multiple left-sided rib fractures  CT head negative     I have reviewed the following lab test results: CBC-hemoglobin 47.1, basic metabolic panel-sodium 661 potassium 4.3 creatinine 1.4      ASSESSMENT:  Principal Problem:    Trauma  Active Problems:    Acute deep vein thrombosis (DVT) of tibial vein of left lower extremity (HCC)    Closed fracture of multiple ribs of left side  Resolved Problems:    * No resolved hospital problems. *      PLAN:  Admit to [facility] floor    Multiple left-sided rib fractures-needs aggressive pain control and pulmonary toilet    Acute DVT of posterior tibial vein of left lower extremity-continue Maria R Oh MD, MD, [facility]  10/10/2022  12:26 PM      NOTE: This report was transcribed using voice recognition software. Every effort was made to ensure accuracy; however, inadvertent computerized transcription errors may be present.

## 2022-10-10 NOTE — PROGRESS NOTES
Radiology Procedure Waiver   Name: Irene Pierson  : 1945  MRN: [id number]    Date:  10/10/22    Time: 2:45 AM EDT    Benefits of immediately proceeding with radiology exam(s) without pre-testing outweigh the risks or are not indicated as specified below and therefore the following is/are being waived:    [x] Benefits of immediate radiology exam(s) outweigh any risk. OR    Pre-exam testing is not indicated for the following reason(s):  [] Pregnancy test   [] Patients LMP on-time and regular.   [] Patient had Tubal Ligation or has other Contraception Device. [] Patient  is Menopausal or Premenarcheal.    [] Patient had Full or Partial Hysterectomy. [] Protocol for CT contrast allegry   [] Patient has tolerated well previously   [] Patient does not have a true allergy    [] MRI Questionnaire     [] BUN/Creatinine   [] Patient age w/no hx of renal dysfunction. [] Patient on Dialysis. [] Recent Normal Labs.   Electronically signed by Lis Ricardo DO on 10/10/22 at 2:45 AM EDT

## 2022-10-10 NOTE — DISCHARGE SUMMARY
Physician Discharge Summary     Patient ID:  Carlota Perez  [id number]  51 y.o.  1945    Admit date: 10/10/2022    Discharge date and time: 10/13/22      Admitting Physician: Whitley Aguirre MD     Admission Diagnoses: Trauma Yo Cunningham  Fall, initial encounter [W19. XXXA]  Closed fracture of multiple ribs of left side, initial encounter [S22.42XA]  DVT (deep vein thrombosis) in pregnancy [O22.30]    Discharge Diagnoses: Principal Problem:    Trauma  Active Problems:    Acute deep vein thrombosis (DVT) of tibial vein of left lower extremity (HCC)    Closed fracture of multiple ribs of left side    Anticoagulation management encounter    Fall    Coronary artery disease involving native coronary artery of native heart with angina pectoris (HCC)    Longstanding persistent atrial fibrillation ([facility] [address]    DVT (deep vein thrombosis) in pregnancy    Obesity (BMI 35.0-39.9 without comorbidity)  Resolved Problems:    * No resolved hospital problems. *      Admission Condition: fair    Discharged Condition: stable    Indication for Admission: fall with multiple L rib fx     Hospital Course/Procedures/Operation/treatments:   10/10: trauma consult: leg gave out oat bingo. Multiple mutiage L rib fx. US LLE DVT. Cardiology and heme/onc consulted for [address] [place] [address]. No new findings on tertiary   10/11: PT/OT eval; [address] [place] [address] xarelto  10/12: 22/24 with PT OT. Dispo planning            Consults:   IP CONSULT TO TRAUMA SURGERY  IP CONSULT TO CARDIOLOGY  IP CONSULT TO ONCOLOGY    Significant Diagnostic Studies:   XR RIBS LEFT INCLUDE CHEST (MIN 3 VIEWS)    Result Date: 10/10/2022  EXAMINATION: XRAY VIEWS OF THE LEFT RIBS WITH FRONTAL XRAY VIEW OF THE CHEST 10/10/2022 1:02 am COMPARISON: None. HISTORY: ORDERING SYSTEM PROVIDED HISTORY: fall TECHNOLOGIST PROVIDED HISTORY: Reason for exam:->fall What reading provider will be dictating this exam?->CRC FINDINGS: The lungs are without acute focal process. There is no effusion or pneumothorax. Cardiomegaly. The osseous structures are without acute process. No acute process. XR PELVIS (1-2 VIEWS)    Result Date: 10/10/2022  EXAMINATION: ONE XRAY VIEW OF THE PELVIS 10/10/2022 7:01 am COMPARISON: None. HISTORY: ORDERING SYSTEM PROVIDED HISTORY: trauma TECHNOLOGIST PROVIDED HISTORY: Reason for exam:->trauma What reading provider will be dictating this exam?->CRC FINDINGS: Imaging of the pelvis reveals minimal degenerative changes seen within the sacroiliac joints bilaterally and pubic symphysis. Cortex is intact. Joint spaces are preserved. No acute bony abnormality. Phleboliths seen on both sides of the pelvis. No acute bony abnormality. XR WRIST LEFT (MIN 3 VIEWS)    Result Date: 10/10/2022  EXAMINATION: XRAY VIEWS OF THE LEFT WRIST 10/10/2022 1:02 am COMPARISON: None. HISTORY: ORDERING SYSTEM PROVIDED HISTORY: fall TECHNOLOGIST PROVIDED HISTORY: Reason for exam:->fall What reading provider will be dictating this exam?->CRC FINDINGS: No acute fracture or dislocation is identified. No destructive osseous lesion is visualized. The joint spaces are preserved. No acute findings. XR KNEE LEFT (1-2 VIEWS)    Result Date: 10/10/2022  EXAMINATION: TWO XRAY VIEWS OF THE LEFT KNEE 10/10/2022 1:02 am COMPARISON: None. HISTORY: ORDERING SYSTEM PROVIDED HISTORY: eval pain TECHNOLOGIST PROVIDED HISTORY: Reason for exam:->eval pain What reading provider will be dictating this exam?->CRC FINDINGS: Narrowing of the medial compartment. Lateral femoral condyle marginal osteophyte. No evidence of acute fracture or dislocation. No focal osseous lesion. No evidence of joint effusion. No focal soft tissue abnormality. No acute abnormality of the knee. XR KNEE RIGHT (1-2 VIEWS)    Result Date: 10/10/2022  EXAMINATION: TWO XRAY VIEWS OF THE RIGHT KNEE 10/10/2022 1:02 am COMPARISON: None.  HISTORY: ORDERING SYSTEM PROVIDED HISTORY: eval swelling TECHNOLOGIST PROVIDED HISTORY: Reason for exam:->eval swelling What reading provider will be dictating this exam?->CRC FINDINGS: No evidence of acute fracture or dislocation. No focal osseous lesion. No evidence of joint effusion. No focal soft tissue abnormality. Narrowing of the medial compartment with associated marginal osteophytes on both sides of the joint space. Superior and inferior patellar spurs. No acute abnormality of the knee. XR TIBIA FIBULA LEFT (2 VIEWS)    Result Date: 10/10/2022  EXAMINATION: XRAY VIEWS OF THE LEFT TIBIA AND FIBULA 10/10/2022 1:02 am COMPARISON: None. HISTORY: ORDERING SYSTEM PROVIDED HISTORY: fall TECHNOLOGIST PROVIDED HISTORY: Reason for exam:->fall What reading provider will be dictating this exam?->CRC FINDINGS: There is no evidence of acute fracture. There is normal alignment. No acute joint abnormality. Dorsal and plantar calcaneal spurs. Diffuse soft tissue edema. Narrowing of the medial compartment of the knee. No acute osseous abnormality. XR ANKLE LEFT (MIN 3 VIEWS)    Result Date: 10/10/2022  EXAMINATION: THREE XRAY VIEWS OF THE LEFT ANKLE 10/10/2022 1:01 am COMPARISON: None. HISTORY: ORDERING SYSTEM PROVIDED HISTORY: fall, pain TECHNOLOGIST PROVIDED HISTORY: Reason for exam:->fall, pain What reading provider will be dictating this exam?->CRC FINDINGS: No evidence of acute fracture or dislocation. Normal alignment of the ankle mortise. Dorsal and plantar calcaneal spurs. No evidence of joint effusion. Lateral ankle edema. No acute osseous abnormality of the ankle. Lateral ankle edema. CT HEAD WO CONTRAST    Result Date: 10/10/2022  EXAMINATION: CT OF THE HEAD WITHOUT CONTRAST  10/10/2022 2:02 am TECHNIQUE: CT of the head was performed without the administration of intravenous contrast. Automated exposure control, iterative reconstruction, and/or weight based adjustment of the mA/kV was utilized to reduce the radiation dose to as low as reasonably achievable. COMPARISON: None.  HISTORY: ORDERING SYSTEM PROVIDED HISTORY: fall TECHNOLOGIST PROVIDED HISTORY: Reason for exam:->fall Has a \"code stroke\" or \"stroke alert\" been called? ->No Decision Support Exception - unselect if not a suspected or confirmed emergency medical condition->Emergency Medical Condition (MA) What reading provider will be dictating this exam?->CRC FINDINGS: BRAIN/VENTRICLES: There is no acute intracranial hemorrhage, mass effect or midline shift. No abnormal extra-axial fluid collection. The gray-white differentiation is maintained without evidence of an acute infarct. There is no evidence of hydrocephalus. ORBITS: The visualized portion of the orbits demonstrate no acute abnormality. SINUSES: The visualized paranasal sinuses and mastoid air cells demonstrate no acute abnormality. SOFT TISSUES/SKULL:  No acute abnormality of the visualized skull or soft tissues. No acute intracranial abnormality. CT CHEST WO CONTRAST    Result Date: 10/10/2022  EXAMINATION: CT OF THE CHEST WITHOUT CONTRAST 10/10/2022 2:02 am TECHNIQUE: CT of the chest was performed without the administration of intravenous contrast. Multiplanar reformatted images are provided for review. Automated exposure control, iterative reconstruction, and/or weight based adjustment of the mA/kV was utilized to reduce the radiation dose to as low as reasonably achievable. COMPARISON: None. HISTORY: ORDERING SYSTEM PROVIDED HISTORY: fall, left rib pain TECHNOLOGIST PROVIDED HISTORY: Reason for exam:->fall, left rib pain Decision Support Exception - unselect if not a suspected or confirmed emergency medical condition->Emergency Medical Condition (MA) What reading provider will be dictating this exam?->CRC FINDINGS: Aorta: No evidence of thoracic aortic aneurysm or dissection. No acute abnormality of the aorta. Mediastinum: No evidence of mediastinal lymphadenopathy. The heart and pericardium demonstrate no acute abnormality. Lungs/Pleura:  The lungs are without acute process. No focal consolidation or pulmonary edema. No evidence of pleural effusion or pneumothorax. Upper Abdomen: Limited images of the upper abdomen are unremarkable. There are age indeterminate fractures of the left 2nd, 4th-7th ribs anterior laterally. Age-indeterminate fractures of the left 2nd, 4th through 7th ribs anterior laterally     CT CERVICAL SPINE WO CONTRAST    Result Date: 10/10/2022  EXAMINATION: CT OF THE CERVICAL SPINE WITHOUT CONTRAST 10/10/2022 2:02 am TECHNIQUE: CT of the cervical spine was performed without the administration of intravenous contrast. Multiplanar reformatted images are provided for review. Automated exposure control, iterative reconstruction, and/or weight based adjustment of the mA/kV was utilized to reduce the radiation dose to as low as reasonably achievable. COMPARISON: None. HISTORY: ORDERING SYSTEM PROVIDED HISTORY: fall TECHNOLOGIST PROVIDED HISTORY: Reason for exam:->fall Decision Support Exception - unselect if not a suspected or confirmed emergency medical condition->Emergency Medical Condition (MA) What reading provider will be dictating this exam?->CRC FINDINGS: BONES/ALIGNMENT: There is no acute fracture or traumatic malalignment. DEGENERATIVE CHANGES: There is degenerative disc disease and mild facet arthropathy. There is moderate spinal canal stenosis C5-C6 SOFT TISSUES: There is no prevertebral soft tissue swelling. No acute abnormality of the cervical spine. Multilevel degenerative change with moderate spinal canal stenosis at C5-C6. US DUP LOWER EXTREMITIES BILATERAL VENOUS    Result Date: 10/10/2022  EXAMINATION: DUPLEX VENOUS ULTRASOUND OF THE BILATERAL LOWER [id number]/10/2022 9:04 am TECHNIQUE: Duplex ultrasound using B-mode/gray scaled imaging, Doppler spectral analysis and color flow Doppler was obtained of the deep venous structures of the lower bilateral extremities. COMPARISON: None.  HISTORY: ORDERING SYSTEM PROVIDED HISTORY: LLE Swelling pain TECHNOLOGIST PROVIDED HISTORY: Reason for exam:->LLE Swelling pain What reading provider will be dictating this exam?->CRC FINDINGS: RIGHT LOWER EXTREMITY: There is normal compressibility, phase flow pattern and augmentation demonstration for the right common femoral, superficial femoral and popliteal veins. There are patency of the deep veins in the right calf area. There are patency of the distal right external iliac vein and the junction between the great saphenous vein with right common femoral vein. No evidence for DVT in the right lower extremity. LEFT LOWER EXTREMITY: There is lack of proper compressibility in the left posterior tibial vein which indicate the presence of a nonocclusive DVT in the left calf area. There is normal compressibility, phase flow pattern and augmentation demonstration for the left common femoral, superficial femoral and popliteal vein. There are patency of the distal left external iliac vein and the junction between the great saphenous vein with the left common femoral vein. Positive nonocclusive DVT affecting the left posterior tibial vein. RECOMMENDATIONS: Unavailable       Discharge Exam:  Awake and alert x3  No apparent distress  Lungs clear, left chest wall tenderness  Abdomen soft nontender nondistended  Left lower extremity more swollen than right    Disposition: home    In process/preliminary results:  Outstanding Order Results       No orders found from 9/11/2022 to 10/11/2022.             Patient Instructions:   Current Discharge Medication List             Details   lidocaine 4 % external patch Place 1 patch onto the skin daily for 14 days  Qty: 14 each, Refills: 0      methocarbamol (ROBAXIN) 500 MG tablet Take 1 tablet by mouth 4 times daily for 10 days  Qty: 40 tablet, Refills: 0      rivaroxaban (XARELTO) 20 MG TABS tablet Take 1 tablet by mouth daily for 90 doses  Qty: 30 tablet, Refills: 2      tamsulosin (FLOMAX) 0.4 MG capsule Take 1 capsule by removed    MEDICATION INSTRUCTIONS  Take medication as prescribed. When taking pain medications, you may experience dizziness or drowsiness. Do not drink alcohol or drive when taking these medications. You may experience constipation while taking pain medication. You may take over the counter stool softeners such as docusate (Colace), sennosides S (Senokot-S), or Miralax.   []  You may take Ibuprofen (over the counter) as directed for mild pain. --You may take up to 800mg every 8 hours for pain, please take with food or milk. [x]  You may take acetaminophen (Tylenol) products. Do NOT take more than 4000mg of Tylenol in 24h. [x]  Do not take any other acetaminophen (Tylenol) products if you are taking Percocet or Norco, as these contain Tylenol. --Do NOT take more than 4000mg of Tylenol in 24h. OPIOID MEDICATION INSTRUCTIONS  Read the medication guide that is included with your prescription. Take your medication exactly as prescribed. Store medication away from children and in a safe place. Do NOT share your medication with others. Do NOT take medication unless it is prescribed for you. Do NOT drink alcohol while taking opioids (I.e., Norco, Percocet, Oxycodone, etc). Discuss with the Trauma Clinic staff if the dose of medication you are taking does not control your pain and any side effects that you may be having. CALL 911 OR YOUR LOCAL EMERGENCY SERVICE:  --If you take too much medication  --If you have trouble breathing or shortness of breath  --A child has taken this medication. WORK:  You may not return to work until you receive follow-up with the Trauma Clinic or clearance by all consultants. Call the trauma clinic for any of the following or for questions/concerns;  --fever over 101F  --redness, swelling, hardness or warmth at the wound site(s).   --Unrelieved nausea/vomiting  --Foul smelling or cloudy drainage at the wound site(s)  --Unrelieved pain or increase in pain  --Increase in shortness of breath    Follow-up:  Trauma Clinic: [phone] option 2  [address], [address] INSTRUCTIONS    Your ribs are curved bones in your chest that protect inner structures like your lungs and heart. The ribs expand and contract when you breathe. Pain can result making it hard to cough or breathe deeply. The difficulty increases if several ribs are broken on both sides. You are likely to heal in 6 to 8 weeks, but may take longer if you are elderly. Most rib fractures heal on their own with no lasting effects. Treatment:   Take the medications given to you as prescribed. Your pain may not go completely away after discharge from the hospital, but we want your pain tolerable so you can take deep breaths. As your pain becomes controlled you may switch to taking over-the-counter medications such as Tylenol and or Ibuprofen as directed. Tylenol (acetaminophen) 1000mg every 6 hours or you may take 650mg every 4 hours. Ibuprofen up to 800mg every 8 hours. (Please take with food or milk). Over the counter creams and patches such as Salon Pas, [employer], Aspercream, can be useful as well. You were likely given a breathing device called an incentive spirometer while in the hospital to practice deep breathing. It is advised to continue this several times a day while at home to prevent pneumonia. Prevent Complications:  Try to take 5-10 deep breaths every hour while awake. Use the incentive spirometer often. Set goals of deeper breathing every couple of days until reaching normal adult level of about 2500 ml on the printed scale. Activity:  Avoid further chest injury. Plan quiet activity for the first few days. Do not stay in bed. Walk around and move. No rough activities with family, friends or pets. No sports, jumping, jogging or gymnastics.   Ask your doctor or the trauma clinic when you will be able to resume these activities. Symptoms to Report:  Call your doctor right away if you notice any of these symptoms:   Increased chest pain  Shortness of breath  Fever  Coughing up blood    Call 911 immediately if these symptoms are severe. Follow-up:  Trauma Clinic: [phone] option 2  [address]  [place], [address] [address]    SPECIAL CONSIDERATIONS FOR OUR PATIENTS OVER THE AGE OF 65Y    Getting around your home safely can be a challenge if you have injuries or health problems that make it easy for you to fall. Loose rugs and furniture in walkways are among the dangers for many older people who have problems walking or who have poor eyesight. People who have conditions such as arthritis, osteoporosis, or dementia also must be careful not to fall. You can make your home safer with a few simple measures. Follow-up care is a key part of your treatment and safety. Be sure to make and go to all appointments, and call your doctor or nurse call line if you are having problems. It's also a good idea to know your test results and keep a list of the medicines you take. How can you care for yourself at home? Taking care of yourself  You may get dizzy if you do not drink enough water. To prevent dehydration, drink plenty of fluids, enough so that your urine is light yellow or clear like water. Choose water and other caffeine-free clear liquids. If you have kidney, heart, or liver disease and have to limit fluids, talk with your doctor before you increase the amount of fluids you drink. Exercise regularly to improve your strength, muscle tone, and balance. Walk if you can. Swimming may be a good choice if you cannot walk easily. Have your vision and hearing checked each year or any time you notice a change. If you have trouble seeing and hearing, you might not be able to avoid objects and could lose your balance.   Know the side effects of the medicines you take. Ask your doctor or pharmacist whether the medicines you take can affect your balance. Sleeping pills or sedatives can affect your balance. Limit the amount of alcohol you drink. Alcohol can impair your balance and other senses. Ask your doctor whether calluses or corns on your feet need to be removed. If you wear loose-fitting shoes because of calluses or corns, you can lose your balance and fall. Talk to your doctor if you have numbness in your feet. Preventing falls at home  Remove raised doorway thresholds, throw rugs, and clutter. Repair loose carpet or raised areas in the floor. Move furniture and electrical cords to keep them out of walking paths. Use non-skid floor wax, and wipe up spills right away, especially on ceramic tile floors. If you use a walker or cane, put rubber tips on it. If you use crutches, clean the bottoms of them regularly with an abrasive pad, such as steel wool. Keep your house well lit, especially stairways, porches, and outside walkways. Use night-lights in areas such as hallways and washrooms. Add extra light switches or use remote switches (such as switches that go on or off when you clap your hands) to make it easier to turn lights on if you have to get up during the night. Install sturdy handrails on stairways. Move items in your cabinets so that the things you use a lot are on the lower shelves (about waist level). Keep a cordless phone and a flashlight with new batteries by your bed. If possible, put a phone in each of the main rooms of your house, or carry a cell phone in case you fall and cannot reach a phone. Or, you can wear a device around your neck or wrist. You push a button that sends a signal for help. Wear low-heeled shoes that fit well and give your feet good support. Use footwear with non-skid soles. Check the heels and soles of your shoes for wear. Repair or replace worn heels or soles.   Do not wear socks without shoes on wood floors. Walk on the grass when the sidewalks are slippery. If you live in an area that gets snow and ice in the winter, sprinkle salt on slippery steps and sidewalks. Preventing falls in the bath  Install grab bars and non-skid mats inside and outside your shower or tub and near the toilet and sinks. Use shower chairs and bath benches. Use a hand-held shower head that will allow you to sit while showering. Get into a tub or shower by putting the weaker leg in first. Get out of a tub or shower with your strong side first.  Repair loose toilet seats and consider installing a raised toilet seat to make getting on and off the toilet easier. Keep your washroom door unlocked while you are in the shower.     Follow-up:  Trauma Clinic: [phone] option [address]  [place], [address] [place]    Follow up:   [address]  5 [facility] [id number]  Schedule an appointment as soon as possible for a visit in 1 week(s)  For follow up    Chelsea Beaulieu [facility] [id number]    Call  As needed    Sara Barber MD  [address] [place] [address]  [phone]    Schedule an appointment as soon as possible for a visit in 3 week(s)  For follow up    Kale Camejo DO  [address] [facility],Fourth Floor [address] [place] [address]  [phone]    Schedule an appointment as soon as possible for a visit in 2 week(s)  For follow up       Time spent on discharge: more than 30 minutes  Signed:  ANGELIQUE Shah NP  10/13/2022  10:55 AM

## 2022-10-11 PROBLEM — O22.30 DVT (DEEP VEIN THROMBOSIS) IN PREGNANCY: Status: ACTIVE | Noted: 2022-10-11

## 2022-10-11 LAB
ANION GAP SERPL CALCULATED.3IONS-SCNC: 13 MMOL/L (ref 7–16)
BASOPHILS ABSOLUTE: 0.03 E9/L (ref 0–0.2)
BASOPHILS RELATIVE PERCENT: 0.4 % (ref 0–2)
BUN BLDV-MCNC: 20 MG/DL (ref 6–23)
CALCIUM SERPL-MCNC: 8.8 MG/DL (ref 8.6–10.2)
CHLORIDE BLD-SCNC: 105 MMOL/L (ref 98–107)
CO2: 23 MMOL/L (ref 22–29)
CREAT SERPL-MCNC: 1.4 MG/DL (ref 0.5–1)
EOSINOPHILS ABSOLUTE: 0.24 E9/L (ref 0.05–0.5)
EOSINOPHILS RELATIVE PERCENT: 3.2 % (ref 0–6)
GFR AFRICAN AMERICAN: 44
GFR NON-AFRICAN AMERICAN: 36 ML/MIN/1.73
GLUCOSE BLD-MCNC: 127 MG/DL (ref 74–99)
HCT VFR BLD CALC: 32.5 % (ref 34–48)
HEMOGLOBIN: 10.3 G/DL (ref 11.5–15.5)
IMMATURE GRANULOCYTES #: 0.03 E9/L
IMMATURE GRANULOCYTES %: 0.4 % (ref 0–5)
LYMPHOCYTES ABSOLUTE: 1.65 E9/L (ref 1.5–4)
LYMPHOCYTES RELATIVE PERCENT: 21.9 % (ref 20–42)
MCH RBC QN AUTO: 29.3 PG (ref 26–35)
MCHC RBC AUTO-ENTMCNC: 31.7 % (ref 32–34.5)
MCV RBC AUTO: 92.3 FL (ref 80–99.9)
MONOCYTES ABSOLUTE: 0.63 E9/L (ref 0.1–0.95)
MONOCYTES RELATIVE PERCENT: 8.4 % (ref 2–12)
NEUTROPHILS ABSOLUTE: 4.95 E9/L (ref 1.8–7.3)
NEUTROPHILS RELATIVE PERCENT: 65.7 % (ref 43–80)
PDW BLD-RTO: 14.8 FL (ref 11.5–15)
PLATELET # BLD: 158 E9/L (ref 130–450)
PMV BLD AUTO: 10 FL (ref 7–12)
POTASSIUM REFLEX MAGNESIUM: 4.1 MMOL/L (ref 3.5–5)
RBC # BLD: 3.52 E12/L (ref 3.5–5.5)
SODIUM BLD-SCNC: 141 MMOL/L (ref 132–146)
WBC # BLD: 7.5 E9/L (ref 4.5–11.5)

## 2022-10-11 PROCEDURE — 36415 COLL VENOUS BLD VENIPUNCTURE: CPT

## 2022-10-11 PROCEDURE — 6370000000 HC RX 637 (ALT 250 FOR IP): Performed by: CLINICAL NURSE SPECIALIST

## 2022-10-11 PROCEDURE — 80048 BASIC METABOLIC PNL TOTAL CA: CPT

## 2022-10-11 PROCEDURE — 97161 PT EVAL LOW COMPLEX 20 MIN: CPT

## 2022-10-11 PROCEDURE — 6370000000 HC RX 637 (ALT 250 FOR IP)

## 2022-10-11 PROCEDURE — 2140000000 HC CCU INTERMEDIATE R&B

## 2022-10-11 PROCEDURE — 85025 COMPLETE CBC W/AUTO DIFF WBC: CPT

## 2022-10-11 PROCEDURE — 99232 SBSQ HOSP IP/OBS MODERATE 35: CPT | Performed by: SURGERY

## 2022-10-11 PROCEDURE — 6370000000 HC RX 637 (ALT 250 FOR IP): Performed by: INTERNAL MEDICINE

## 2022-10-11 PROCEDURE — 97530 THERAPEUTIC ACTIVITIES: CPT

## 2022-10-11 PROCEDURE — 51798 US URINE CAPACITY MEASURE: CPT

## 2022-10-11 PROCEDURE — 97165 OT EVAL LOW COMPLEX 30 MIN: CPT

## 2022-10-11 RX ORDER — TAMSULOSIN HYDROCHLORIDE 0.4 MG/1
0.4 CAPSULE ORAL DAILY
Status: DISCONTINUED | OUTPATIENT
Start: 2022-10-11 | End: 2022-10-13 | Stop reason: HOSPADM

## 2022-10-11 RX ADMIN — METOPROLOL SUCCINATE 50 MG: 50 TABLET, EXTENDED RELEASE ORAL at 09:18

## 2022-10-11 RX ADMIN — SENNOSIDES AND DOCUSATE SODIUM 1 TABLET: 50; 8.6 TABLET ORAL at 09:16

## 2022-10-11 RX ADMIN — ISOSORBIDE MONONITRATE 30 MG: 30 TABLET, EXTENDED RELEASE ORAL at 09:16

## 2022-10-11 RX ADMIN — CLOPIDOGREL BISULFATE 75 MG: 75 TABLET ORAL at 09:15

## 2022-10-11 RX ADMIN — METHOCARBAMOL TABLETS 500 MG: 500 TABLET, COATED ORAL at 13:44

## 2022-10-11 RX ADMIN — SENNOSIDES AND DOCUSATE SODIUM 1 TABLET: 50; 8.6 TABLET ORAL at 21:02

## 2022-10-11 RX ADMIN — APIXABAN 5 MG: 5 TABLET, FILM COATED ORAL at 21:02

## 2022-10-11 RX ADMIN — METHOCARBAMOL TABLETS 500 MG: 500 TABLET, COATED ORAL at 17:17

## 2022-10-11 RX ADMIN — METHOCARBAMOL TABLETS 500 MG: 500 TABLET, COATED ORAL at 21:02

## 2022-10-11 RX ADMIN — ACETAMINOPHEN 650 MG: 325 TABLET, FILM COATED ORAL at 09:15

## 2022-10-11 RX ADMIN — ATORVASTATIN CALCIUM 40 MG: 40 TABLET, FILM COATED ORAL at 21:02

## 2022-10-11 RX ADMIN — HYDROCHLOROTHIAZIDE 25 MG: 25 TABLET ORAL at 09:16

## 2022-10-11 RX ADMIN — METHOCARBAMOL TABLETS 500 MG: 500 TABLET, COATED ORAL at 09:16

## 2022-10-11 RX ADMIN — POLYETHYLENE GLYCOL 3350 17 G: 17 POWDER, FOR SOLUTION ORAL at 09:18

## 2022-10-11 RX ADMIN — TAMSULOSIN HYDROCHLORIDE 0.4 MG: 0.4 CAPSULE ORAL at 13:44

## 2022-10-11 RX ADMIN — APIXABAN 5 MG: 5 TABLET, FILM COATED ORAL at 09:16

## 2022-10-11 RX ADMIN — ACETAMINOPHEN 650 MG: 325 TABLET, FILM COATED ORAL at 21:02

## 2022-10-11 RX ADMIN — ACETAMINOPHEN 650 MG: 325 TABLET, FILM COATED ORAL at 13:44

## 2022-10-11 ASSESSMENT — PAIN DESCRIPTION - LOCATION: LOCATION: GENERALIZED;RIB CAGE

## 2022-10-11 ASSESSMENT — PAIN SCALES - GENERAL
PAINLEVEL_OUTOF10: 4
PAINLEVEL_OUTOF10: 0

## 2022-10-11 ASSESSMENT — PAIN DESCRIPTION - DESCRIPTORS: DESCRIPTORS: ACHING;DISCOMFORT

## 2022-10-11 NOTE — PROGRESS NOTES
Physical Therapy  Physical Therapy Initial Assessment     Name: Luca Casas  : 1945  MRN: [id number]      Date of Service: 10/11/2022    Evaluating PT:  Philip Kat PT, DPT [id number]    Room #:  3164/3805-V  Diagnosis:  Trauma Jennifer Loaiza  Fall, initial encounter [W19. XXXA]  Closed fracture of multiple ribs of left side, initial encounter [S22.42XA]  DVT (deep vein thrombosis) in pregnancy [O22.30]  PMHx/PSHx:  HLD, HTN, thyroid disease  Procedure/Surgery:  none this admission  Precautions:  Falls, L rib fxs  Equipment Needs:  potential need for FWW if home DC    SUBJECTIVE:    Pt lives alone in a 1 story home with level entry. Bed is on 1st floor and bath is on 1st floor. Pt ambulated with no AD PTA. Pt reports that her daughter visits almost daily. OBJECTIVE:   Initial Evaluation  Date: 10/11/22 Treatment Short Term/ Long Term   Goals   AM-PAC 6 Clicks 97/51     Was pt agreeable to Eval/treatment? yes     Does pt have pain? L rib pain, LLE pain d/t DVT     Bed Mobility  Rolling: Del[place]  Supine to sit: Delmer  Sit to supine: Delmer  Scooting: Delmer  Rolling: Independent   Supine to sit: Independent   Sit to supine: Independent   Scooting: Independent    Transfers Sit to stand: Delmer  Stand to sit: Delmer  Stand pivot: Delmer  Sit to stand: Independent   Stand to sit: Independent   Stand pivot: Mod I with AAD   Ambulation    5 feet x2 with [employer] Del[place]  50 feet with AAD Mod I    Stair negotiation: ascended and descended  NT  TBD   ROM BUE:  Defer to OT note  BLE:  WFL     Strength BUE:  Defer to OT note  BLE:  grossly 3/5  WNL   Balance Sitting EOB:  SBA  Dynamic Standing:  Delmer with [employer]  Sitting EOB:  Independent   Dynamic Standing:   Mod I with [employer]     Pt is A & O x 4  Sensation:  NT  Edema:  LLE swelling    Patient education  Pt educated on role of PT, safety during mobility    Patient response to education:   Pt verbalized understanding Pt demonstrated skill Pt requires further education in this area   yes yes yes ASSESSMENT:    Conditions Requiring Skilled Therapeutic Intervention:    [x]Decreased strength     [x]Decreased ROM  [x]Decreased functional mobility  [x]Decreased balance   [x]Decreased endurance   []Decreased posture  []Decreased sensation  []Decreased coordination   []Decreased vision  []Decreased safety awareness   [x]Increased pain       Comments:  patient semi-supine in bed upon entry and agreeable to PT evaluation. Pt mildly distracted during session requiring redirection. Pt able to complete bed mobility with light assist to trunk. Pt able to stand and ambulate short distance in room with heavy use of UE support on WW to offload LLE d/t pain. Seated rest completed between bouts. Pt reported spending time in chair and requested to return to bed at end of session. Assist to BLEs to return to bed at end of session with all needs met. Patient to benefit from continued skilled PT at [facility] to improve functional mobility and decrease fall risk. Treatment:  Patient practiced and was instructed in the following treatment:    Bed Mobility: VCs provided for sequencing and safety during mobility. Manual assist provided for completion of task. Transfer Training: Verbal and tactile cueing provided for sequencing and safety during mobility. Manual assist provided for completion of task  Gait Training: Ambulation with WW and verbal cues for proper technique and safety. Manual assist provided for completion of task     Pt's/ family goals   1. None stated    Prognosis is good for reaching above PT goals. Patient and or family understand(s) diagnosis, prognosis, and plan of care.   yes    PHYSICAL THERAPY PLAN OF CARE:    PT POC is established based on physician order and patient diagnosis     Referring provider/PT Order:    10/10/22 1430  PT evaluation and treat  Start:  10/10/22 1430,   End:  10/10/22 1430,   ONE TIME,   Standing Count:  1 Occurrences,   Claritza Kim MD      Diagnosis:  Trauma Melvina Back  Fall, initial encounter [id number]. XXXA]  Closed fracture of multiple ribs of left side, initial encounter [S22.42XA]  DVT (deep vein thrombosis) in pregnancy [O22.30]  Specific instructions for next treatment:  progress mobility as appropriate    Current Treatment Recommendations:     [x] Strengthening to improve independence with functional mobility   [x] ROM to improve independence with functional mobility   [x] Balance Training to improve static/dynamic balance and to reduce fall risk  [x] Endurance Training to improve activity tolerance during functional mobility   [x] Transfer Training to improve safety and independence with all functional transfers   [x] Gait Training to improve gait mechanics, endurance and assess need for appropriate assistive device  [] Stair Training in preparation for safe discharge home and/or into the community   [] Positioning to prevent skin breakdown and contractures  [x] Safety and Education Training   [x] Patient/Caregiver Education   [] HEP  [x] Other     PT long term treatment goals are located in above grid    Frequency of treatments: 2-5x/week x 1-2 weeks. Time in  1520  Time out  1535    Total Treatment Time  0 minutes     Evaluation Time includes thorough review of current medical information, gathering information on past medical history/social history and prior level of function, completion of standardized testing/informal observation of tasks, assessment of data and education on plan of care and goals.     CPT codes:  [x] Low Complexity PT evaluation 43443  [] Moderate Complexity PT evaluation 32796  [] High Complexity PT evaluation 16793  [] PT Re-evaluation 96753  [] Gait training 01276 - minutes  [] Manual therapy 61822 - minutes  [] Therapeutic activities 78412 - minutes  [] Therapeutic exercises 78940 - minutes  [] Neuromuscular reeducation 07702 - minutes     Lucrecia Ventura PT, DPT  [id number]

## 2022-10-11 NOTE — CONSULTS
[facility]  Hematology/Oncology  Consult      Patient Name: Barrera Mcdermott  YOB: 1945  PCP: Deloria Hatchet, DO   Referring Provider:      Reason for Consultation:   Chief Complaint   Patient presents with    Fall     PT fell ( leg gave out) c/o rib pain left sided, ble edema, left wrist abrasion        History of Present Illness: This is a 57-year-old female patient with a PMH of HTN, HTN, CAD s/p PCI, COVID in December 2021, and Afib on Eliquis who presented to the ED for evaluation after a fall with post fall chest pain. CT chest with age-indeterminate fractures of the 2nd, 4th-th ribs anterior laterally. CT Head with no acute abnormality. BL LE dopplers positive for nonocclusive DVT L posterior tibial vein. No DVT in R LE.  CMP with Cr 1.4, BUN 20, GFR 36. CBC with normal platelets and WBC. Normocytic anemia hgb 10.3. Cardiology consulted for evaluation of anticoagulation recommendations. It was recommended to consult heme/onc due to Eliquis failure.         Diagnostic Data:     Past Medical History:   Diagnosis Date    Hyperlipidemia     Hypertension     Thyroid disease        Patient Active Problem List    Diagnosis Date Noted    Trauma 10/10/2022    Acute deep vein thrombosis (DVT) of tibial vein of left lower extremity ([address] 10/10/2022    Closed fracture of multiple ribs of left side 10/10/2022    Anticoagulation management encounter 10/10/2022    Fall 10/10/2022    Coronary artery disease involving native coronary artery of native heart with angina pectoris ([address] 10/10/2022    Longstanding persistent atrial fibrillation ([address] 10/10/2022    Shortness of breath     Metabolic acidosis 84/74/6787    Allergy to multiple drugs 05/06/2021    Hypothyroid 05/06/2021    Grade I diastolic dysfunction 07/02/9949    HLD (hyperlipidemia) 05/06/2021    NSTEMI (non-ST elevated myocardial infarction) ([address] 05/06/2021    Chest pain 05/05/2021    Edema of both feet 09/05/2018    Non-rheumatic mitral regurgitation 11/02/2017    Pericardial effusion 11/02/2017    Obesity (BMI 35.0-39.9 without comorbidity) 11/02/2017    Aspirin allergy 02/24/2017    Allergy to statin medication 02/24/2017    CKD (chronic kidney disease) 01/20/2017    Heart murmur 01/20/2017    Chest pain, atypical 01/20/2017    Essential hypertension 01/20/2017        Past Surgical History:   Procedure Laterality Date    CARPAL TUNNEL RELEASE      CATARACT REMOVAL      CHOLECYSTECTOMY      CORONARY ANGIOPLASTY WITH STENT PLACEMENT  05/07/2021    Dr Mandy Huitron LAD    FRACTURE SURGERY      HYSTERECTOMY (CERVIX STATUS UNKNOWN)      SKIN CANCER EXCISION      rt arm       Family History  No family history on file. Social History    TOBACCO:   reports that she has never smoked. She has never used smokeless tobacco.  ETOH:   reports no history of alcohol use. Home Medications  Prior to Admission medications    Medication Sig Start Date End Date Taking?  Authorizing Provider   hydroCHLOROthiazide (HYDRODIURIL) 25 MG tablet Take 25 mg by mouth daily 5/31/22   Historical Provider, MD   clopidogrel (PLAVIX) 75 MG tablet Take 1 tablet by mouth daily 5/4/22   Mikhail Kothari MD   apixaban (ELIQUIS) 5 MG TABS tablet Take 1 tablet by mouth 2 times daily 5/4/22   Mikhail Kothari MD   metoprolol succinate (TOPROL XL) 50 MG extended release tablet Take 1 tablet by mouth daily 5/9/21   Jose Edgar MD   isosorbide mononitrate (IMDUR) 30 MG extended release tablet Take 1 tablet by mouth daily 5/9/21   Jose Edgar MD   atorvastatin (LIPITOR) 40 MG tablet Take 1 tablet by mouth nightly 5/8/21   Jose Edgar MD   amLODIPine (NORVASC) 10 MG tablet Take 10 mg by mouth daily    Historical Provider, MD       Allergies  Allergies   Allergen Reactions    Latex     Aspirin     Andrés-Allergin [Diphenhydramine]     Darvocet A500 [Propoxyphene N-Acetaminophen]     Fish-Derived Products     Honey Bee Venom [Bee Venom]     Lemon Oil     Niacin And Related     Nuts [Peanut Oil]     Penicillins     Shellfish-Derived Products     Wine [Alcohol]      Beer, wine and diet pop       Review of Systems: Fatigue, weakness, L sided rib pain. Objective  BP (!) 140/78   Pulse 89   Temp 97.9 °F (36.6 °C) (Oral)   Resp 18   Ht 5' 4.8\" (1.646 m)   Wt 199 lb 14.4 oz (90.7 kg)   SpO2 95%   BMI 33.47 kg/m²     Physical Exam:     General: AAO to person, place, time, in no acute distress,   Head and neck : PERRLA, EOMI . Sclera non icteric. Oropharynx : Clear  Neck: no JVD,  no adenopathy  Heart: Irregular  Lungs: Clear to auscultation   Extremities: L leg edema,no cyanosis, no clubbing. Abdomen: Soft, non-tender;no masses, no organomegaly  Skin:  No rash. Neurologic:Cranial nerves grossly intact. No focal motor or sensory deficits .     Recent Laboratory Data-   Lab Results   Component Value Date    WBC 7.5 10/11/2022    HGB 10.3 (L) 10/11/2022    HCT 32.5 (L) 10/11/2022    MCV 92.3 10/11/2022     10/11/2022    LYMPHOPCT 21.9 10/11/2022    RBC 3.52 10/11/2022    MCH 29.3 10/11/2022    MCHC 31.7 (L) 10/11/2022    RDW 14.8 10/11/2022    NEUTOPHILPCT 65.7 10/11/2022    MONOPCT 8.4 10/11/2022    BASOPCT 0.4 10/11/2022    NEUTROABS 4.95 10/11/2022    LYMPHSABS 1.65 10/11/2022    MONOSABS 0.63 10/11/2022    EOSABS 0.24 10/11/2022    BASOSABS 0.03 10/11/2022       Lab Results   Component Value Date     10/11/2022    K 4.1 10/11/2022     10/11/2022    CO2 23 10/11/2022    BUN 20 10/11/2022    CREATININE 1.4 (H) 10/11/2022    GLUCOSE 127 (H) 10/11/2022    CALCIUM 8.8 10/11/2022    PROT 6.8 10/10/2022    LABALBU 3.9 10/10/2022    BILITOT 0.8 10/10/2022    ALKPHOS 71 10/10/2022    AST 10 10/10/2022    ALT 9 10/10/2022    LABGLOM 36 10/11/2022    GFRAA 44 10/11/2022       No results found for: IRON, TIBC, FERRITIN        Radiology-    XR RIBS LEFT INCLUDE CHEST (MIN 3 VIEWS)    Result Date: 10/10/2022  EXAMINATION: XRAY VIEWS OF THE LEFT RIBS WITH FRONTAL XRAY VIEW OF THE CHEST 10/10/2022 1:02 am COMPARISON: None. HISTORY: ORDERING SYSTEM PROVIDED HISTORY: fall TECHNOLOGIST PROVIDED HISTORY: Reason for exam:->fall What reading provider will be dictating this exam?->CRC FINDINGS: The lungs are without acute focal process. There is no effusion or pneumothorax. Cardiomegaly. The osseous structures are without acute process. No acute process. XR PELVIS (1-2 VIEWS)    Result Date: 10/10/2022  EXAMINATION: ONE XRAY VIEW OF THE PELVIS 10/10/2022 7:01 am COMPARISON: None. HISTORY: ORDERING SYSTEM PROVIDED HISTORY: trauma TECHNOLOGIST PROVIDED HISTORY: Reason for exam:->trauma What reading provider will be dictating this exam?->CRC FINDINGS: Imaging of the pelvis reveals minimal degenerative changes seen within the sacroiliac joints bilaterally and pubic symphysis. Cortex is intact. Joint spaces are preserved. No acute bony abnormality. Phleboliths seen on both sides of the pelvis. No acute bony abnormality. XR WRIST LEFT (MIN 3 VIEWS)    Result Date: 10/10/2022  EXAMINATION: XRAY VIEWS OF THE LEFT WRIST 10/10/2022 1:02 am COMPARISON: None. HISTORY: ORDERING SYSTEM PROVIDED HISTORY: fall TECHNOLOGIST PROVIDED HISTORY: Reason for exam:->fall What reading provider will be dictating this exam?->CRC FINDINGS: No acute fracture or dislocation is identified. No destructive osseous lesion is visualized. The joint spaces are preserved. No acute findings. XR KNEE LEFT (1-2 VIEWS)    Result Date: 10/10/2022  EXAMINATION: TWO XRAY VIEWS OF THE LEFT KNEE 10/10/2022 1:02 am COMPARISON: None. HISTORY: ORDERING SYSTEM PROVIDED HISTORY: eval pain TECHNOLOGIST PROVIDED HISTORY: Reason for exam:->eval pain What reading provider will be dictating this exam?->CRC FINDINGS: Narrowing of the medial compartment. Lateral femoral condyle marginal osteophyte. No evidence of acute fracture or dislocation. No focal osseous lesion. No evidence of joint effusion.  No focal soft tissue abnormality. No acute abnormality of the knee. XR KNEE RIGHT (1-2 VIEWS)    Result Date: 10/10/2022  EXAMINATION: TWO XRAY VIEWS OF THE RIGHT KNEE 10/10/2022 1:02 am COMPARISON: None. HISTORY: ORDERING SYSTEM PROVIDED HISTORY: eval swelling TECHNOLOGIST PROVIDED HISTORY: Reason for exam:->eval swelling What reading provider will be dictating this exam?->CRC FINDINGS: No evidence of acute fracture or dislocation. No focal osseous lesion. No evidence of joint effusion. No focal soft tissue abnormality. Narrowing of the medial compartment with associated marginal osteophytes on both sides of the joint space. Superior and inferior patellar spurs. No acute abnormality of the knee. XR TIBIA FIBULA LEFT (2 VIEWS)    Result Date: 10/10/2022  EXAMINATION: XRAY VIEWS OF THE LEFT TIBIA AND FIBULA 10/10/2022 1:02 am COMPARISON: None. HISTORY: ORDERING SYSTEM PROVIDED HISTORY: fall TECHNOLOGIST PROVIDED HISTORY: Reason for exam:->fall What reading provider will be dictating this exam?->CRC FINDINGS: There is no evidence of acute fracture. There is normal alignment. No acute joint abnormality. Dorsal and plantar calcaneal spurs. Diffuse soft tissue edema. Narrowing of the medial compartment of the knee. No acute osseous abnormality. XR ANKLE LEFT (MIN 3 VIEWS)    Result Date: 10/10/2022  EXAMINATION: THREE XRAY VIEWS OF THE LEFT ANKLE 10/10/2022 1:01 am COMPARISON: None. HISTORY: ORDERING SYSTEM PROVIDED HISTORY: fall, pain TECHNOLOGIST PROVIDED HISTORY: Reason for exam:->fall, pain What reading provider will be dictating this exam?->CRC FINDINGS: No evidence of acute fracture or dislocation. Normal alignment of the ankle mortise. Dorsal and plantar calcaneal spurs. No evidence of joint effusion. Lateral ankle edema. No acute osseous abnormality of the ankle. Lateral ankle edema.      CT HEAD WO CONTRAST    Result Date: 10/10/2022  EXAMINATION: CT OF THE HEAD WITHOUT CONTRAST  10/10/2022 2:02 am TECHNIQUE: CT of the head was performed without the administration of intravenous contrast. Automated exposure control, iterative reconstruction, and/or weight based adjustment of the mA/kV was utilized to reduce the radiation dose to as low as reasonably achievable. COMPARISON: None. HISTORY: ORDERING SYSTEM PROVIDED HISTORY: fall TECHNOLOGIST PROVIDED HISTORY: Reason for exam:->fall Has a \"code stroke\" or \"stroke alert\" been called? ->No Decision Support Exception - unselect if not a suspected or confirmed emergency medical condition->Emergency Medical Condition (MA) What reading provider will be dictating this exam?->CRC FINDINGS: BRAIN/VENTRICLES: There is no acute intracranial hemorrhage, mass effect or midline shift. No abnormal extra-axial fluid collection. The gray-white differentiation is maintained without evidence of an acute infarct. There is no evidence of hydrocephalus. ORBITS: The visualized portion of the orbits demonstrate no acute abnormality. SINUSES: The visualized paranasal sinuses and mastoid air cells demonstrate no acute abnormality. SOFT TISSUES/SKULL:  No acute abnormality of the visualized skull or soft tissues. No acute intracranial abnormality. CT CHEST WO CONTRAST    Result Date: 10/10/2022  EXAMINATION: CT OF THE CHEST WITHOUT CONTRAST 10/10/2022 2:02 am TECHNIQUE: CT of the chest was performed without the administration of intravenous contrast. Multiplanar reformatted images are provided for review. Automated exposure control, iterative reconstruction, and/or weight based adjustment of the mA/kV was utilized to reduce the radiation dose to as low as reasonably achievable. COMPARISON: None.  HISTORY: ORDERING SYSTEM PROVIDED HISTORY: fall, left rib pain TECHNOLOGIST PROVIDED HISTORY: Reason for exam:->fall, left rib pain Decision Support Exception - unselect if not a suspected or confirmed emergency medical condition->Emergency Medical Condition (MA) What reading provider will be dictating this exam?->CRC FINDINGS: Aorta: No evidence of thoracic aortic aneurysm or dissection. No acute abnormality of the aorta. Mediastinum: No evidence of mediastinal lymphadenopathy. The heart and pericardium demonstrate no acute abnormality. Lungs/Pleura: The lungs are without acute process. No focal consolidation or pulmonary edema. No evidence of pleural effusion or pneumothorax. Upper Abdomen: Limited images of the upper abdomen are unremarkable. There are age indeterminate fractures of the left 2nd, 4th-7th ribs anterior laterally. Age-indeterminate fractures of the left 2nd, 4th through 7th ribs anterior laterally     CT CERVICAL SPINE WO CONTRAST    Result Date: 10/10/2022  EXAMINATION: CT OF THE CERVICAL SPINE WITHOUT CONTRAST 10/10/2022 2:02 am TECHNIQUE: CT of the cervical spine was performed without the administration of intravenous contrast. Multiplanar reformatted images are provided for review. Automated exposure control, iterative reconstruction, and/or weight based adjustment of the mA/kV was utilized to reduce the radiation dose to as low as reasonably achievable. COMPARISON: None. HISTORY: ORDERING SYSTEM PROVIDED HISTORY: fall TECHNOLOGIST PROVIDED HISTORY: Reason for exam:->fall Decision Support Exception - unselect if not a suspected or confirmed emergency medical condition->Emergency Medical Condition (MA) What reading provider will be dictating this exam?->CRC FINDINGS: BONES/ALIGNMENT: There is no acute fracture or traumatic malalignment. DEGENERATIVE CHANGES: There is degenerative disc disease and mild facet arthropathy. There is moderate spinal canal stenosis C5-C6 SOFT TISSUES: There is no prevertebral soft tissue swelling. No acute abnormality of the cervical spine. Multilevel degenerative change with moderate spinal canal stenosis at C5-C6.      US DUP LOWER EXTREMITIES BILATERAL VENOUS    Result Date: 10/10/2022  EXAMINATION: DUPLEX VENOUS ULTRASOUND OF THE BILATERAL LOWER [id number]/10/2022 9:04 am TECHNIQUE: Duplex ultrasound using B-mode/gray scaled imaging, Doppler spectral analysis and color flow Doppler was obtained of the deep venous structures of the lower bilateral extremities. COMPARISON: None. HISTORY: ORDERING SYSTEM PROVIDED HISTORY: LLE Swelling pain TECHNOLOGIST PROVIDED HISTORY: Reason for exam:->LLE Swelling pain What reading provider will be dictating this exam?->CRC FINDINGS: RIGHT LOWER EXTREMITY: There is normal compressibility, phase flow pattern and augmentation demonstration for the right common femoral, superficial femoral and popliteal veins. There are patency of the deep veins in the right calf area. There are patency of the distal right external iliac vein and the junction between the great saphenous vein with right common femoral vein. No evidence for DVT in the right lower extremity. LEFT LOWER EXTREMITY: There is lack of proper compressibility in the left posterior tibial vein which indicate the presence of a nonocclusive DVT in the left calf area. There is normal compressibility, phase flow pattern and augmentation demonstration for the left common femoral, superficial femoral and popliteal vein. There are patency of the distal left external iliac vein and the junction between the great saphenous vein with the left common femoral vein. Positive nonocclusive DVT affecting the left posterior tibial vein. RECOMMENDATIONS: Unavailable         ASSESSMENT/PLAN :  68-year-old female   - HTN, HTN, CAD s/p PCI, COVID in December 2021, and Afib on Eliquis   - DVT while on Eliquis    - Fall with post fall chest pain. - CT chest with age-indeterminate fractures of the 2nd, 4th-th ribs anterior laterally. - CT Head with no acute abnormality.    - BL LE dopplers positive for nonocclusive DVT L posterior tibial vein. No DVT in R LE.    - CMP with Cr 1.4, BUN 20, GFR 36. CBC with normal platelets and WBC.  Normocytic anemia hgb 10.3.   - Cardiology consulted for evaluation of anticoagulation recommendations. Patient states that she has been very compliant with Eliquis and has not missed doses     Her options include switching to an alternative DOAC such as Xarelto or Pradaxa. If she fails above  then she will go on warfarin. The fact that she is on concurrent antiplatelet therapy with Plavix for her coronary stent she will be at high risk of bleeding. She will be transitioned tomorrow to Xarelto at a dose of 20 mg daily for 3 months following which her Xarelto dose to be decreased to 50 mg daily maintenance for nonvalvular atrial fibrillation and that would be adjusted to her GFR of 36 mL/min      ANGELIQUE Nolan - CNP  Electronically signed 10/11/2022 at 8:22 AM  Patient seen and examined, note edited to reflect above.   Saniya Francois MD

## 2022-10-11 NOTE — PROGRESS NOTES
Pt unable to void on bed pan after multiple attempts. Bladder scan volume was >781. Attending notified and a one time order for straight cath placed.

## 2022-10-11 NOTE — PROGRESS NOTES
[facility]  ATTENDING PHYSICIAN PROGRESS NOTE      I personally saw, examined and provided care for the patient. Radiographs, labs and medications were reviewed by me independently. The case was discussed in detail and plans for care were established. Review of Residents documentation was conducted and revisions were made as appropriate. I agree with the above documented exam, problem list and plan of care. The following summarizes my clinical findings and independent assessment. CC: Fall    S. Patient had Borges inserted last night for urinary retention. He was found to have a left lower extremity DVT while on Eliquis    O. Awake and alert x3  No apparent distress  Lungs clear, left chest wall tenderness  Abdomen soft nontender nondistended  Left lower extremity more swollen than right    ASSESSMENT:  Principal Problem:    Trauma  Active Problems:    Acute deep vein thrombosis (DVT) of tibial vein of left lower extremity (HCC)    Closed fracture of multiple ribs of left side    Anticoagulation management encounter    Fall    Coronary artery disease involving native coronary artery of native heart with angina pectoris (HCC)    Longstanding persistent atrial fibrillation ([facility] [address]    DVT (deep vein thrombosis) in pregnancy    Obesity (BMI 35.0-39.9 without comorbidity)  Resolved Problems:    * No resolved hospital problems. *       PLAN:  Multiple left-sided chest wall rib fractures-continue aggressive pain control and pulmonary toilet    Acute DVT of left lower extremity present admission while on Eliquis-heme-onc has been consulted. Await recommendations    General diet    Acute urinary retention-Borges inserted    DVT Proph: SCDS/Eliquis  PT OT    Refer to discharge summary as part of the discharge planning. Fausto Heart MD, FACS  10/11/2022  4:14 PM    NOTE: This report was transcribed using voice recognition software.  Every effort was made to ensure accuracy; however, inadvertent computerized transcription errors may be present.

## 2022-10-11 NOTE — PROGRESS NOTES
Occupational Therapy  OCCUPATIONAL THERAPY INITIAL EVALUATION    [facility] [address] [address] [address], [address] [place] [address] [address]      [id number]                                                Patient Name: Erin Banuelos  MRN: [id number]  : 1945  Room: [address]    Referring Provider: Sherry Monterroso MD  Specific Provider Orders/Date: OT eval and treat 10/10/22    Diagnosis: Trauma Tad [place]  Fall, initial encounter [W19. XXXA]  Closed fracture of multiple ribs of left side, initial encounter [S22.42XA]  DVT (deep vein thrombosis) in pregnancy [O22.30]   Pt admitted to hospital on 10/9/22 following fall. L wrist abrasion, L rib fx's, B LE edema, L LE DVT    Pertinent Medical History:  has a past medical history of Hyperlipidemia, Hypertension, and Thyroid disease.        Precautions:  Fall Risk, L rib fx's (per imaging, age indeterminate), L LE DVT (+anticoagulation), bed/chair alarm    Assessment of current deficits    [x] Functional mobility  [x]ADLs  [x] Strength               []Cognition    [x] Functional transfers   [x] IADLs         [x] Safety Awareness   [x]Endurance    [] Fine Coordination              [x] Balance      [] Vision/perception   []Sensation     []Gross Motor Coordination  [] ROM  [] Delirium                   [] Motor Control     OT PLAN OF CARE   OT POC based on physician orders, patient diagnosis and results of clinical assessment    Frequency/Duration 1-3 days/wk for 2 weeks PRN   Specific OT Treatment Interventions to include:   * Instruction/training on adapted ADL techniques and AE recommendations to increase functional independence within precautions       * Training on energy conservation strategies, correct breathing pattern and techniques to improve independence/tolerance for self-care routine  * Functional transfer/mobility training/DME recommendations for increased independence, safety, and fall prevention  * Patient/Family education to increase follow through with safety techniques and functional independence  * Recommendation of environmental modifications for increased safety with functional transfers/mobility and ADLs  * Cognitive retraining/development of therapeutic activities to improve problem solving, judgement, memory, and attention for increased safety/participation in ADL/IADL tasks  * Therapeutic exercise to improve motor endurance, ROM, and functional strength for ADLs/functional transfers  * Therapeutic activities to facilitate/challenge dynamic balance, stand tolerance for increased safety and independence with ADLs  * Therapeutic activities to facilitate gross/fine motor skills for increased independence with ADLs      Recommended Adaptive Equipment: w/w, AE for LB self-care tasks, TBD for additional AE    Home Living: Pt lives alone in 1 floor home. 0 EKTA    Bathroom setup: walk in shower   Equipment owned: w/w, cane, shower chair    Prior Level of Function: independent with ADLs , independent with IADLs; ambulated independently w/o AD   Driving: yes  Enjoys watching grandchildren and playing bingo    Pain Level: Pt c/o intermittent L rib pain this session ; reinforced pain management/protection strategies including splinting ribs    Cognition: A&O: 3/4 (mild situational confusion noted);  Follows 1 step directions  Pt is very easily distracted - frequent cues provided to focus on and to initiate tasks  Decreased insight to deficits   Memory:  fair   Sequencing:  fair    Problem solving:  fair    Judgement/safety:  fair      Functional Assessment:  AM-PAC Daily Activity Raw Score: 16/24   Initial Eval Status  Date: 10/11/22 Treatment Status  Date: STGs = LTGs  Time frame: 10-14 days   Feeding Independent   -   Grooming Minimal Assist   Seated EOB  Modified [place]    UB Dressing Minimal Assist   Modified [place]    LB Dressing Maximal Assist   Socks  Supervision    Bathing Moderate Assist  Supervision    Toileting Moderate Assist   Supervision    Bed Mobility  Supine to sit: Minimal Assist   Sit to supine: NT   Supine to sit: Modified [place]   Sit to supine: Modified [place]    Functional Transfers Sit><stand: Minimal Assist   Stand pivot (EOB>chair): Mod A w/o AD  Supervision    Functional Mobility Moderate Assist w/o AD    Min A w/ w/w  Increased time to advance L LE d/t pain  Supervision    Balance Sitting:     Static:  SBA    Dynamic:Min A  Standing: Mod A w/o AD  Min A w/ w/w     Activity Tolerance Fair-  Limited by pain  Good   Visual/  Perceptual Glasses: no                  Hand Dominance R   AROM (PROM) Strength Additional Info:    RUE  WFL 4/5 good  and wfl FMC/dexterity noted during ADL tasks       LUE WFL Deferred d/t L rib fx's good  and wfl FMC/dexterity noted during ADL tasks       Hearing: WFL   Sensation:  No c/o numbness or tingling   Tone: WFL   Edema: B LE's (L>R)    Comments: Upon arrival patient lying in bed. Therapist educated pt on role of OT. RN clearance. At end of session, patient seated in chair (chair alarm on) with call light and phone within reach, all lines and tubes intact. Overall patient demonstrated decreased independence and safety during completion of ADL/functional transfer/mobility tasks. Pt would benefit from continued skilled OT to increase safety and independence with completion of ADL/IADL tasks for functional independence and quality of life.     Treatment: OT treatment provided this date includes: Facilitation of bed mobility (education/cues for body mechanics, safety, L rib protection strategies/pain management), unsupported sitting balance (addressing posture, weight shifting, dynamic reaching to prep for ADL's), functional transfers (various surfaces w/ education/cues for safety/hand placement), standing tolerance tasks (addressing posture, balance and activity tolerance impacting ADLs and functional activity) and light functional ambulation tasks with w/w (Initially facilitated ~2 steps forward w/o AD - pt very unsteady, reached for environment and therapist. Safety maintained. Incorporated w/w for remainder of standing and ambulation tasks w/ education/cuing on posture, w/w management and safety). Therapist facilitated self-care retraining: LB self-care tasks and grooming tasks while educating/cuing pt on modified techniques, posture, safety and energy conservation techniques. Skilled monitoring of HR, O2 sats and pts response to treatment. Rehab Potential: Good for established goals     Patient / Family Goal: not stated      Patient and/or family were instructed on functional diagnosis, prognosis/goals and OT plan of care. Demonstrated fair understanding. Eval Complexity: Low    Time In: 11:53  Time Out: 12:17  Total Treatment Time: 12 minutes    Min Units   OT Eval Low 97165  X  1   OT Eval Medium 01499      OT Eval High 47338      OT Re-Eval [id number]       Therapeutic Ex 45177       Therapeutic Activities 26199  8  1   ADL/Self Care 89301  4  0   Orthotic Management 38097       Manual 50529     Neuro Re-Ed 57810       Non-Billable Time          Evaluation Time additionally includes thorough review of current medical information, gathering information on past medical history/social history and prior level of function, interpretation of standardized testing/informal observation of tasks, assessment of data and development of plan of care and goals.         ARMANDO GoldmanR/L #[id number]

## 2022-10-12 LAB
ANION GAP SERPL CALCULATED.3IONS-SCNC: 12 MMOL/L (ref 7–16)
BASOPHILS ABSOLUTE: 0.03 E9/L (ref 0–0.2)
BASOPHILS RELATIVE PERCENT: 0.4 % (ref 0–2)
BUN BLDV-MCNC: 26 MG/DL (ref 6–23)
CALCIUM SERPL-MCNC: 9.2 MG/DL (ref 8.6–10.2)
CHLORIDE BLD-SCNC: 103 MMOL/L (ref 98–107)
CO2: 22 MMOL/L (ref 22–29)
CREAT SERPL-MCNC: 1.4 MG/DL (ref 0.5–1)
EOSINOPHILS ABSOLUTE: 0.19 E9/L (ref 0.05–0.5)
EOSINOPHILS RELATIVE PERCENT: 2.8 % (ref 0–6)
GFR AFRICAN AMERICAN: 44
GFR NON-AFRICAN AMERICAN: 36 ML/MIN/1.73
GLUCOSE BLD-MCNC: 134 MG/DL (ref 74–99)
HCT VFR BLD CALC: 32.9 % (ref 34–48)
HEMOGLOBIN: 10.3 G/DL (ref 11.5–15.5)
IMMATURE GRANULOCYTES #: 0.01 E9/L
IMMATURE GRANULOCYTES %: 0.1 % (ref 0–5)
LYMPHOCYTES ABSOLUTE: 1.56 E9/L (ref 1.5–4)
LYMPHOCYTES RELATIVE PERCENT: 23.1 % (ref 20–42)
MCH RBC QN AUTO: 29.7 PG (ref 26–35)
MCHC RBC AUTO-ENTMCNC: 31.3 % (ref 32–34.5)
MCV RBC AUTO: 94.8 FL (ref 80–99.9)
MONOCYTES ABSOLUTE: 0.6 E9/L (ref 0.1–0.95)
MONOCYTES RELATIVE PERCENT: 8.9 % (ref 2–12)
NEUTROPHILS ABSOLUTE: 4.35 E9/L (ref 1.8–7.3)
NEUTROPHILS RELATIVE PERCENT: 64.7 % (ref 43–80)
PDW BLD-RTO: 14.7 FL (ref 11.5–15)
PLATELET # BLD: 162 E9/L (ref 130–450)
PMV BLD AUTO: 10.1 FL (ref 7–12)
POTASSIUM REFLEX MAGNESIUM: 4 MMOL/L (ref 3.5–5)
RBC # BLD: 3.47 E12/L (ref 3.5–5.5)
SODIUM BLD-SCNC: 137 MMOL/L (ref 132–146)
WBC # BLD: 6.7 E9/L (ref 4.5–11.5)

## 2022-10-12 PROCEDURE — 6370000000 HC RX 637 (ALT 250 FOR IP): Performed by: INTERNAL MEDICINE

## 2022-10-12 PROCEDURE — 6370000000 HC RX 637 (ALT 250 FOR IP)

## 2022-10-12 PROCEDURE — 97535 SELF CARE MNGMENT TRAINING: CPT

## 2022-10-12 PROCEDURE — 6370000000 HC RX 637 (ALT 250 FOR IP): Performed by: CLINICAL NURSE SPECIALIST

## 2022-10-12 PROCEDURE — 80048 BASIC METABOLIC PNL TOTAL CA: CPT

## 2022-10-12 PROCEDURE — 1200000000 HC SEMI PRIVATE

## 2022-10-12 PROCEDURE — 97530 THERAPEUTIC ACTIVITIES: CPT

## 2022-10-12 PROCEDURE — 85025 COMPLETE CBC W/AUTO DIFF WBC: CPT

## 2022-10-12 PROCEDURE — 99232 SBSQ HOSP IP/OBS MODERATE 35: CPT | Performed by: SURGERY

## 2022-10-12 PROCEDURE — 36415 COLL VENOUS BLD VENIPUNCTURE: CPT

## 2022-10-12 RX ADMIN — TAMSULOSIN HYDROCHLORIDE 0.4 MG: 0.4 CAPSULE ORAL at 09:49

## 2022-10-12 RX ADMIN — POLYETHYLENE GLYCOL 3350 17 G: 17 POWDER, FOR SOLUTION ORAL at 09:49

## 2022-10-12 RX ADMIN — METHOCARBAMOL TABLETS 500 MG: 500 TABLET, COATED ORAL at 16:25

## 2022-10-12 RX ADMIN — ATORVASTATIN CALCIUM 40 MG: 40 TABLET, FILM COATED ORAL at 21:07

## 2022-10-12 RX ADMIN — METHOCARBAMOL TABLETS 500 MG: 500 TABLET, COATED ORAL at 11:42

## 2022-10-12 RX ADMIN — METOPROLOL SUCCINATE 50 MG: 50 TABLET, EXTENDED RELEASE ORAL at 09:49

## 2022-10-12 RX ADMIN — HYDROCHLOROTHIAZIDE 25 MG: 25 TABLET ORAL at 09:55

## 2022-10-12 RX ADMIN — SENNOSIDES AND DOCUSATE SODIUM 1 TABLET: 50; 8.6 TABLET ORAL at 09:55

## 2022-10-12 RX ADMIN — OXYCODONE 2.5 MG: 5 TABLET ORAL at 16:25

## 2022-10-12 RX ADMIN — ISOSORBIDE MONONITRATE 30 MG: 30 TABLET, EXTENDED RELEASE ORAL at 09:49

## 2022-10-12 RX ADMIN — SENNOSIDES AND DOCUSATE SODIUM 1 TABLET: 50; 8.6 TABLET ORAL at 21:07

## 2022-10-12 RX ADMIN — ACETAMINOPHEN 650 MG: 325 TABLET, FILM COATED ORAL at 05:53

## 2022-10-12 RX ADMIN — RIVAROXABAN 20 MG: 20 TABLET, FILM COATED ORAL at 09:49

## 2022-10-12 RX ADMIN — ACETAMINOPHEN 650 MG: 325 TABLET, FILM COATED ORAL at 21:07

## 2022-10-12 RX ADMIN — METHOCARBAMOL TABLETS 500 MG: 500 TABLET, COATED ORAL at 09:54

## 2022-10-12 RX ADMIN — METHOCARBAMOL TABLETS 500 MG: 500 TABLET, COATED ORAL at 21:07

## 2022-10-12 RX ADMIN — ACETAMINOPHEN 650 MG: 325 TABLET, FILM COATED ORAL at 09:49

## 2022-10-12 RX ADMIN — CLOPIDOGREL BISULFATE 75 MG: 75 TABLET ORAL at 09:49

## 2022-10-12 ASSESSMENT — PAIN DESCRIPTION - LOCATION
LOCATION: RIB CAGE

## 2022-10-12 ASSESSMENT — PAIN - FUNCTIONAL ASSESSMENT: PAIN_FUNCTIONAL_ASSESSMENT: PREVENTS OR INTERFERES SOME ACTIVE ACTIVITIES AND ADLS

## 2022-10-12 ASSESSMENT — PAIN DESCRIPTION - DESCRIPTORS
DESCRIPTORS: ACHING;DISCOMFORT
DESCRIPTORS: SHARP;DISCOMFORT;TENDER
DESCRIPTORS: ACHING;DISCOMFORT
DESCRIPTORS: ACHING;DISCOMFORT;SHARP
DESCRIPTORS: ACHING;DISCOMFORT;NAGGING

## 2022-10-12 ASSESSMENT — PAIN SCALES - GENERAL
PAINLEVEL_OUTOF10: 2
PAINLEVEL_OUTOF10: 6
PAINLEVEL_OUTOF10: 4
PAINLEVEL_OUTOF10: 8
PAINLEVEL_OUTOF10: 6
PAINLEVEL_OUTOF10: 0
PAINLEVEL_OUTOF10: 3

## 2022-10-12 ASSESSMENT — PAIN DESCRIPTION - ORIENTATION
ORIENTATION: LEFT

## 2022-10-12 ASSESSMENT — PAIN DESCRIPTION - PAIN TYPE: TYPE: ACUTE PAIN

## 2022-10-12 ASSESSMENT — PAIN DESCRIPTION - FREQUENCY: FREQUENCY: CONTINUOUS

## 2022-10-12 ASSESSMENT — PAIN DESCRIPTION - ONSET: ONSET: ON-GOING

## 2022-10-12 NOTE — PROGRESS NOTES
Occupational Therapy  OCCUPATIONAL THERAPY TREATMENT SESSION    [place] [address] [address] [address], [facility], [address] TREATMENT NOTE      Date:10/12/2022  Patient Name: Yolande Garvey  MRN: [id number]  : 1945  Room: 7686/7662-K     Per OT Eval:      Evaluating [facility], OTR/L #[id number]     Referring Provider: Yumiko Eli MD  Specific Provider Orders/Date: OT eval and treat 10/10/22     Diagnosis: Trauma Rozena Prude  Fall, initial encounter [place]. XXXA]  Closed fracture of multiple ribs of left side, initial encounter [S22.42XA]  DVT (deep vein thrombosis) in pregnancy [O22.30]   Pt admitted to hospital on 10/9/22 following fall. L wrist abrasion, L rib fx's, B LE edema, L LE DVT     Pertinent Medical History:  has a past medical history of Hyperlipidemia, Hypertension, and Thyroid disease.          Precautions:  Fall Risk, L rib fx's (per imaging, age indeterminate), L LE DVT (+anticoagulation), bed/chair alarm     Assessment of current deficits    [x] Functional mobility         [x]ADLs           [x] Strength                  []Cognition    [x] Functional transfers       [x] IADLs         [x] Safety Awareness   [x]Endurance    [] Fine Coordination                      [x] Balance      [] Vision/perception   []Sensation      []Gross Motor Coordination          [] ROM           [] Delirium                   [] Motor Control      OT PLAN OF CARE   OT POC based on physician orders, patient diagnosis and results of clinical assessment     Frequency/Duration 1-3 days/wk for 2 weeks PRN   Specific OT Treatment Interventions to include:   * Instruction/training on adapted ADL techniques and AE recommendations to increase functional independence within precautions       * Training on energy conservation strategies, correct breathing pattern and techniques to improve independence/tolerance for self-care routine  * Functional transfer/mobility training/DME recommendations for increased independence, safety, and fall prevention  * Patient/Family education to increase follow through with safety techniques and functional independence  * Recommendation of environmental modifications for increased safety with functional transfers/mobility and ADLs  * Cognitive retraining/development of therapeutic activities to improve problem solving, judgement, memory, and attention for increased safety/participation in ADL/IADL tasks  * Therapeutic exercise to improve motor endurance, ROM, and functional strength for ADLs/functional transfers  * Therapeutic activities to facilitate/challenge dynamic balance, stand tolerance for increased safety and independence with ADLs  * Therapeutic activities to facilitate gross/fine motor skills for increased independence with ADLs        Recommended Adaptive Equipment: w/w, AE for LB self-care tasks, TBD for additional AE     Home Living: Pt lives alone in 1 floor home. 0 EKTA    Bathroom setup: walk in shower   Equipment owned: w/w, cane, shower chair     Prior Level of Function: independent with ADLs , independent with IADLs; ambulated independently w/o AD   Driving: yes  Enjoys watching grandchildren and playing binBustle     Pain Level: Pt c/o intermittent L rib pain this session ; reinforced pain management/protection strategies including splinting ribs     Cognition: A&O: 3/4 (mild situational confusion noted);  Follows 1 step directions  Pt is very easily distracted - frequent cues provided to focus on and to initiate tasks  Decreased insight to deficits           Memory:  fair           Sequencing:  fair            Problem solving:  fair            Judgement/safety:  fair              Functional Assessment:  AM-PAC Daily Activity Raw Score: 19/24    Initial Eval Status  Date: 10/11/22 Treatment Status  Date:10/12/22 STGs = LTGs  Time frame: 10-14 days   Feeding Independent    -   Grooming Minimal Assist   Seated EOB Min A  Standing at sink  Modified [place]    UB Dressing Minimal Assist   SBA  Night gown Modified [place]    LB Dressing Maximal Assist   Socks  Min A  Donned/doffed socks Supervision    Bathing Moderate Assist  Min A  Bathing task Supervision    Toileting Moderate Assist   Min A  Including hygiene task Supervision    Bed Mobility  Supine to sit: Minimal Assist   Sit to supine: NT   Supine to sit: NT  Sit to supine: SBA Supine to sit: Modified [place]   Sit to supine: Modified [place]    Functional Transfers Sit><stand: Minimal Assist   Stand pivot (EOB>chair): Mod A w/o AD SBA  Various surfaces  Supervision    Functional Mobility Moderate Assist w/o AD     Min A w/ w/w  Increased time to advance L LE d/t pain  SBA w/ w/w  In room and bathroom Supervision    Balance Sitting:     Static:  SBA    Dynamic:Min A  Standing: Mod A w/o AD  Min A w/ w/w Sitting:     Static:  Independent    Dynamic: SBA  Standing: SBA w/ w/w     Activity Tolerance Fair-  Limited by pain   Good   Visual/  Perceptual Glasses: no                              Comments: Upon arrival pt seated EOB. At end of session pt lying in bed (bed alarm on) all lines and tubes intact, call light within reach. Treatment: Therapist facilitated unsupported sitting balance (education/cues for safety, posture, weight shifting, dynamic reaching (various planes) to prep for ADL's), functional transfers (various surfaces w/ education/cues for safety/hand placement), standing tolerance tasks (addressing posture, balance, safety and activity tolerance while incorporating light functional reaching impacting ADLs and functional activity) and functional ambulation tasks with w/w (including to/ from bathroom and item retrieval tasks w/ education/cuing on posture, w/w management and safety).   Therapist then facilitated self-care tasks including: UB/LB bathing task, UB dressing (nightgown), toileting task and standing grooming tasks while educating/cuing pt on modified strategies, posture, safety and techniques to conserve energy. Therapist facilitated bed mobility (cues for splinting ribs and safety). Skilled monitoring of HR, O2 sats and pts response to treatment. Pt on room air. O2 sat=^94%      Patient demonstrated decreased independence and safety during completion of ADL/functional transfer/mobility tasks. Pt would benefit from continued skilled OT to increase safety and independence with ADL/IADL tasks for functional independence and quality of life. Pt has made good progress towards set goals.        Treatment Time In:14:10            Treatment Time Out: 14:35             Treatment Charges: Mins Units   Ther Ex  85890     Manual Therapy 35259     Thera Activities 54147 10 1   ADL/Home Mgt 87238 15 1   Neuro Re-ed 36267     Group Therapy      Orthotic manage/training  06194     Non-Billable Time     Total Timed Treatment 25 45 Cha Saez OTR/L #[id number]

## 2022-10-12 NOTE — DISCHARGE INSTRUCTIONS
TRAUMA SERVICES DISCHARGE INSTRUCTIONS    Call [phone], option 2, for any questions/concerns and for follow-up appointment in 1 week(s). Please follow the instructions checked below:    Please follow-up with your primary care provider. ACTIVITY INSTRUCTIONS  Increase activity as tolerated  No heavy lifting or strenuous activity  Take your incentive spirometer home and use 4-6 times/day   [x]  No driving until cleared by providers    WOUND/DRESSING INSTRUCTIONS:  You may shower. No sitting in bath tub, hot tub or swimming until cleared by physician. Ice to areas of pain for first 24 hours. Heat to areas of pain after that. Wash areas of lacerations/abrasions with soap & water. Rinse well. Pat dry with clean towel. Apply thin layer of Bacitracin, Neosporin, or triple antibiotic cream to affected area 2-3 times per day. Keep wounds clean and dry. []  Sutures/Staples are to be removed    MEDICATION INSTRUCTIONS  Take medication as prescribed. When taking pain medications, you may experience dizziness or drowsiness. Do not drink alcohol or drive when taking these medications. You may experience constipation while taking pain medication. You may take over the counter stool softeners such as docusate (Colace), sennosides S (Senokot-S), or Miralax.   []  You may take Ibuprofen (over the counter) as directed for mild pain. --You may take up to 800mg every 8 hours for pain, please take with food or milk. [x]  You may take acetaminophen (Tylenol) products. Do NOT take more than 4000mg of Tylenol in 24h. [x]  Do not take any other acetaminophen (Tylenol) products if you are taking Percocet or Norco, as these contain Tylenol. --Do NOT take more than 4000mg of Tylenol in 24h. OPIOID MEDICATION INSTRUCTIONS  Read the medication guide that is included with your prescription. Take your medication exactly as prescribed. Store medication away from children and in a safe place.   Do NOT share 4 hours. Ibuprofen up to 800mg every 8 hours. (Please take with food or milk). Over the counter creams and patches such as Salon Pas, [employer], Aspercream, can be useful as well. You were likely given a breathing device called an incentive spirometer while in the hospital to practice deep breathing. It is advised to continue this several times a day while at home to prevent pneumonia. Prevent Complications:  Try to take 5-10 deep breaths every hour while awake. Use the incentive spirometer often. Set goals of deeper breathing every couple of days until reaching normal adult level of about 2500 ml on the printed scale. Activity:  Avoid further chest injury. Plan quiet activity for the first few days. Do not stay in bed. Walk around and move. No rough activities with family, friends or pets. No sports, jumping, jogging or gymnastics. Ask your doctor or the trauma clinic when you will be able to resume these activities. Symptoms to Report:  Call your doctor right away if you notice any of these symptoms:   Increased chest pain  Shortness of breath  Fever  Coughing up blood    Call 911 immediately if these symptoms are severe. Follow-up:  Trauma Clinic: [phone] option 2  [address]  [place], [address] [place]    SPECIAL CONSIDERATIONS FOR OUR PATIENTS OVER THE AGE OF 65Y    Getting around your home safely can be a challenge if you have injuries or health problems that make it easy for you to fall. Loose rugs and furniture in walkways are among the dangers for many older people who have problems walking or who have poor eyesight. People who have conditions such as arthritis, osteoporosis, or dementia also must be careful not to fall. You can make your home safer with a few simple measures. Follow-up care is a key part of your treatment and safety.  Be sure to make and go to all appointments, and call your doctor or nurse call line if you are having problems. It's also a good idea to know your test results and keep a list of the medicines you take. How can you care for yourself at home? Taking care of yourself  You may get dizzy if you do not drink enough water. To prevent dehydration, drink plenty of fluids, enough so that your urine is light yellow or clear like water. Choose water and other caffeine-free clear liquids. If you have kidney, heart, or liver disease and have to limit fluids, talk with your doctor before you increase the amount of fluids you drink. Exercise regularly to improve your strength, muscle tone, and balance. Walk if you can. Swimming may be a good choice if you cannot walk easily. Have your vision and hearing checked each year or any time you notice a change. If you have trouble seeing and hearing, you might not be able to avoid objects and could lose your balance. Know the side effects of the medicines you take. Ask your doctor or pharmacist whether the medicines you take can affect your balance. Sleeping pills or sedatives can affect your balance. Limit the amount of alcohol you drink. Alcohol can impair your balance and other senses. Ask your doctor whether calluses or corns on your feet need to be removed. If you wear loose-fitting shoes because of calluses or corns, you can lose your balance and fall. Talk to your doctor if you have numbness in your feet. Preventing falls at home  Remove raised doorway thresholds, throw rugs, and clutter. Repair loose carpet or raised areas in the floor. Move furniture and electrical cords to keep them out of walking paths. Use non-skid floor wax, and wipe up spills right away, especially on ceramic tile floors. If you use a walker or cane, put rubber tips on it. If you use crutches, clean the bottoms of them regularly with an abrasive pad, such as steel wool. Keep your house well lit, especially stairways, porches, and outside walkways.  Use night-lights in areas such as hallways and washrooms. Add extra light switches or use remote switches (such as switches that go on or off when you clap your hands) to make it easier to turn lights on if you have to get up during the night. Install sturdy handrails on stairways. Move items in your cabinets so that the things you use a lot are on the lower shelves (about waist level). Keep a cordless phone and a flashlight with new batteries by your bed. If possible, put a phone in each of the main rooms of your house, or carry a cell phone in case you fall and cannot reach a phone. Or, you can wear a device around your neck or wrist. You push a button that sends a signal for help. Wear low-heeled shoes that fit well and give your feet good support. Use footwear with non-skid soles. Check the heels and soles of your shoes for wear. Repair or replace worn heels or soles. Do not wear socks without shoes on wood floors. Walk on the grass when the sidewalks are slippery. If you live in an area that gets snow and ice in the winter, sprinkle salt on slippery steps and sidewalks. Preventing falls in the bath  Install grab bars and non-skid mats inside and outside your shower or tub and near the toilet and sinks. Use shower chairs and bath benches. Use a hand-held shower head that will allow you to sit while showering. Get into a tub or shower by putting the weaker leg in first. Get out of a tub or shower with your strong side first.  Repair loose toilet seats and consider installing a raised toilet seat to make getting on and off the toilet easier. Keep your washroom door unlocked while you are in the shower.     Follow-up:  Trauma Clinic: [phone] option [address]  [place], [address] [place]

## 2022-10-12 NOTE — PROGRESS NOTES
PCP is Ruma Salazar DO  Office notified of admission.       Electronically signed by Betariz Henry RN MSN APRN-NP [facility] NP  CCNS CCRN 10/12/2022 1:52 PM

## 2022-10-12 NOTE — ACP (ADVANCE CARE PLANNING)
Advance Care Planning   Healthcare Decision Maker:    Primary Decision Maker: Flaco Teressa Lombardo Child - [phone]    Secondary Decision Maker: Twila Neeru - Child - [phone]    Click here to complete Healthcare Decision Makers including selection of the Healthcare Decision Maker Relationship (ie \"Primary\").

## 2022-10-12 NOTE — PROGRESS NOTES
[facility]  ATTENDING PHYSICIAN PROGRESS NOTE      I personally saw, examined and provided care for the patient. Radiographs, labs and medications were reviewed by me independently. The case was discussed in detail and plans for care were established. Review of Residents documentation was conducted and revisions were made as appropriate. I agree with the above documented exam, problem list and plan of care. The following summarizes my clinical findings and independent assessment. CC: Fall    S. Patient developed a DVT while on Eliquis. Appreciate hematology input-transition to Xarelto    O. Awake and alert x3  No apparent distress  Lungs clear, left chest wall tenderness  Abdomen soft nontender nondistended  Left lower extremity more swollen than right    ASSESSMENT:  Principal Problem:    Trauma  Active Problems:    Acute deep vein thrombosis (DVT) of tibial vein of left lower extremity (HCC)    Closed fracture of multiple ribs of left side    Anticoagulation management encounter    Fall    Coronary artery disease involving native coronary artery of native heart with angina pectoris (HCC)    Longstanding persistent atrial fibrillation ([address]    DVT (deep vein thrombosis) in pregnancy    Obesity (BMI 35.0-39.9 without comorbidity)  Resolved Problems:    * No resolved hospital problems. *       PLAN:  Multiple left-sided chest wall rib fractures-continue aggressive pain control and pulmonary toilet    Acute DVT of left lower extremity present admission while on Eliquis-appreciate hematology input. Eliquis has been stopped and patient has been changed to [address]    Acute urinary retention-continue Borges    DVT Proph: SCDS/Eliquis  PT OT    Refer to discharge summary as part of the discharge planning. Keira Felipe MD, FACS  10/12/2022  12:30 PM    NOTE: This report was transcribed using voice recognition software.  Every effort was made to ensure accuracy; however, inadvertent computerized transcription errors may be present.

## 2022-10-12 NOTE — PROGRESS NOTES
Physician Progress Note      PATIENT:               Suma Marley  CSN #:                  [id number]  :                       1945  ADMIT DATE:       10/10/2022 12:09 AM  [address] [place] DATE:  RESPONDING  PROVIDER #:        Fercho Ortiz MD          QUERY TEXT:    Patient admitted with Fall with multiple Rib fractures and L posterior tibial   vein DVT, noted to have Persistent atrial fibrillation and is maintained on   Eliquis. If possible, please document in progress notes and discharge summary   if you are evaluating and/or treating any of the following: The medical record reflects the following:  Risk Factors: Atrial Fib on Eliquis  Clinical Indicators:  10/10/22: Cardiology:  LLE DVT --Failed apixaban therapy. Recommend   hematology consultation given failed apixaban therapy  10/11/22: Hematology--Pt states she has been compliant with Eliquis and has   not missed doses. Transition to Xarelto. If she fails then will go on   Warfarin. Treatment: Cardiology and Hematology consult; Xarelto PO; Imaging; Thank Reese Armstrong BSN, R.N.  Clinical Documentation Improvement  [phone]  Options provided:  -- Secondary hypercoagulable state in a patient with atrial fibrillation  -- Other - I will add my own diagnosis  -- Disagree - Not applicable / Not valid  -- Disagree - Clinically unable to determine / Unknown  -- Refer to Clinical Documentation Reviewer    PROVIDER RESPONSE TEXT:    This patient has secondary hypercoagulable state related to atrial   fibrillation.     Query created by: Jose Shea on 10/12/2022 7:31 AM      Electronically signed by:  Fercho Ortiz MD 10/12/2022 9:02 AM

## 2022-10-12 NOTE — CARE COORDINATION
Care Coordination: Met with pt at the bedside to discuss transition of care upon discharge. Lives alone, but has children and grandkids all throughout the day. Sometimes grandkids stay over. She states she is completely independent. No hx of hhc, dme or man. Children will transport home at discharge. Uses [facility] [address]. Was following at clinic with Dr Adamaris Robison but now found out he retired. She will continue to follow at same clinic as she has seen other physicians there when he has been on vacation. Children will transport home at discharge. States she is completely independent, this came on suddenly. She was outside in line to get into bingo and then just fell, she does not remember if she tripped but does not feel she passed out. Pt hx of elqius but now with dvt and hematology has been consulted as well. Pt is observation status. Ptx today improved from 14/24 to 22/24 overnight. Pt is transitioning to Xarelto per hematology. Will check dc disposition with attending team as observation status. Will follow    Marilu Tsang    The Plan for Transition of Care is related to the following treatment goals: dc plan    The Patient and/or patient representative was provided with a choice of provider and agrees   with the discharge plan. [x] Yes [] No    Freedom of choice list was provided with basic dialogue that supports the patient's individualized plan of care/goals, treatment preferences and shares the quality data associated with the providers.  [x] Yes [] No

## 2022-10-12 NOTE — PROGRESS NOTES
Physical Therapy  Treatment Note    Name: Olu Chapman  : 1945  MRN: [id number]      Date of Service: 10/12/2022    Evaluating PT:  Isak Morris PT, DPT [id number]    Room #:  2124/0124-G  Diagnosis:  Trauma Dai Ureña  Fall, initial encounter [W19. XXXA]  Closed fracture of multiple ribs of left side, initial encounter [S22.42XA]  DVT (deep vein thrombosis) in pregnancy [O22.30]  PMHx/PSHx:  HLD, HTN, thyroid disease  Procedure/Surgery:  none this admission  Precautions:  Falls, L rib fxs  Equipment Needs:  potential need for FWW if home DC    SUBJECTIVE:    Pt lives alone in a 1 story home with level entry. Bed is on 1st floor and bath is on 1st floor. Pt ambulated with no AD PTA. Pt reports that her daughter visits almost daily. OBJECTIVE:   Initial Evaluation  Date: 10/11/22 Treatment  Date: 10/12/22 Short Term/ Long Term   Goals   AM-PAC 6 Clicks  51/17    Was pt agreeable to Eval/treatment? yes Yes    Does pt have pain? L rib pain, LLE pain d/t DVT No c/o pain    Bed Mobility  Rolling: [place]  Supine to sit: [place]  Sit to supine: [place]  Scooting: [place] NT Rolling: Independent   Supine to sit: Independent   Sit to supine: Independent   Scooting: Independent    Transfers Sit to stand: [place]  Stand to sit: [place]  Stand pivot: [place] Sit<>stand: Independent   Stand pivot: Modified Independent with [facility] Sit to stand: Independent   Stand to sit: Independent   Stand pivot: Mod I with AAD   Ambulation    5 feet x2 with [facility] Delmer 60 feet with WW with SBA 50 feet with AAD Mod I    Stair negotiation: ascended and descended  NT NT TBD   ROM BUE:  Defer to OT note  BLE:  WFL BLE: WFL    Strength BUE:  Defer to OT note  BLE:  grossly 3/5 BLE: grossly 4/5 WNL   Balance Sitting EOB:  SBA  Dynamic Standing:  [place] with [facility] Sitting: Independent   Standing: SBA Sitting EOB:  Independent   Dynamic Standing:   Mod I with [facility]     Pt is A & O x 4  Sensation:  Denied numbness/tingling  Edema:  Mild LLE swelling, nonpitting

## 2022-10-12 NOTE — PROGRESS NOTES
PCP is Vandana Landin DO  Office notified of admission.       Electronically signed by Simona Thorpe RN MSN APRN-NP [facility] NP  CCNS CCRN 10/12/2022 2:01 PM

## 2022-10-13 VITALS
HEART RATE: 94 BPM | HEIGHT: 65 IN | WEIGHT: 199.9 LBS | DIASTOLIC BLOOD PRESSURE: 71 MMHG | RESPIRATION RATE: 16 BRPM | TEMPERATURE: 97.9 F | OXYGEN SATURATION: 100 % | BODY MASS INDEX: 33.3 KG/M2 | SYSTOLIC BLOOD PRESSURE: 118 MMHG

## 2022-10-13 LAB
ANION GAP SERPL CALCULATED.3IONS-SCNC: 11 MMOL/L (ref 7–16)
BASOPHILS ABSOLUTE: 0.03 E9/L (ref 0–0.2)
BASOPHILS RELATIVE PERCENT: 0.5 % (ref 0–2)
BUN BLDV-MCNC: 25 MG/DL (ref 6–23)
CALCIUM SERPL-MCNC: 8.8 MG/DL (ref 8.6–10.2)
CHLORIDE BLD-SCNC: 104 MMOL/L (ref 98–107)
CO2: 24 MMOL/L (ref 22–29)
CREAT SERPL-MCNC: 1.4 MG/DL (ref 0.5–1)
EOSINOPHILS ABSOLUTE: 0.16 E9/L (ref 0.05–0.5)
EOSINOPHILS RELATIVE PERCENT: 2.7 % (ref 0–6)
GFR AFRICAN AMERICAN: 44
GFR NON-AFRICAN AMERICAN: 36 ML/MIN/1.73
GLUCOSE BLD-MCNC: 118 MG/DL (ref 74–99)
HCT VFR BLD CALC: 31.3 % (ref 34–48)
HEMOGLOBIN: 9.7 G/DL (ref 11.5–15.5)
IMMATURE GRANULOCYTES #: 0.02 E9/L
IMMATURE GRANULOCYTES %: 0.3 % (ref 0–5)
LYMPHOCYTES ABSOLUTE: 1.48 E9/L (ref 1.5–4)
LYMPHOCYTES RELATIVE PERCENT: 25.4 % (ref 20–42)
MCH RBC QN AUTO: 29.7 PG (ref 26–35)
MCHC RBC AUTO-ENTMCNC: 31 % (ref 32–34.5)
MCV RBC AUTO: 95.7 FL (ref 80–99.9)
MONOCYTES ABSOLUTE: 0.55 E9/L (ref 0.1–0.95)
MONOCYTES RELATIVE PERCENT: 9.4 % (ref 2–12)
NEUTROPHILS ABSOLUTE: 3.59 E9/L (ref 1.8–7.3)
NEUTROPHILS RELATIVE PERCENT: 61.7 % (ref 43–80)
PDW BLD-RTO: 14.6 FL (ref 11.5–15)
PLATELET # BLD: 168 E9/L (ref 130–450)
PMV BLD AUTO: 10 FL (ref 7–12)
POTASSIUM REFLEX MAGNESIUM: 4.1 MMOL/L (ref 3.5–5)
RBC # BLD: 3.27 E12/L (ref 3.5–5.5)
SODIUM BLD-SCNC: 139 MMOL/L (ref 132–146)
WBC # BLD: 5.8 E9/L (ref 4.5–11.5)

## 2022-10-13 PROCEDURE — 99232 SBSQ HOSP IP/OBS MODERATE 35: CPT | Performed by: SURGERY

## 2022-10-13 PROCEDURE — 6370000000 HC RX 637 (ALT 250 FOR IP): Performed by: CLINICAL NURSE SPECIALIST

## 2022-10-13 PROCEDURE — 99239 HOSP IP/OBS DSCHRG MGMT >30: CPT | Performed by: CLINICAL NURSE SPECIALIST

## 2022-10-13 PROCEDURE — 85025 COMPLETE CBC W/AUTO DIFF WBC: CPT

## 2022-10-13 PROCEDURE — 6370000000 HC RX 637 (ALT 250 FOR IP)

## 2022-10-13 PROCEDURE — 80048 BASIC METABOLIC PNL TOTAL CA: CPT

## 2022-10-13 PROCEDURE — 36415 COLL VENOUS BLD VENIPUNCTURE: CPT

## 2022-10-13 RX ORDER — OXYCODONE HYDROCHLORIDE 5 MG/1
2.5 TABLET ORAL EVERY 4 HOURS PRN
Qty: 4 TABLET | Refills: 0 | Status: SHIPPED | OUTPATIENT
Start: 2022-10-13 | End: 2022-10-13 | Stop reason: SDUPTHER

## 2022-10-13 RX ORDER — METHOCARBAMOL 500 MG/1
500 TABLET, FILM COATED ORAL 4 TIMES DAILY
Qty: 40 TABLET | Refills: 0 | Status: SHIPPED | OUTPATIENT
Start: 2022-10-13 | End: 2022-10-23

## 2022-10-13 RX ORDER — TAMSULOSIN HYDROCHLORIDE 0.4 MG/1
0.4 CAPSULE ORAL DAILY
Qty: 14 CAPSULE | Refills: 0 | Status: SHIPPED | OUTPATIENT
Start: 2022-10-13 | End: 2022-10-27

## 2022-10-13 RX ORDER — OXYCODONE HYDROCHLORIDE 5 MG/1
2.5 TABLET ORAL EVERY 4 HOURS PRN
Qty: 4 TABLET | Refills: 0 | Status: SHIPPED | OUTPATIENT
Start: 2022-10-13 | End: 2022-10-20

## 2022-10-13 RX ORDER — LIDOCAINE 4 G/G
1 PATCH TOPICAL DAILY
Qty: 14 EACH | Refills: 0 | Status: SHIPPED | OUTPATIENT
Start: 2022-10-13 | End: 2022-10-27

## 2022-10-13 RX ADMIN — SENNOSIDES AND DOCUSATE SODIUM 1 TABLET: 50; 8.6 TABLET ORAL at 09:25

## 2022-10-13 RX ADMIN — CLOPIDOGREL BISULFATE 75 MG: 75 TABLET ORAL at 09:25

## 2022-10-13 RX ADMIN — POLYETHYLENE GLYCOL 3350 17 G: 17 POWDER, FOR SOLUTION ORAL at 09:24

## 2022-10-13 RX ADMIN — METOPROLOL SUCCINATE 50 MG: 50 TABLET, EXTENDED RELEASE ORAL at 09:26

## 2022-10-13 RX ADMIN — METHOCARBAMOL TABLETS 500 MG: 500 TABLET, COATED ORAL at 09:25

## 2022-10-13 RX ADMIN — METHOCARBAMOL TABLETS 500 MG: 500 TABLET, COATED ORAL at 13:33

## 2022-10-13 RX ADMIN — ISOSORBIDE MONONITRATE 30 MG: 30 TABLET, EXTENDED RELEASE ORAL at 09:25

## 2022-10-13 RX ADMIN — ACETAMINOPHEN 650 MG: 325 TABLET, FILM COATED ORAL at 13:33

## 2022-10-13 RX ADMIN — ACETAMINOPHEN 650 MG: 325 TABLET, FILM COATED ORAL at 09:25

## 2022-10-13 RX ADMIN — TAMSULOSIN HYDROCHLORIDE 0.4 MG: 0.4 CAPSULE ORAL at 09:25

## 2022-10-13 RX ADMIN — HYDROCHLOROTHIAZIDE 25 MG: 25 TABLET ORAL at 09:25

## 2022-10-13 ASSESSMENT — PAIN SCALES - GENERAL: PAINLEVEL_OUTOF10: 0

## 2022-10-13 NOTE — DISCHARGE SUMMARY
Physician Discharge Summary     Patient ID:  Addi Butt  [id number]  76 y.o.  1945    Admit date: 10/10/2022    Discharge date and time: 10/13/22      Admitting Physician: Ale Huynh MD     Admission Diagnoses: Trauma Garcia Peed  Fall, initial encounter [W19. XXXA]  Closed fracture of multiple ribs of left side, initial encounter [S22.42XA]  DVT (deep vein thrombosis) in pregnancy [O22.30]    Discharge Diagnoses: Principal Problem:    Trauma  Active Problems:    Acute deep vein thrombosis (DVT) of tibial vein of left lower extremity (HCC)    Closed fracture of multiple ribs of left side    Anticoagulation management encounter    Fall    Coronary artery disease involving native coronary artery of native heart with angina pectoris (HCC)    Longstanding persistent atrial fibrillation ([facility] [address]    DVT (deep vein thrombosis) in pregnancy    Obesity (BMI 35.0-39.9 without comorbidity)  Resolved Problems:    * No resolved hospital problems. *      Admission Condition: fair    Discharged Condition: stable    Indication for Admission: fall with multiple L rib fx     Hospital Course/Procedures/Operation/treatments:   10/10: trauma consult: leg gave out oat bingo. Multiple mutiage L rib fx. US LLE DVT. Cardiology and heme/onc consulted for [address] [place] [address]. No new findings on tertiary   10/11: PT/OT eval; [address] [place] [address] xarelto  10/12: 22/24 with PT OT. Dispo planning  10/13: Pt on Xarelto for DVT. DC home today          Consults:   IP CONSULT TO TRAUMA SURGERY  IP CONSULT TO CARDIOLOGY  IP CONSULT TO ONCOLOGY    Significant Diagnostic Studies:   XR RIBS LEFT INCLUDE CHEST (MIN 3 VIEWS)    Result Date: 10/10/2022  EXAMINATION: XRAY VIEWS OF THE LEFT RIBS WITH FRONTAL XRAY VIEW OF THE CHEST 10/10/2022 1:02 am COMPARISON: None. HISTORY: ORDERING SYSTEM PROVIDED HISTORY: fall TECHNOLOGIST PROVIDED HISTORY: Reason for exam:->fall What reading provider will be dictating this exam?->CRC FINDINGS: The lungs are without acute focal process.   There is no effusion or pneumothorax. Cardiomegaly. The osseous structures are without acute process. No acute process. XR PELVIS (1-2 VIEWS)    Result Date: 10/10/2022  EXAMINATION: ONE XRAY VIEW OF THE PELVIS 10/10/2022 7:01 am COMPARISON: None. HISTORY: ORDERING SYSTEM PROVIDED HISTORY: trauma TECHNOLOGIST PROVIDED HISTORY: Reason for exam:->trauma What reading provider will be dictating this exam?->CRC FINDINGS: Imaging of the pelvis reveals minimal degenerative changes seen within the sacroiliac joints bilaterally and pubic symphysis. Cortex is intact. Joint spaces are preserved. No acute bony abnormality. Phleboliths seen on both sides of the pelvis. No acute bony abnormality. XR WRIST LEFT (MIN 3 VIEWS)    Result Date: 10/10/2022  EXAMINATION: XRAY VIEWS OF THE LEFT WRIST 10/10/2022 1:02 am COMPARISON: None. HISTORY: ORDERING SYSTEM PROVIDED HISTORY: fall TECHNOLOGIST PROVIDED HISTORY: Reason for exam:->fall What reading provider will be dictating this exam?->CRC FINDINGS: No acute fracture or dislocation is identified. No destructive osseous lesion is visualized. The joint spaces are preserved. No acute findings. XR KNEE LEFT (1-2 VIEWS)    Result Date: 10/10/2022  EXAMINATION: TWO XRAY VIEWS OF THE LEFT KNEE 10/10/2022 1:02 am COMPARISON: None. HISTORY: ORDERING SYSTEM PROVIDED HISTORY: eval pain TECHNOLOGIST PROVIDED HISTORY: Reason for exam:->eval pain What reading provider will be dictating this exam?->CRC FINDINGS: Narrowing of the medial compartment. Lateral femoral condyle marginal osteophyte. No evidence of acute fracture or dislocation. No focal osseous lesion. No evidence of joint effusion. No focal soft tissue abnormality. No acute abnormality of the knee. XR KNEE RIGHT (1-2 VIEWS)    Result Date: 10/10/2022  EXAMINATION: TWO XRAY VIEWS OF THE RIGHT KNEE 10/10/2022 1:02 am COMPARISON: None.  HISTORY: ORDERING SYSTEM PROVIDED HISTORY: eval swelling TECHNOLOGIST achievable. COMPARISON: None. HISTORY: ORDERING SYSTEM PROVIDED HISTORY: fall TECHNOLOGIST PROVIDED HISTORY: Reason for exam:->fall Has a \"code stroke\" or \"stroke alert\" been called? ->No Decision Support Exception - unselect if not a suspected or confirmed emergency medical condition->Emergency Medical Condition (MA) What reading provider will be dictating this exam?->CRC FINDINGS: BRAIN/VENTRICLES: There is no acute intracranial hemorrhage, mass effect or midline shift. No abnormal extra-axial fluid collection. The gray-white differentiation is maintained without evidence of an acute infarct. There is no evidence of hydrocephalus. ORBITS: The visualized portion of the orbits demonstrate no acute abnormality. SINUSES: The visualized paranasal sinuses and mastoid air cells demonstrate no acute abnormality. SOFT TISSUES/SKULL:  No acute abnormality of the visualized skull or soft tissues. No acute intracranial abnormality. CT CHEST WO CONTRAST    Result Date: 10/10/2022  EXAMINATION: CT OF THE CHEST WITHOUT CONTRAST 10/10/2022 2:02 am TECHNIQUE: CT of the chest was performed without the administration of intravenous contrast. Multiplanar reformatted images are provided for review. Automated exposure control, iterative reconstruction, and/or weight based adjustment of the mA/kV was utilized to reduce the radiation dose to as low as reasonably achievable. COMPARISON: None. HISTORY: ORDERING SYSTEM PROVIDED HISTORY: fall, left rib pain TECHNOLOGIST PROVIDED HISTORY: Reason for exam:->fall, left rib pain Decision Support Exception - unselect if not a suspected or confirmed emergency medical condition->Emergency Medical Condition (MA) What reading provider will be dictating this exam?->CRC FINDINGS: Aorta: No evidence of thoracic aortic aneurysm or dissection. No acute abnormality of the aorta. Mediastinum: No evidence of mediastinal lymphadenopathy. The heart and pericardium demonstrate no acute abnormality. Lungs/Pleura: The lungs are without acute process. No focal consolidation or pulmonary edema. No evidence of pleural effusion or pneumothorax. Upper Abdomen: Limited images of the upper abdomen are unremarkable. There are age indeterminate fractures of the left 2nd, 4th-7th ribs anterior laterally. Age-indeterminate fractures of the left 2nd, 4th through 7th ribs anterior laterally     CT CERVICAL SPINE WO CONTRAST    Result Date: 10/10/2022  EXAMINATION: CT OF THE CERVICAL SPINE WITHOUT CONTRAST 10/10/2022 2:02 am TECHNIQUE: CT of the cervical spine was performed without the administration of intravenous contrast. Multiplanar reformatted images are provided for review. Automated exposure control, iterative reconstruction, and/or weight based adjustment of the mA/kV was utilized to reduce the radiation dose to as low as reasonably achievable. COMPARISON: None. HISTORY: ORDERING SYSTEM PROVIDED HISTORY: fall TECHNOLOGIST PROVIDED HISTORY: Reason for exam:->fall Decision Support Exception - unselect if not a suspected or confirmed emergency medical condition->Emergency Medical Condition (MA) What reading provider will be dictating this exam?->CRC FINDINGS: BONES/ALIGNMENT: There is no acute fracture or traumatic malalignment. DEGENERATIVE CHANGES: There is degenerative disc disease and mild facet arthropathy. There is moderate spinal canal stenosis C5-C6 SOFT TISSUES: There is no prevertebral soft tissue swelling. No acute abnormality of the cervical spine. Multilevel degenerative change with moderate spinal canal stenosis at C5-C6. US DUP LOWER EXTREMITIES BILATERAL VENOUS    Result Date: 10/10/2022  EXAMINATION: DUPLEX VENOUS ULTRASOUND OF THE BILATERAL LOWER [id number]/10/2022 9:04 am TECHNIQUE: Duplex ultrasound using B-mode/gray scaled imaging, Doppler spectral analysis and color flow Doppler was obtained of the deep venous structures of the lower bilateral extremities. COMPARISON: None. tamsulosin (FLOMAX) 0.4 MG capsule Take 1 capsule by mouth daily for 14 days  Qty: 14 capsule, Refills: 0      oxyCODONE (ROXICODONE) 5 MG immediate release tablet Take 0.5 tablets by mouth every 4 hours as needed for Pain for up to 7 days. Qty: 4 tablet, Refills: 0    Comments: Reduce doses taken as pain becomes manageable  Associated Diagnoses: Closed fracture of multiple ribs of left side, initial encounter                Details   hydroCHLOROthiazide (HYDRODIURIL) 25 MG tablet Take 25 mg by mouth daily      clopidogrel (PLAVIX) 75 MG tablet Take 1 tablet by mouth daily  Qty: 90 tablet, Refills: 3      metoprolol succinate (TOPROL XL) 50 MG extended release tablet Take 1 tablet by mouth daily  Qty: 30 tablet, Refills: 3      isosorbide mononitrate (IMDUR) 30 MG extended release tablet Take 1 tablet by mouth daily  Qty: 30 tablet, Refills: 3      atorvastatin (LIPITOR) 40 MG tablet Take 1 tablet by mouth nightly  Qty: 30 tablet, Refills: 3      amLODIPine (NORVASC) 10 MG tablet Take 10 mg by mouth daily    Associated Diagnoses: Essential hypertension             TRAUMA SERVICES DISCHARGE INSTRUCTIONS    Call [phone], option 2, for any questions/concerns and for follow-up appointment in 1 week(s). Please follow the instructions checked below:    Please follow-up with your primary care provider. ACTIVITY INSTRUCTIONS  Increase activity as tolerated  No heavy lifting or strenuous activity  Take your incentive spirometer home and use 4-6 times/day   [x]  No driving until cleared by providers    WOUND/DRESSING INSTRUCTIONS:  You may shower. No sitting in bath tub, hot tub or swimming until cleared by physician. Ice to areas of pain for first 24 hours. Heat to areas of pain after that. Wash areas of lacerations/abrasions with soap & water. Rinse well. Pat dry with clean towel. Apply thin layer of Bacitracin, Neosporin, or triple antibiotic cream to affected area 2-3 times per day.   Keep wounds clean and dry. []  Sutures/Staples are to be removed    MEDICATION INSTRUCTIONS  Take medication as prescribed. When taking pain medications, you may experience dizziness or drowsiness. Do not drink alcohol or drive when taking these medications. You may experience constipation while taking pain medication. You may take over the counter stool softeners such as docusate (Colace), sennosides S (Senokot-S), or Miralax.   []  You may take Ibuprofen (over the counter) as directed for mild pain. --You may take up to 800mg every 8 hours for pain, please take with food or milk. [x]  You may take acetaminophen (Tylenol) products. Do NOT take more than 4000mg of Tylenol in 24h. [x]  Do not take any other acetaminophen (Tylenol) products if you are taking Percocet or Norco, as these contain Tylenol. --Do NOT take more than 4000mg of Tylenol in 24h. OPIOID MEDICATION INSTRUCTIONS  Read the medication guide that is included with your prescription. Take your medication exactly as prescribed. Store medication away from children and in a safe place. Do NOT share your medication with others. Do NOT take medication unless it is prescribed for you. Do NOT drink alcohol while taking opioids (I.e., Norco, Percocet, Oxycodone, etc). Discuss with the Trauma Clinic staff if the dose of medication you are taking does not control your pain and any side effects that you may be having. CALL 911 OR YOUR LOCAL EMERGENCY SERVICE:  --If you take too much medication  --If you have trouble breathing or shortness of breath  --A child has taken this medication. WORK:  You may not return to work until you receive follow-up with the Trauma Clinic or clearance by all consultants. Call the trauma clinic for any of the following or for questions/concerns;  --fever over 101F  --redness, swelling, hardness or warmth at the wound site(s).   --Unrelieved nausea/vomiting  --Foul smelling or cloudy drainage at the wound site(s)  --Unrelieved pain or increase in pain  --Increase in shortness of breath    Follow-up:  Trauma Clinic: [phone] option 2  [address], [address] INSTRUCTIONS    Your ribs are curved bones in your chest that protect inner structures like your lungs and heart. The ribs expand and contract when you breathe. Pain can result making it hard to cough or breathe deeply. The difficulty increases if several ribs are broken on both sides. You are likely to heal in 6 to 8 weeks, but may take longer if you are elderly. Most rib fractures heal on their own with no lasting effects. Treatment:   Take the medications given to you as prescribed. Your pain may not go completely away after discharge from the hospital, but we want your pain tolerable so you can take deep breaths. As your pain becomes controlled you may switch to taking over-the-counter medications such as Tylenol and or Ibuprofen as directed. Tylenol (acetaminophen) 1000mg every 6 hours or you may take 650mg every 4 hours. Ibuprofen up to 800mg every 8 hours. (Please take with food or milk). Over the counter creams and patches such as Salon Pas, [employer], Aspercream, can be useful as well. You were likely given a breathing device called an incentive spirometer while in the hospital to practice deep breathing. It is advised to continue this several times a day while at home to prevent pneumonia. Prevent Complications:  Try to take 5-10 deep breaths every hour while awake. Use the incentive spirometer often. Set goals of deeper breathing every couple of days until reaching normal adult level of about 2500 ml on the printed scale. Activity:  Avoid further chest injury. Plan quiet activity for the first few days. Do not stay in bed. Walk around and move. No rough activities with family, friends or pets.   No sports, jumping, jogging or gymnastics. Ask your doctor or the trauma clinic when you will be able to resume these activities. Symptoms to Report:  Call your doctor right away if you notice any of these symptoms:   Increased chest pain  Shortness of breath  Fever  Coughing up blood    Call 911 immediately if these symptoms are severe. Follow-up:  Trauma Clinic: [phone] option 2  [place], [address] [address]    SPECIAL CONSIDERATIONS FOR OUR PATIENTS OVER THE AGE OF 65Y    Getting around your home safely can be a challenge if you have injuries or health problems that make it easy for you to fall. Loose rugs and furniture in walkways are among the dangers for many older people who have problems walking or who have poor eyesight. People who have conditions such as arthritis, osteoporosis, or dementia also must be careful not to fall. You can make your home safer with a few simple measures. Follow-up care is a key part of your treatment and safety. Be sure to make and go to all appointments, and call your doctor or nurse call line if you are having problems. It's also a good idea to know your test results and keep a list of the medicines you take. How can you care for yourself at home? Taking care of yourself  You may get dizzy if you do not drink enough water. To prevent dehydration, drink plenty of fluids, enough so that your urine is light yellow or clear like water. Choose water and other caffeine-free clear liquids. If you have kidney, heart, or liver disease and have to limit fluids, talk with your doctor before you increase the amount of fluids you drink. Exercise regularly to improve your strength, muscle tone, and balance. Walk if you can. Swimming may be a good choice if you cannot walk easily. Have your vision and hearing checked each year or any time you notice a change.  If you have trouble seeing and hearing, you might not be able to avoid objects and could lose your balance. Know the side effects of the medicines you take. Ask your doctor or pharmacist whether the medicines you take can affect your balance. Sleeping pills or sedatives can affect your balance. Limit the amount of alcohol you drink. Alcohol can impair your balance and other senses. Ask your doctor whether calluses or corns on your feet need to be removed. If you wear loose-fitting shoes because of calluses or corns, you can lose your balance and fall. Talk to your doctor if you have numbness in your feet. Preventing falls at home  Remove raised doorway thresholds, throw rugs, and clutter. Repair loose carpet or raised areas in the floor. Move furniture and electrical cords to keep them out of walking paths. Use non-skid floor wax, and wipe up spills right away, especially on ceramic tile floors. If you use a walker or cane, put rubber tips on it. If you use crutches, clean the bottoms of them regularly with an abrasive pad, such as steel wool. Keep your house well lit, especially stairways, porches, and outside walkways. Use night-lights in areas such as hallways and washrooms. Add extra light switches or use remote switches (such as switches that go on or off when you clap your hands) to make it easier to turn lights on if you have to get up during the night. Install sturdy handrails on stairways. Move items in your cabinets so that the things you use a lot are on the lower shelves (about waist level). Keep a cordless phone and a flashlight with new batteries by your bed. If possible, put a phone in each of the main rooms of your house, or carry a cell phone in case you fall and cannot reach a phone. Or, you can wear a device around your neck or wrist. You push a button that sends a signal for help. Wear low-heeled shoes that fit well and give your feet good support. Use footwear with non-skid soles. Check the heels and soles of your shoes for wear.  Repair or replace worn heels or soles. Do not wear socks without shoes on wood floors. Walk on the grass when the sidewalks are slippery. If you live in an area that gets snow and ice in the winter, sprinkle salt on slippery steps and sidewalks. Preventing falls in the bath  Install grab bars and non-skid mats inside and outside your shower or tub and near the toilet and sinks. Use shower chairs and bath benches. Use a hand-held shower head that will allow you to sit while showering. Get into a tub or shower by putting the weaker leg in first. Get out of a tub or shower with your strong side first.  Repair loose toilet seats and consider installing a raised toilet seat to make getting on and off the toilet easier. Keep your washroom door unlocked while you are in the shower.     Follow-up:  Trauma Clinic: [phone] option [address]  [place], [address] [place]    Follow up:   [address]  [address] [place] [address] [phone]  Schedule an appointment as soon as possible for a visit in 1 week(s)  For follow up    Chelsea [place] [place] [address] [phone]    Call  As needed    Jennifer Spears MD  [address] [place] [address]  [phone]    Schedule an appointment as soon as possible for a visit in 3 week(s)  For follow up    Nathaniel Rivera DO  [address] [place] [address]  [phone]    Schedule an appointment as soon as possible for a visit in 2 week(s)  For follow up       Time spent on discharge: more than 30 minutes  Signed:  Sudeep Moore DO  10/13/2022  3:41 PM

## 2022-10-13 NOTE — PROGRESS NOTES
CLINICAL PHARMACY NOTE: MEDS TO BEDS    Total # of Prescriptions Filled: 5   The following medications were delivered to the patient:  Methocarbamol 500mg  Oxycodone 5mg  Flomax 0.4mg  Lidocaine 4%patch  Xarelto 20mg    Additional Documentation:  Patient's daughter picked up in pharmacy 10-13-22

## 2022-10-13 NOTE — PROGRESS NOTES
[facility]  ATTENDING PHYSICIAN PROGRESS NOTE      I personally saw, examined and provided care for the patient. Radiographs, labs and medications were reviewed by me independently. The case was discussed in detail and plans for care were established. Review of Residents documentation was conducted and revisions were made as appropriate. I agree with the above documented exam, problem list and plan of care. The following summarizes my clinical findings and independent assessment. CC: Fall    S. Patient developed a DVT while on Eliquis. Appreciate hematology input-transition to Xarelto  Patient's PT and PAC score was 22 out of 24. She is sitting in a chair today  O.  Awake and alert x3  No apparent distress  Lungs clear, left chest wall tenderness  Abdomen soft nontender nondistended  Left lower extremity more swollen than right    ASSESSMENT:  Principal Problem:    Trauma  Active Problems:    Acute deep vein thrombosis (DVT) of tibial vein of left lower extremity (HCC)    Closed fracture of multiple ribs of left side    Anticoagulation management encounter    Fall    Coronary artery disease involving native coronary artery of native heart with angina pectoris ([address]    Longstanding persistent atrial fibrillation ([address]    DVT (deep vein thrombosis) in pregnancy    Obesity (BMI 35.0-39.9 without comorbidity)  Resolved Problems:    * No resolved hospital problems. *       PLAN:  Multiple left-sided chest wall rib fractures-continue aggressive pain control and pulmonary toilet    Acute DVT of left lower extremity present admission while on Eliquis-appreciate hematology input. Eliquis has been stopped and patient has been changed to [address] 13      DVT Proph: SCDS/Eliquis  PT OT  Discharge home today  Refer to discharge summary as part of the discharge planning.        Slime Gee MD, FACS  10/13/2022  1:57 PM    NOTE: This report was transcribed using voice recognition software. Every effort was made to ensure accuracy; however, inadvertent computerized transcription errors may be present.

## 2022-10-13 NOTE — PLAN OF CARE
Problem: Discharge Planning  Goal: Discharge to home or other facility with appropriate resources  10/13/2022 1538 by Diogenes Dumont RN  Outcome: Completed  10/13/2022 1235 by Diogenes Dumont RN  Outcome: Progressing     Problem: Pain  Goal: Verbalizes/displays adequate comfort level or baseline comfort level  10/13/2022 1538 by Diogenes Dumont RN  Outcome: Completed  10/13/2022 1235 by Diogenes Dumont RN  Outcome: Progressing     Problem: Safety - Adult  Goal: Free from fall injury  10/13/2022 1538 by Diogenes Dumont RN  Outcome: Completed  10/13/2022 1235 by Diogenes Dumont RN  Outcome: Progressing     Problem: ABCDS Injury Assessment  Goal: Absence of physical injury  10/13/2022 1538 by Diogenes Dumont RN  Outcome: Completed  10/13/2022 1235 by Diogenes Dumont RN  Outcome: Progressing

## 2022-10-19 ENCOUNTER — APPOINTMENT (OUTPATIENT)
Dept: ULTRASOUND IMAGING | Age: 77
End: 2022-10-19
Payer: MEDICARE

## 2022-10-19 ENCOUNTER — HOSPITAL ENCOUNTER (EMERGENCY)
Age: 77
Discharge: HOME | End: 2022-10-19
Attending: EMERGENCY MEDICINE
Payer: MEDICARE

## 2022-10-19 VITALS
OXYGEN SATURATION: 96 % | HEART RATE: 85 BPM | BODY MASS INDEX: 33.32 KG/M2 | TEMPERATURE: 97 F | WEIGHT: 199 LBS | DIASTOLIC BLOOD PRESSURE: 79 MMHG | SYSTOLIC BLOOD PRESSURE: 159 MMHG | RESPIRATION RATE: 20 BRPM

## 2022-10-19 DIAGNOSIS — I82.4Y2 DEEP VEIN THROMBOSIS (DVT) OF PROXIMAL VEIN OF LEFT LOWER EXTREMITY, UNSPECIFIED CHRONICITY (HCC): Primary | ICD-10-CM

## 2022-10-19 LAB
ALBUMIN SERPL-MCNC: 4 G/DL (ref 3.5–5.2)
ALP BLD-CCNC: 99 U/L (ref 35–104)
ALT SERPL-CCNC: 8 U/L (ref 0–32)
ANION GAP SERPL CALCULATED.3IONS-SCNC: 14 MMOL/L (ref 7–16)
AST SERPL-CCNC: 11 U/L (ref 0–31)
BASOPHILS ABSOLUTE: 0.03 E9/L (ref 0–0.2)
BASOPHILS RELATIVE PERCENT: 0.4 % (ref 0–2)
BILIRUB SERPL-MCNC: 0.8 MG/DL (ref 0–1.2)
BUN BLDV-MCNC: 29 MG/DL (ref 6–23)
CALCIUM SERPL-MCNC: 9.5 MG/DL (ref 8.6–10.2)
CHLORIDE BLD-SCNC: 100 MMOL/L (ref 98–107)
CO2: 24 MMOL/L (ref 22–29)
CREAT SERPL-MCNC: 1.5 MG/DL (ref 0.5–1)
EOSINOPHILS ABSOLUTE: 0.21 E9/L (ref 0.05–0.5)
EOSINOPHILS RELATIVE PERCENT: 2.9 % (ref 0–6)
GFR SERPL CREATININE-BSD FRML MDRD: 35 ML/MIN/1.73
GLUCOSE BLD-MCNC: 170 MG/DL (ref 74–99)
HCT VFR BLD CALC: 33.2 % (ref 34–48)
HEMOGLOBIN: 10.7 G/DL (ref 11.5–15.5)
IMMATURE GRANULOCYTES #: 0.02 E9/L
IMMATURE GRANULOCYTES %: 0.3 % (ref 0–5)
LYMPHOCYTES ABSOLUTE: 1.16 E9/L (ref 1.5–4)
LYMPHOCYTES RELATIVE PERCENT: 15.8 % (ref 20–42)
MCH RBC QN AUTO: 29.8 PG (ref 26–35)
MCHC RBC AUTO-ENTMCNC: 32.2 % (ref 32–34.5)
MCV RBC AUTO: 92.5 FL (ref 80–99.9)
MONOCYTES ABSOLUTE: 0.7 E9/L (ref 0.1–0.95)
MONOCYTES RELATIVE PERCENT: 9.5 % (ref 2–12)
NEUTROPHILS ABSOLUTE: 5.21 E9/L (ref 1.8–7.3)
NEUTROPHILS RELATIVE PERCENT: 71.1 % (ref 43–80)
PDW BLD-RTO: 14.6 FL (ref 11.5–15)
PLATELET # BLD: 248 E9/L (ref 130–450)
PMV BLD AUTO: 9.2 FL (ref 7–12)
POTASSIUM REFLEX MAGNESIUM: 3.7 MMOL/L (ref 3.5–5)
RBC # BLD: 3.59 E12/L (ref 3.5–5.5)
SODIUM BLD-SCNC: 138 MMOL/L (ref 132–146)
TOTAL PROTEIN: 7.1 G/DL (ref 6.4–8.3)
WBC # BLD: 7.3 E9/L (ref 4.5–11.5)

## 2022-10-19 PROCEDURE — 99284 EMERGENCY DEPT VISIT MOD MDM: CPT

## 2022-10-19 PROCEDURE — 6370000000 HC RX 637 (ALT 250 FOR IP)

## 2022-10-19 PROCEDURE — 36415 COLL VENOUS BLD VENIPUNCTURE: CPT

## 2022-10-19 PROCEDURE — 80053 COMPREHEN METABOLIC PANEL: CPT

## 2022-10-19 PROCEDURE — 93971 EXTREMITY STUDY: CPT

## 2022-10-19 PROCEDURE — 85025 COMPLETE CBC W/AUTO DIFF WBC: CPT

## 2022-10-19 PROCEDURE — 93971 EXTREMITY STUDY: CPT | Performed by: RADIOLOGY

## 2022-10-19 RX ADMIN — APIXABAN 5 MG: 5 TABLET, FILM COATED ORAL at 20:16

## 2022-10-19 ASSESSMENT — PAIN DESCRIPTION - ORIENTATION: ORIENTATION: LEFT

## 2022-10-19 ASSESSMENT — PAIN - FUNCTIONAL ASSESSMENT: PAIN_FUNCTIONAL_ASSESSMENT: 0-10

## 2022-10-19 ASSESSMENT — PAIN SCALES - GENERAL: PAINLEVEL_OUTOF10: 5

## 2022-10-19 ASSESSMENT — PAIN DESCRIPTION - PAIN TYPE: TYPE: ACUTE PAIN

## 2022-10-19 ASSESSMENT — PAIN DESCRIPTION - FREQUENCY: FREQUENCY: CONTINUOUS

## 2022-10-19 ASSESSMENT — PAIN DESCRIPTION - LOCATION: LOCATION: LEG

## 2022-10-19 ASSESSMENT — PAIN DESCRIPTION - DESCRIPTORS: DESCRIPTORS: ACHING

## 2022-10-19 NOTE — ED PROVIDER NOTES
ATTENDING PROVIDER ATTESTATION:     Winston Oneal presented to the emergency department for evaluation of Leg Swelling (Left leg swelling and ecchymotic)   and was initially evaluated by the Medical Resident. See Original ED Note for H&P and ED course above. I have reviewed and discussed the case, including pertinent history (medical, surgical, family and social) and exam findings with the Medical Resident assigned to Winston Oneal. I have personally performed and/or participated in the history, exam, medical decision making, and procedures and agree with all pertinent clinical information and any additional changes or corrections are noted below in my assessment and plan. I have discussed this patient in detail with the resident, and provided the instruction and education,       I have reviewed my findings and recommendations with the assigned Medical Resident, Winston Oneal and members of family present at the time of disposition. I have performed a history and physical examination of this patient and directly supervised the resident caring for this patient      History of Present Illness:    Presents to the ED for left leg pain and swelling and bruising, beginning a few days ago. The complaint has been persistent, moderate in severity, and worsened by nothing. Patient reports that she suffered a trauma in the not too distant past, she reports that she also was diagnosed with a left lower leg DVT. She is on Xarelto. She reports that her leg has increased pain and swelling and ecchymosis. She is here for further evaluation. She denies fever or chills. No chest pain or shortness of breath. No abdominal pain. She denies any other complaints.         Review of Systems:   A complete review of systems was performed and pertinent positives and negatives are stated within HPI, all other systems reviewed and are negative.    --------------------------------------------- PAST HISTORY ---------------------------------------------  Past Medical History:  has a past medical history of Hyperlipidemia, Hypertension, and Thyroid disease. Past Surgical History:  has a past surgical history that includes Carpal tunnel release; Cholecystectomy; fracture surgery; Hysterectomy; Cataract removal; Skin cancer excision; and Coronary angioplasty with stent (05/07/2021). Social History:  reports that she has never smoked. She has never used smokeless tobacco. She reports that she does not drink alcohol and does not use drugs. Family History: family history is not on file. Unless otherwise noted, family history is non contributory    The patients home medications have been reviewed. Allergies: Latex, Aspirin, Andrés-allergin [diphenhydramine], Darvocet a500 [propoxyphene n-acetaminophen], Fish-derived products, Honey bee venom [bee venom], Lemon oil, Niacin and related, Nuts [peanut oil], Penicillins, Shellfish-derived products, and Wine [alcohol]    Physical Exam:  Constitutional/General: Alert and oriented x3  Head: Normocephalic and atraumatic  Eyes: PERRL, EOMI, sclera non icteric  ENT: Oropharynx clear, handling secretions  Neck: Supple, full ROM, no stridor, no meningeal signs  Respiratory: Lungs clear to auscultation bilaterally, no wheezes, rales, or rhonchi. Not in respiratory distress  Cardiovascular:  Regular rate. Regular rhythm. No murmurs, no gallops, no rubs. 2+ distal pulses. Equal extremity pulses. GI:  Abdomen Soft, Non tender, Non distended. No rebound, guarding, or rigidity. No pulsatile masses. Musculoskeletal: Moves all extremities x 4. Warm and well perfused,  no clubbing, no cyanosis,Palpable peripheral pulses. Left lower extremity is swollen compared to the right. There is some calf tenderness as well as bruising ecchymosis. She has dopplerable triphasic signal in both lower extremities. Compartments are soft and compressible. Neurovascularly intact distally.  2+ DP/PT pulses. Capillary refill <3 secondary. Warm and well perfused. Sural, saphenous, deep peroneal, peroneal, and tibial nerves intact. Sensation intact. 5/5 strength. Achilies tendon intact. No evidence of compartment syndrome. Integument: skin warm and dry. Multiple ecchymotic areas along the left lower leg as well as some on the right leg. The right knee has approximately a 1 cm x 1 cm area that is slightly erythematous and indurated but no fluctuance and no crepitus. There is a small abrasion along this that was existing prior to arrival.  This is along the knee but does not involve the joint. She has full flexion-extension of the knee without signs of septic joint. There is no joint effusion. Neurologic: GCS 15, no focal deficits  Psychiatric: Normal Affect      I directly supervised any procedures performed by the resident and was present for the procedure including all critical portions of the procedure           I, Dr. Robert Edmond, am the primary provider of record    My Medical Decision Making:         Slightly worsening DVT, no signs of compartment syndrome. No signs of acute limb ischemia. Has pulses in the lower extremity. Spoke with Dr. Ricco Piedra from Vascular surgery, discussed the case, he recommended changing to Eliquis and compression stockings for the leg and to follow-up in his clinic next week. No chest pain or shortness of breath. She was given this information, she expressed an understanding.         1. Deep vein thrombosis (DVT) of proximal vein of left lower extremity, unspecified chronicity ([facility] [address]            Tommy Toro MD  10/19/22 2009

## 2022-10-19 NOTE — DISCHARGE INSTRUCTIONS
Please take your new medications as they are prescribed. It is important that you follow through with this medication every day to prevent the resurgence of blood clots. We will touch base with the vascular surgeon Dr. Glen Morgan, who says that he will see you in his clinic next week. We have attached information in the discharge summary. We also recommend that you follow-up with your primary care physician to keep them updated on any health status changes in your medications. Please return the emergency room if you experience worsening of her symptoms, severe leg swelling, severe leg pain, severe shortness of breath, severe chest pain, severe abdominal pain, or fevers greater than 100.4 °F.

## 2022-10-19 NOTE — ED PROVIDER NOTES
[facility] [address]  Department of Emergency Medicine     Written by: Gregg Healy MD  Patient Name: Sonia Rodríguez  Attending Provider: Nikolai Tillman MD  Admit Date: 10/19/2022  5:31 PM  MRN: [id number]                   : 1945        Chief Complaint   Patient presents with    Leg Swelling     Left leg swelling and ecchymotic    - Chief complaint    The patient is a 77-year-old female with past medical history of CAD, atrial fibrillation on Xarelto, DVTs, CKD, hypertension, and hyperlipidemia presenting with left leg swelling and ecchymosis. Patient states that she was in the hospital last week after a fall and that she was sent home several days ago. This morning, the patient woke up and saw that she had a significant amount of discoloration to her left lower extremity and she has been noting increasing leg swelling of the same. Patient states that she has not been ambulating well since returning home and that she has been wearing compression stockings as she was told that the swelling is only gotten worse so she is coming in to be evaluated. Patient reports that she has been compliant with her Xarelto. Chart review reveals that the patient had a left lower extremity ultrasound which revealed a DVT on her prior hospitalization but that decision was made to continue Xarelto for the patient at that time. Patient denies smoking history, alcohol use, or drug use. Patient denies numbness, weakness, tingling, loss of sensation, severe pain in the limb, swelling in the other limb, urinary incontinence, bowel incontinence, saddle paresthesias, chest pain, shortness of breath, nausea, vomiting, hematuria, dysuria, increasing or decreasing urinary frequency, abdominal pain, blood in the stool, constipation, diarrhea, headache, fever, cough, sore throat, recent travel, recent illness, or sick contacts.            Review of Systems   Constitutional:  Negative for activity change, chills, fatigue and fever.   HENT:  Negative for congestion, postnasal drip, rhinorrhea, sinus pressure and sore throat. Eyes:  Negative for photophobia, discharge, redness and visual disturbance. Respiratory:  Negative for cough, shortness of breath and wheezing. Cardiovascular:  Positive for leg swelling. Negative for chest pain and palpitations. Gastrointestinal:  Negative for abdominal distention, abdominal pain, blood in stool, constipation, diarrhea, nausea and vomiting. Endocrine: Negative for cold intolerance and heat intolerance. Genitourinary:  Negative for decreased urine volume, difficulty urinating, dysuria, flank pain, frequency, hematuria, urgency, vaginal bleeding and vaginal discharge. Musculoskeletal:  Negative for arthralgias, back pain, joint swelling, myalgias, neck pain and neck stiffness. Skin:  Positive for color change. Negative for rash and wound. Allergic/Immunologic: Negative for immunocompromised state. Neurological:  Negative for dizziness, syncope, facial asymmetry, weakness, light-headedness, numbness and headaches. Hematological:  Does not bruise/bleed easily. Psychiatric/Behavioral:  Negative for behavioral problems and hallucinations. Physical Exam  Constitutional:       General: She is not in acute distress. Appearance: Normal appearance. She is not ill-appearing, toxic-appearing or diaphoretic. HENT:      Head: Normocephalic and atraumatic. Right Ear: Hearing normal.      Left Ear: Hearing normal.      Nose: Nose normal.      Mouth/Throat:      Lips: Pink. Mouth: Mucous membranes are moist.      Pharynx: Oropharynx is clear. Eyes:      Extraocular Movements: Extraocular movements intact. Conjunctiva/sclera: Conjunctivae normal.      Pupils: Pupils are equal, round, and reactive to light. Cardiovascular:      Rate and Rhythm: Normal rate and regular rhythm. Pulses:           Radial pulses are 2+ on the right side and 2+ on the left side. Dorsalis pedis pulses are detected w/ Doppler on the right side and detected w/ Doppler on the left side. Posterior tibial pulses are detected w/ Doppler on the right side and detected w/ Doppler on the left side. Heart sounds: S1 normal and S2 normal.      Comments: Patient's left lower extremity was significantly more swollen compared with the right. Patient had bilateral dopplerable pulses at the [facility] and PT. Patient had full sensation to the lower extremities with no evidence of flexor tenosynovitis or ischemic limb. Pulmonary:      Effort: Pulmonary effort is normal. No tachypnea, accessory muscle usage or respiratory distress. Breath sounds: Normal breath sounds and air entry. No decreased breath sounds, wheezing, rhonchi or rales. Abdominal:      General: Abdomen is flat. Bowel sounds are normal. There is no distension. Palpations: Abdomen is soft. There is no fluid wave or mass. Tenderness: There is no abdominal tenderness. There is no right CVA tenderness, left CVA tenderness or guarding. Musculoskeletal:         General: No swelling or tenderness. Normal range of motion. Cervical back: Normal range of motion and neck supple. No rigidity. Right lower le+ Pitting Edema present. Left lower leg: 3+ Pitting Edema present. Lymphadenopathy:      Cervical: No cervical adenopathy. Skin:     General: Skin is warm and dry. Capillary Refill: Capillary refill takes less than 2 seconds. Findings: No bruising, lesion or rash. Neurological:      General: No focal deficit present. Mental Status: She is alert and oriented to person, place, and time. Mental status is at baseline. Motor: No weakness. Psychiatric:         Attention and Perception: Attention normal.         Mood and Affect: Mood and affect normal.         Behavior: Behavior is cooperative.         Procedures       MDM  Number of Diagnoses or Management Options  Deep vein thrombosis (DVT) of proximal vein of left lower extremity, unspecified chronicity ([address]  Diagnosis management comments: The patient is a 75-year-old female with past medical history of CAD, atrial fibrillation on Xarelto, DVTs, CKD, hypertension, and hyperlipidemia presenting with left leg swelling and ecchymosis. At the time of initial examination, the patient is hemodynamically stable. Concern for unresolved or worsening DVT in setting of Xarelto in this patient. Labs and imaging reviewed as below. Case was discussed with Dr. Eda Braun, vascular surgeon, who recommends that we transition the patient to Eliquis and that he can see the patient at his clinic on Wednesday, 10/26/2022. The patient was educated about the results of her blood work and her imaging and demonstrated good understanding. Patient was explained how we will transition her from Xarelto to Eliquis and was provided with a coupon for 30-day prescription of Eliquis. Patient demonstrated good understanding and is comfortable with plan for outpatient follow-up with Dr. Eda Braun. At the time of discharge, the patient demonstrated good understanding of her condition, had no questions, and was hemodynamically stable.              --------------------------------------------- PAST HISTORY ---------------------------------------------  Past Medical History:  has a past medical history of Hyperlipidemia, Hypertension, and Thyroid disease. Past Surgical History:  has a past surgical history that includes Carpal tunnel release; Cholecystectomy; fracture surgery; Hysterectomy; Cataract removal; Skin cancer excision; and Coronary angioplasty with stent (05/07/2021). Social History:  reports that she has never smoked. She has never used smokeless tobacco. She reports that she does not drink alcohol and does not use drugs. Family History: family history is not on file. The patients home medications have been reviewed.     Allergies: Latex, Aspirin, Andrés-allergin [diphenhydramine], Darvocet a500 [propoxyphene n-acetaminophen], Fish-derived products, Honey bee venom [bee venom], Lemon oil, Niacin and related, Nuts [peanut oil], Penicillins, Shellfish-derived products, and Wine [alcohol]    -------------------------------------------------- RESULTS -------------------------------------------------  Labs:  Results for orders placed or performed during the hospital encounter of 10/19/22   CBC with Auto Differential   Result Value Ref Range    WBC 7.3 4.5 - 11.5 E9/L    RBC 3.59 3.50 - 5.50 E12/L    Hemoglobin 10.7 (L) 11.5 - 15.5 g/dL    Hematocrit 33.2 (L) 34.0 - 48.0 %    MCV 92.5 80.0 - 99.9 fL    MCH 29.8 26.0 - 35.0 pg    MCHC 32.2 32.0 - 34.5 %    RDW 14.6 11.5 - 15.0 fL    Platelets 661 291 - 796 E9/L    MPV 9.2 7.0 - 12.0 fL    Neutrophils % 71.1 43.0 - 80.0 %    Immature Granulocytes % 0.3 0.0 - 5.0 %    Lymphocytes % 15.8 (L) 20.0 - 42.0 %    Monocytes % 9.5 2.0 - 12.0 %    Eosinophils % 2.9 0.0 - 6.0 %    Basophils % 0.4 0.0 - 2.0 %    Neutrophils Absolute 5.21 1.80 - 7.30 E9/L    Immature Granulocytes # 0.02 E9/L    Lymphocytes Absolute 1.16 (L) 1.50 - 4.00 E9/L    Monocytes Absolute 0.70 0.10 - 0.95 E9/L    Eosinophils Absolute 0.21 0.05 - 0.50 E9/L    Basophils Absolute 0.03 0.00 - 0.20 E9/L   Comprehensive Metabolic Panel w/ Reflex to MG   Result Value Ref Range    Sodium 138 132 - 146 mmol/L    Potassium reflex Magnesium 3.7 3.5 - 5.0 mmol/L    Chloride 100 98 - 107 mmol/L    CO2 24 22 - 29 mmol/L    Anion Gap 14 7 - 16 mmol/L    Glucose 170 (H) 74 - 99 mg/dL    BUN 29 (H) 6 - 23 mg/dL    Creatinine 1.5 (H) 0.5 - 1.0 mg/dL    Est, Glom Filt Rate 35 >=60 mL/min/1.73    Calcium 9.5 8.6 - 10.2 mg/dL    Total Protein 7.1 6.4 - 8.3 g/dL    Albumin 4.0 3.5 - 5.2 g/dL    Total Bilirubin 0.8 0.0 - 1.2 mg/dL    Alkaline Phosphatase 99 35 - 104 U/L    ALT 8 0 - 32 U/L    AST 11 0 - 31 U/L       Radiology:  US DUP LOWER EXTREMITY LEFT FARZAD   Final Result   There is evidence for deep venous thrombosis      ALERT:  THIS IS AN ABNORMAL REPORT                   ------------------------- NURSING NOTES AND VITALS REVIEWED ---------------------------  Date / Time Roomed:  10/19/2022  5:31 PM  ED Bed Assignment:  13/13    The nursing notes within the ED encounter and vital signs as below have been reviewed. BP (!) 159/79   Pulse 85   Temp 97 °F (36.1 °C)   Resp 20   Wt 199 lb (90.3 kg)   SpO2 96%   BMI 33.32 kg/m²   Oxygen Saturation Interpretation: Normal      ------------------------------------------ PROGRESS NOTES ------------------------------------------  I have spoken with the patient and discussed todays results, in addition to providing specific details for the plan of care and counseling regarding the diagnosis and prognosis. Their questions are answered at this time and they are agreeable with the plan. I discussed at length with them reasons for immediate return here for re evaluation. They will followup with primary care by calling their office tomorrow. --------------------------------- ADDITIONAL PROVIDER NOTES ---------------------------------  At this time the patient is without objective evidence of an acute process requiring hospitalization or inpatient management. They have remained hemodynamically stable throughout their entire ED visit and are stable for discharge with outpatient follow-up. The plan has been discussed in detail and they are aware of the specific conditions for emergent return, as well as the importance of follow-up. Discharge Medication List as of 10/20/2022  9:58 AM          Diagnosis:  1. Deep vein thrombosis (DVT) of proximal vein of left lower extremity, unspecified chronicity ([address]        Disposition:  Patient's disposition: Discharge to home  Patient's condition is stable.         Patient was seen and evaluated by myself and my attending Liz Oconnor MD. Assessment and Plan discussed with attending provider, please see attestation for final plan of care.      MD Emanuel Ackerman MD  Resident  10/20/22 2269

## 2022-10-19 NOTE — ED NOTES
Pt has significant swelling and bruising noted to left lower leg, starting at her knee and going down. Pt complains of left calf pain.       Marilou Bustamante RN  10/19/22 1984

## 2022-10-20 ASSESSMENT — ENCOUNTER SYMPTOMS
WHEEZING: 0
ABDOMINAL PAIN: 0
CONSTIPATION: 0
DIARRHEA: 0
PHOTOPHOBIA: 0
BACK PAIN: 0
VOMITING: 0
EYE REDNESS: 0
SHORTNESS OF BREATH: 0
RHINORRHEA: 0
NAUSEA: 0
COUGH: 0
SORE THROAT: 0
SINUS PRESSURE: 0
ABDOMINAL DISTENTION: 0
COLOR CHANGE: 1
EYE DISCHARGE: 0
BLOOD IN STOOL: 0

## 2022-10-25 ENCOUNTER — OFFICE VISIT (OUTPATIENT)
Dept: SURGERY | Age: 77
End: 2022-10-25
Payer: MEDICARE

## 2022-10-25 VITALS
TEMPERATURE: 97.4 F | HEART RATE: 105 BPM | BODY MASS INDEX: 33.97 KG/M2 | WEIGHT: 199 LBS | SYSTOLIC BLOOD PRESSURE: 139 MMHG | RESPIRATION RATE: 15 BRPM | DIASTOLIC BLOOD PRESSURE: 78 MMHG | HEIGHT: 64 IN | OXYGEN SATURATION: 100 %

## 2022-10-25 DIAGNOSIS — S22.42XA CLOSED FRACTURE OF MULTIPLE RIBS OF LEFT SIDE, INITIAL ENCOUNTER: Primary | ICD-10-CM

## 2022-10-25 PROCEDURE — G8417 CALC BMI ABV UP PARAM F/U: HCPCS | Performed by: NURSE PRACTITIONER

## 2022-10-25 PROCEDURE — 3078F DIAST BP <80 MM HG: CPT | Performed by: NURSE PRACTITIONER

## 2022-10-25 PROCEDURE — 3074F SYST BP LT 130 MM HG: CPT | Performed by: NURSE PRACTITIONER

## 2022-10-25 PROCEDURE — G8484 FLU IMMUNIZE NO ADMIN: HCPCS | Performed by: NURSE PRACTITIONER

## 2022-10-25 PROCEDURE — 99212 OFFICE O/P EST SF 10 MIN: CPT | Performed by: NURSE PRACTITIONER

## 2022-10-25 PROCEDURE — 1111F DSCHRG MED/CURRENT MED MERGE: CPT | Performed by: NURSE PRACTITIONER

## 2022-10-25 PROCEDURE — G8427 DOCREV CUR MEDS BY ELIG CLIN: HCPCS | Performed by: NURSE PRACTITIONER

## 2022-10-25 PROCEDURE — 1123F ACP DISCUSS/DSCN MKR DOCD: CPT | Performed by: NURSE PRACTITIONER

## 2022-10-25 PROCEDURE — 99213 OFFICE O/P EST LOW 20 MIN: CPT | Performed by: NURSE PRACTITIONER

## 2022-10-25 PROCEDURE — G8400 PT W/DXA NO RESULTS DOC: HCPCS | Performed by: NURSE PRACTITIONER

## 2022-10-25 PROCEDURE — 1036F TOBACCO NON-USER: CPT | Performed by: NURSE PRACTITIONER

## 2022-10-25 PROCEDURE — 1090F PRES/ABSN URINE INCON ASSESS: CPT | Performed by: NURSE PRACTITIONER

## 2022-10-26 NOTE — PROGRESS NOTES
Trauma Clinic Progress Note   10/26/2022     Date of injury: 10/10         NORM/Injuries:   Patient Active Problem List   Diagnosis Code    CKD (chronic kidney disease) N18.9    Heart murmur R01.1    Chest pain, atypical R07.89    Essential hypertension I10    Aspirin allergy Z88.8    Allergy to statin medication Z88.8    Non-rheumatic mitral regurgitation I34.0    Pericardial effusion I31.39    Obesity (BMI 35.0-39.9 without comorbidity) E66.9    Edema of both feet R60.0    Chest pain C26.6    Metabolic acidosis L78.03    Allergy to multiple drugs Z88.9    Hypothyroid E03.9    Grade I diastolic dysfunction W62.38    HLD (hyperlipidemia) E78.5    NSTEMI (non-ST elevated myocardial infarction) (HCC) I21.4    Shortness of breath R06.02    Trauma T14.90XA    Acute deep vein thrombosis (DVT) of tibial vein of left lower extremity (HCC) I82.442    Closed fracture of multiple ribs of left side S22.42XA    Anticoagulation management encounter Z51.81, Z79.01    Fall W19. XXXA    Coronary artery disease involving native coronary artery of native heart with angina pectoris (HCC) I25.119    Longstanding persistent atrial fibrillation (HCC) I48.11    DVT (deep vein thrombosis) in pregnancy O22.30       Surgeries:   Past Surgical History:   Procedure Laterality Date    CARPAL TUNNEL RELEASE      CATARACT REMOVAL      CHOLECYSTECTOMY      CORONARY ANGIOPLASTY WITH STENT PLACEMENT  05/07/2021    Dr Caren Olguin LAD    FRACTURE SURGERY      HYSTERECTOMY (CERVIX STATUS UNKNOWN)      SKIN CANCER EXCISION      rt arm       Vital signs:    /78   Pulse (!) 105   Temp 97.4 °F (36.3 °C) (Temporal)   Resp 15   Ht 5' 4\" (1.626 m)   Wt 199 lb (90.3 kg)   SpO2 100%   BMI 34.16 kg/m²     Medications:    Prior to Admission medications    Medication Sig Start Date End Date Taking? Authorizing Provider   apixaban (ELIQUIS) 5 MG TABS tablet 10mg BID for 7 days, then 5mg BID. Disp QS for 30 days, Refill Zero.  10/20/22  Yes Laney Lagunas DO   lidocaine 4 % external patch Place 1 patch onto the skin daily for 14 days 10/13/22 10/27/22 Yes ANGELIQUE Morgan - NP   tamsulosin (FLOMAX) 0.4 MG capsule Take 1 capsule by mouth daily for 14 days 10/13/22 10/27/22 Yes Karthik Fajardo NathanANGELIQUE Hurt - TONJA   hydroCHLOROthiazide (HYDRODIURIL) 25 MG tablet Take 25 mg by mouth daily 5/31/22  Yes Historical Provider, MD   clopidogrel (PLAVIX) 75 MG tablet Take 1 tablet by mouth daily 5/4/22  Yes Philly Edmond MD   metoprolol succinate (TOPROL XL) 50 MG extended release tablet Take 1 tablet by mouth daily 5/9/21  Yes Shadi Russo MD   isosorbide mononitrate (IMDUR) 30 MG extended release tablet Take 1 tablet by mouth daily 5/9/21  Yes Shadi Russo MD   atorvastatin (LIPITOR) 40 MG tablet Take 1 tablet by mouth nightly 5/8/21  Yes Shadi Russo MD   amLODIPine (NORVASC) 10 MG tablet Take 10 mg by mouth daily   Yes Historical Provider, MD          CC:  Trauma follow up Fall    Patient presents to the clinic today for follow up on fall in which she sustained left rib fx. Since being home she was diagnosed with a DVE while on Xarelto was switched to Eliquis. Since that time she has not had any further complaints. Pain controled, sleeping well and appetite has been good. Headache no  Dizziness. /Off balance no  Nausea/vomiting no  Forgetful or poor memory no  Poor concentration or in a fog no  Vision changes:  Blurred no. Double no. Light sensitivity no. Changes in sleep or more fatigued no      Physical Exam   Physical Exam  HENT:      Head: Normocephalic. Cardiovascular:      Rate and Rhythm: Normal rate. Pulmonary:      Effort: Pulmonary effort is normal. No respiratory distress. Breath sounds: No stridor. No wheezing, rhonchi or rales. Chest:      Chest wall: No tenderness. Musculoskeletal:      Comments: LLE edema   Skin:     General: Skin is warm and dry.    Neurological:      Mental Status: She is alert and oriented to person, place, and time.       Education   Instructed to continue to use incentive spirometry 6-8 times per day. Instructed to increase activity. Instructed on concussion:  causes, definition, s&s, treatment, course of concussion. Instructed on opioid medications. Anxiety no   Depression no    Nightmares no  Inability of Difficulty to leave the Home no    Startled by Loud Noises no      Assessment  Patient Active Problem List   Diagnosis Code    CKD (chronic kidney disease) N18.9    Heart murmur R01.1    Chest pain, atypical R07.89    Essential hypertension I10    Aspirin allergy Z88.8    Allergy to statin medication Z88.8    Non-rheumatic mitral regurgitation I34.0    Pericardial effusion I31.39    Obesity (BMI 35.0-39.9 without comorbidity) E66.9    Edema of both feet R60.0    Chest pain X03.6    Metabolic acidosis O72.18    Allergy to multiple drugs Z88.9    Hypothyroid E03.9    Grade I diastolic dysfunction A29.42    HLD (hyperlipidemia) E78.5    NSTEMI (non-ST elevated myocardial infarction) ([facility]) I21.4    Shortness of breath R06.02    Trauma T14.90XA    Acute deep vein thrombosis (DVT) of tibial vein of left lower extremity (HCC) I82.442    Closed fracture of multiple ribs of left side S22.42XA    Anticoagulation management encounter Z51.81, Z79.01    Fall W19. XXXA    Coronary artery disease involving native coronary artery of native heart with angina pectoris (HCC) I25.119    Longstanding persistent atrial fibrillation (HCC) I48.11    DVT (deep vein thrombosis) in pregnancy O18.29     76year old female following up for fall, doing very well.      Plan  RTC PRN      Future Appointments   Date Time Provider Bryant Maldonado   11/2/2022  2:15 PM Gregory Pleitez MD [facility]     Total Time: 20 minutes

## 2022-11-01 ENCOUNTER — TELEPHONE (OUTPATIENT)
Dept: VASCULAR SURGERY | Age: 77
End: 2022-11-01

## 2022-11-02 ENCOUNTER — OFFICE VISIT (OUTPATIENT)
Dept: VASCULAR SURGERY | Age: 77
End: 2022-11-02
Payer: MEDICARE

## 2022-11-02 VITALS — HEIGHT: 64 IN | WEIGHT: 200 LBS | BODY MASS INDEX: 34.15 KG/M2

## 2022-11-02 DIAGNOSIS — I82.442 ACUTE DEEP VEIN THROMBOSIS (DVT) OF TIBIAL VEIN OF LEFT LOWER EXTREMITY (HCC): Primary | ICD-10-CM

## 2022-11-02 PROCEDURE — 99204 OFFICE O/P NEW MOD 45 MIN: CPT | Performed by: SURGERY

## 2022-11-02 PROCEDURE — G8427 DOCREV CUR MEDS BY ELIG CLIN: HCPCS | Performed by: SURGERY

## 2022-11-02 PROCEDURE — G8400 PT W/DXA NO RESULTS DOC: HCPCS | Performed by: SURGERY

## 2022-11-02 PROCEDURE — 1123F ACP DISCUSS/DSCN MKR DOCD: CPT | Performed by: SURGERY

## 2022-11-02 PROCEDURE — 1111F DSCHRG MED/CURRENT MED MERGE: CPT | Performed by: SURGERY

## 2022-11-02 PROCEDURE — 1090F PRES/ABSN URINE INCON ASSESS: CPT | Performed by: SURGERY

## 2022-11-02 PROCEDURE — G8417 CALC BMI ABV UP PARAM F/U: HCPCS | Performed by: SURGERY

## 2022-11-02 PROCEDURE — 1036F TOBACCO NON-USER: CPT | Performed by: SURGERY

## 2022-11-02 PROCEDURE — G8484 FLU IMMUNIZE NO ADMIN: HCPCS | Performed by: SURGERY

## 2022-11-02 NOTE — PROGRESS NOTES
Vascular Surgery Outpatient Consultation      Chief Complaint   Patient presents with    Circulatory Problem     New pt. F/u hospital  left leg blood clot       Reason for Consult:  DVT    Requesting Physician:  Emergency Dept    HISTORY OF PRESENT ILLNESS:                The patient is a 76 y.o. female who is referred for evaluation of LLE tibial DVT. Pt had a mechanical fall about 1 month ago landing on her left side. She developed L calf swelling and pain shortly following the fall and was diagnosed with nonocclusive DVT in the L PTV. She was on eliquis at that time for a fib and states she was compliant. Dr Kari Primrose saw her while in the hospital and she was transitioned to [place] [id number]. Once discharged from the hospital, she went back to the ED for worsening L calf pain and swelling. LLE venous US showing slight propagation of the DVT and she was transitioned back to eliquis. She states the pain and edema have improved. She lives alone but her and her daughter state she is compliant with her eliquis. She has no previous history of dvt. She denies any chest pain or SOB. Past Medical History:        Diagnosis Date    DVT (deep venous thrombosis) (HCC)     Hyperlipidemia     Hypertension     Leg pain     Thyroid disease      Past Surgical History:        Procedure Laterality Date    CARPAL TUNNEL RELEASE      CATARACT REMOVAL      CHOLECYSTECTOMY      CORONARY ANGIOPLASTY WITH STENT PLACEMENT  05/07/2021    Dr Katie Munoz LAD    FRACTURE SURGERY      HYSTERECTOMY (CERVIX STATUS UNKNOWN)      SKIN CANCER EXCISION      rt arm     Current Medications:   Prior to Admission medications    Medication Sig Start Date End Date Taking? Authorizing Provider   apixaban (ELIQUIS) 5 MG TABS tablet 10mg BID for 7 days, then 5mg BID. Disp QS for 30 days, Refill Zero.  10/20/22  Yes Danyell Maharaj DO   hydroCHLOROthiazide (HYDRODIURIL) 25 MG tablet Take 25 mg by mouth daily 5/31/22  Yes Historical Provider, MD   clopidogrel (PLAVIX) 75 MG tablet Take 1 tablet by mouth daily 5/4/22  Yes Carlitos Turner MD   metoprolol succinate (TOPROL XL) 50 MG extended release tablet Take 1 tablet by mouth daily 5/9/21  Yes Jaelyn Booker MD   isosorbide mononitrate (IMDUR) 30 MG extended release tablet Take 1 tablet by mouth daily 5/9/21  Yes Jaelyn Booker MD   atorvastatin (LIPITOR) 40 MG tablet Take 1 tablet by mouth nightly 5/8/21  Yes Jaelyn Booker MD   amLODIPine (NORVASC) 10 MG tablet Take 10 mg by mouth daily   Yes Historical Provider, MD   tamsulosin (FLOMAX) 0.4 MG capsule Take 1 capsule by mouth daily for 14 days 10/13/22 10/27/22  ANGELIQUE Cline NP     Allergies:  Latex, Aspirin, Andrés-allergin [diphenhydramine], Darvocet a500 [propoxyphene n-acetaminophen], Fish-derived products, Honey bee venom [bee venom], Lemon oil, Niacin and related, Nuts [peanut oil], Penicillins, Shellfish-derived products, and Wine [alcohol]    Social History     Socioeconomic History    Marital status:      Spouse name: Not on file    Number of children: Not on file    Years of education: Not on file    Highest education level: Not on file   Occupational History    Not on file   Tobacco Use    Smoking status: Never    Smokeless tobacco: Never   Vaping Use    Vaping Use: Never used   Substance and Sexual Activity    Alcohol use: No     Comment: drinks 2 cups of coffee daily    Drug use: No    Sexual activity: Not on file   Other Topics Concern    Not on file   Social History Narrative    Not on file     Social Determinants of Health     Financial Resource Strain: Not on file   Food Insecurity: Not on file   Transportation Needs: Not on file   Physical Activity: Not on file   Stress: Not on file   Social Connections: Not on file   Intimate Partner Violence: Not on file   Housing Stability: Not on file        No family history on file.     REVIEW OF SYSTEMS (New symptoms):    Eyes:      Blurred vision:  No [x]/Yes []               Diplopia:   No [x]/Yes [] Vision loss:       No [x]/Yes []   Ears, nose, throat:             Hearing loss:    No [x]/Yes []      Vertigo:   No [x]/Yes []                       Swallowing problem:  No [x]/Yes []               Nose bleeds:   No [x]/Yes []      Voice hoarseness:  No [x]/Yes []  Respiratory:             Cough:   No [x]/Yes []      Pleuritic chest pain:  No [x]/Yes []                        Dyspnea:   No [x]/Yes []      Wheezing:   No [x]/Yes []  Cardiovascular:             Angina:   No [x]/Yes []      Palpitations:   No [x]/Yes []          Claudication:    No [x]/Yes []      Leg swelling:   No []/Yes [x]  Gastrointestinal:             Nausea or vomiting:  No [x]/Yes []               Abdominal pain:  No [x]/Yes []                     Intestinal bleeding: No [x]/Yes []  Musculoskeletal:             Leg pain:   No []/Yes [x]      Back pain:   No [x]/Yes []                    Weakness:   No [x]/Yes []  Neurologic:             Numbness:   No [x]/Yes []      Paralysis:   No [x]/Yes []                       Headaches:   No [x]/Yes []  Hematologic, lymphatic:   Anemia:   No [x]/Yes []              Bleeding or bruising:  No [x]/Yes []              Fevers or chills: No [x]/Yes []  Endocrine:             Temp intolerance:   No [x]/Yes []                       Polydipsia, polyuria:  No [x]/Yes []  Skin:              Rash:    No [x]/Yes []      Ulcers:   No [x]/Yes []              Abnorm pigment: No [x]/Yes []  :              Frequency/urgency:  No [x]/Yes []      Hematuria:    No [x]/Yes []                      Incontinence:    No [x]/Yes []    PHYSICAL EXAM:  There were no vitals filed for this visit.   General Appearance: alert and oriented to person, place and time, well developed and well- nourished, in no acute distress  Skin: warm and dry, no rash or erythema  Head: normocephalic and atraumatic  Eyes: extraocular eye movements intact, conjunctivae normal  ENT: external ear and ear canal normal bilaterally, nose without deformity  Pulmonary/Chest: clear to auscultation bilaterally- no wheezes, rales or rhonchi, normal air movement, no respiratory distress  Cardiovascular: normal rate, regular rhythm, normal S1 and S2, no murmurs, no carotid bruits  Abdomen: soft, non-tender, non-distended, normal bowel sounds, no masses or organomegaly  Musculoskeletal: normal range of motion, no joint swelling, deformity or tenderness  Neurologic: no cranial nerve deficit, gait, coordination and speech normal  Extremities: feet warm and pink, no discoloration. mild edema to the L calf; no erythema or increased warmth, no palpable cords, + motor and sensation    PULSE EXAM      Right      Left   Brachial     Radial 2 2   Femoral     Popliteal     Dorsalis Pedis triphasic biphasic   Posterior Tibial biphasic biphasic   (3=normal, 2=diminished, 1=barely palpable, 4=widened)      Problem List Items Addressed This Visit          Circulatory    Acute deep vein thrombosis (DVT) of tibial vein of left lower extremity (HCC) - Primary    Relevant Orders    Felipe Escobedo MD, Hematology and Oncology, [place] ([facility])     I reviewed the imaging with the patient and her daughter. Her provoking event was trauma related. They state she is compliant with her eliquis. Given this, it is surprising she would develop a DVT on the medication. The DVT propagated after being switched to xarelto and she was then again placed on eliquis. She may require coumadin therapy. She was seen by hematology in the hospital. I will refer her to their office for their opinion regarding anticoagulation. I discussed to begin wearing light otc compression stockings and elevate the legs. We will see her back in 3 months to see how she is doing. I discussed to call sooner with any new or worsening symptoms. Plan discussed with Dr Jennie Renteria PA-C    Return in about 3 months (around 2/2/2023).
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
## 2023-02-07 ENCOUNTER — TELEPHONE (OUTPATIENT)
Dept: VASCULAR SURGERY | Age: 78
End: 2023-02-07

## 2023-04-26 ENCOUNTER — HOSPITAL ENCOUNTER (INPATIENT)
Age: 78
LOS: 2 days | Discharge: HOME | End: 2023-04-28
Attending: STUDENT IN AN ORGANIZED HEALTH CARE EDUCATION/TRAINING PROGRAM | Admitting: INTERNAL MEDICINE
Payer: MEDICARE

## 2023-04-26 ENCOUNTER — APPOINTMENT (OUTPATIENT)
Dept: CT IMAGING | Age: 78
End: 2023-04-26
Payer: MEDICARE

## 2023-04-26 ENCOUNTER — APPOINTMENT (OUTPATIENT)
Dept: GENERAL RADIOLOGY | Age: 78
End: 2023-04-26
Payer: MEDICARE

## 2023-04-26 DIAGNOSIS — N39.0 URINARY TRACT INFECTION WITHOUT HEMATURIA, SITE UNSPECIFIED: ICD-10-CM

## 2023-04-26 DIAGNOSIS — R27.0 ATAXIA: Primary | ICD-10-CM

## 2023-04-26 DIAGNOSIS — R42 LIGHTHEADED: ICD-10-CM

## 2023-04-26 PROBLEM — R29.90 STROKE-LIKE SYMPTOMS: Status: ACTIVE | Noted: 2023-04-26

## 2023-04-26 LAB
ALBUMIN SERPL-MCNC: 4 G/DL (ref 3.5–5.2)
ALP SERPL-CCNC: 74 U/L (ref 35–104)
ALT SERPL-CCNC: 12 U/L (ref 0–32)
ANION GAP SERPL CALCULATED.3IONS-SCNC: 14 MMOL/L (ref 7–16)
AST SERPL-CCNC: 20 U/L (ref 0–31)
BACTERIA URNS QL MICRO: ABNORMAL /HPF
BASOPHILS # BLD: 0.04 E9/L (ref 0–0.2)
BASOPHILS NFR BLD: 0.6 % (ref 0–2)
BILIRUB SERPL-MCNC: 0.6 MG/DL (ref 0–1.2)
BILIRUB UR QL STRIP: NEGATIVE
BUN SERPL-MCNC: 21 MG/DL (ref 6–23)
CALCIUM SERPL-MCNC: 9.2 MG/DL (ref 8.6–10.2)
CHLORIDE SERPL-SCNC: 105 MMOL/L (ref 98–107)
CLARITY UR: ABNORMAL
CO2 SERPL-SCNC: 21 MMOL/L (ref 22–29)
COLOR UR: YELLOW
CREAT SERPL-MCNC: 1.6 MG/DL (ref 0.5–1)
EKG ATRIAL RATE: 94 BPM
EKG Q-T INTERVAL: 386 MS
EKG QRS DURATION: 88 MS
EKG QTC CALCULATION (BAZETT): 492 MS
EKG R AXIS: 44 DEGREES
EKG T AXIS: -12 DEGREES
EKG VENTRICULAR RATE: 98 BPM
EOSINOPHIL # BLD: 0.19 E9/L (ref 0.05–0.5)
EOSINOPHIL NFR BLD: 3 % (ref 0–6)
EPI CELLS #/AREA URNS HPF: ABNORMAL /HPF
ERYTHROCYTE [DISTWIDTH] IN BLOOD BY AUTOMATED COUNT: 14.6 FL (ref 11.5–15)
GLUCOSE SERPL-MCNC: 163 MG/DL (ref 74–99)
GLUCOSE UR STRIP-MCNC: NEGATIVE MG/DL
HCT VFR BLD AUTO: 40.7 % (ref 34–48)
HGB BLD-MCNC: 12.7 G/DL (ref 11.5–15.5)
HGB UR QL STRIP: ABNORMAL
IMM GRANULOCYTES # BLD: 0.02 E9/L
IMM GRANULOCYTES NFR BLD: 0.3 % (ref 0–5)
KETONES UR STRIP-MCNC: NEGATIVE MG/DL
LEUKOCYTE ESTERASE UR QL STRIP: ABNORMAL
LYMPHOCYTES # BLD: 1.44 E9/L (ref 1.5–4)
LYMPHOCYTES NFR BLD: 22.8 % (ref 20–42)
MCH RBC QN AUTO: 30 PG (ref 26–35)
MCHC RBC AUTO-ENTMCNC: 31.2 % (ref 32–34.5)
MCV RBC AUTO: 96 FL (ref 80–99.9)
MONOCYTES # BLD: 0.49 E9/L (ref 0.1–0.95)
MONOCYTES NFR BLD: 7.8 % (ref 2–12)
NEUTROPHILS # BLD: 4.13 E9/L (ref 1.8–7.3)
NEUTS SEG NFR BLD: 65.5 % (ref 43–80)
NITRITE UR QL STRIP: POSITIVE
PH UR STRIP: 5.5 [PH] (ref 5–9)
PLATELET # BLD AUTO: 167 E9/L (ref 130–450)
PMV BLD AUTO: 10.2 FL (ref 7–12)
POTASSIUM SERPL-SCNC: 5 MMOL/L (ref 3.5–5)
PROT SERPL-MCNC: 6.9 G/DL (ref 6.4–8.3)
PROT UR STRIP-MCNC: NEGATIVE MG/DL
RBC # BLD AUTO: 4.24 E12/L (ref 3.5–5.5)
RBC #/AREA URNS HPF: ABNORMAL /HPF (ref 0–2)
REASON FOR REJECTION: NORMAL
REJECTED TEST: NORMAL
SODIUM SERPL-SCNC: 140 MMOL/L (ref 132–146)
SP GR UR STRIP: 1.02 (ref 1–1.03)
TROPONIN, HIGH SENSITIVITY: 21 NG/L (ref 0–9)
TROPONIN, HIGH SENSITIVITY: 23 NG/L (ref 0–9)
UROBILINOGEN UR STRIP-ACNC: 0.2 E.U./DL
WBC # BLD: 6.3 E9/L (ref 4.5–11.5)
WBC #/AREA URNS HPF: >20 /HPF (ref 0–5)

## 2023-04-26 PROCEDURE — 6360000002 HC RX W HCPCS: Performed by: STUDENT IN AN ORGANIZED HEALTH CARE EDUCATION/TRAINING PROGRAM

## 2023-04-26 PROCEDURE — 2580000003 HC RX 258: Performed by: INTERNAL MEDICINE

## 2023-04-26 PROCEDURE — 81001 URINALYSIS AUTO W/SCOPE: CPT

## 2023-04-26 PROCEDURE — 80053 COMPREHEN METABOLIC PANEL: CPT

## 2023-04-26 PROCEDURE — 2060000000 HC ICU INTERMEDIATE R&B

## 2023-04-26 PROCEDURE — 70450 CT HEAD/BRAIN W/O DYE: CPT

## 2023-04-26 PROCEDURE — 85025 COMPLETE CBC W/AUTO DIFF WBC: CPT

## 2023-04-26 PROCEDURE — 84484 ASSAY OF TROPONIN QUANT: CPT

## 2023-04-26 PROCEDURE — 6370000000 HC RX 637 (ALT 250 FOR IP): Performed by: INTERNAL MEDICINE

## 2023-04-26 PROCEDURE — 99285 EMERGENCY DEPT VISIT HI MDM: CPT

## 2023-04-26 PROCEDURE — 93005 ELECTROCARDIOGRAM TRACING: CPT | Performed by: PHYSICIAN ASSISTANT

## 2023-04-26 PROCEDURE — 71046 X-RAY EXAM CHEST 2 VIEWS: CPT

## 2023-04-26 PROCEDURE — 36415 COLL VENOUS BLD VENIPUNCTURE: CPT

## 2023-04-26 PROCEDURE — 93010 ELECTROCARDIOGRAM REPORT: CPT | Performed by: INTERNAL MEDICINE

## 2023-04-26 PROCEDURE — 2580000003 HC RX 258: Performed by: STUDENT IN AN ORGANIZED HEALTH CARE EDUCATION/TRAINING PROGRAM

## 2023-04-26 RX ORDER — SODIUM CHLORIDE 0.9 % (FLUSH) 0.9 %
10 SYRINGE (ML) INJECTION PRN
Status: DISCONTINUED | OUTPATIENT
Start: 2023-04-26 | End: 2023-04-28 | Stop reason: HOSPADM

## 2023-04-26 RX ORDER — ACETAMINOPHEN 650 MG/1
650 SUPPOSITORY RECTAL EVERY 6 HOURS PRN
Status: DISCONTINUED | OUTPATIENT
Start: 2023-04-26 | End: 2023-04-28 | Stop reason: HOSPADM

## 2023-04-26 RX ORDER — ATORVASTATIN CALCIUM 40 MG/1
40 TABLET, FILM COATED ORAL NIGHTLY
Status: DISCONTINUED | OUTPATIENT
Start: 2023-04-26 | End: 2023-04-28 | Stop reason: HOSPADM

## 2023-04-26 RX ORDER — CLOPIDOGREL BISULFATE 75 MG/1
75 TABLET ORAL DAILY
Status: DISCONTINUED | OUTPATIENT
Start: 2023-04-27 | End: 2023-04-28 | Stop reason: HOSPADM

## 2023-04-26 RX ORDER — SODIUM CHLORIDE 0.9 % (FLUSH) 0.9 %
10 SYRINGE (ML) INJECTION EVERY 12 HOURS SCHEDULED
Status: DISCONTINUED | OUTPATIENT
Start: 2023-04-26 | End: 2023-04-28 | Stop reason: HOSPADM

## 2023-04-26 RX ORDER — ACETAMINOPHEN 325 MG/1
650 TABLET ORAL EVERY 6 HOURS PRN
Status: DISCONTINUED | OUTPATIENT
Start: 2023-04-26 | End: 2023-04-28 | Stop reason: HOSPADM

## 2023-04-26 RX ORDER — SODIUM CHLORIDE 9 MG/ML
INJECTION, SOLUTION INTRAVENOUS PRN
Status: DISCONTINUED | OUTPATIENT
Start: 2023-04-26 | End: 2023-04-28 | Stop reason: HOSPADM

## 2023-04-26 RX ADMIN — APIXABAN 5 MG: 5 TABLET, FILM COATED ORAL at 22:56

## 2023-04-26 RX ADMIN — WATER 1000 MG: 1 INJECTION INTRAMUSCULAR; INTRAVENOUS; SUBCUTANEOUS at 18:31

## 2023-04-26 RX ADMIN — SODIUM CHLORIDE, PRESERVATIVE FREE 10 ML: 5 INJECTION INTRAVENOUS at 22:57

## 2023-04-26 ASSESSMENT — ENCOUNTER SYMPTOMS
NAUSEA: 0
VOMITING: 0
EYE REDNESS: 0
DIARRHEA: 0
BACK PAIN: 0
EYE DISCHARGE: 0
WHEEZING: 0
SORE THROAT: 0
ABDOMINAL DISTENTION: 0
EYE PAIN: 0
COUGH: 0
SHORTNESS OF BREATH: 0
SINUS PRESSURE: 0

## 2023-04-26 NOTE — ED PROVIDER NOTES
HPI     68-year-old female presents emergency department complaint of lightheadedness and dizziness especially when she stands up to walk. She states she is unable to walk for the past day because she feels like she is very unsteady on her feet and is going to. Denies any falls or injuries denies any chest pain shortness breath denies any nose. States she is also had trouble with her vision blurry vision for past 2 weeks is following with an eye doctor for that. She states 3 weeks ago she did lose vision in both of her eyes that then resolved. Has not happened again. She otherwise denies any other acute complaints at this time. Review of Systems   Constitutional:  Negative for chills and fever. HENT:  Negative for ear pain, sinus pressure and sore throat. Eyes:  Positive for visual disturbance. Negative for pain, discharge and redness. Respiratory:  Negative for cough, shortness of breath and wheezing. Cardiovascular:  Negative for chest pain. Gastrointestinal:  Negative for abdominal distention, diarrhea, nausea and vomiting. Genitourinary:  Negative for dysuria and frequency. Musculoskeletal:  Negative for arthralgias and back pain. Skin:  Negative for rash and wound. Neurological:  Positive for light-headedness. Negative for weakness and headaches. Hematological:  Negative for adenopathy. All other systems reviewed and are negative. Physical Exam  Vitals and nursing note reviewed. Constitutional:       Appearance: Normal appearance. She is normal weight. HENT:      Head: Normocephalic and atraumatic. Eyes:      Conjunctiva/sclera: Conjunctivae normal.   Cardiovascular:      Rate and Rhythm: Normal rate and regular rhythm. Pulses: Normal pulses. Heart sounds: Normal heart sounds. No murmur heard. No gallop. Pulmonary:      Effort: Pulmonary effort is normal. No respiratory distress. Breath sounds: Normal breath sounds. No wheezing or rales.

## 2023-04-26 NOTE — ED NOTES
Department of Emergency Medicine  FIRST PROVIDER TRIAGE NOTE             Independent MLP           4/26/23  11:56 AM EDT    Date of Encounter: 4/26/23   MRN: [id number]      HPI: Mireille Monzon is a 76 y.o. female who presents to the ED for Dizziness (Patient c/o vision changes for 2 weeks, left arm pain and dizziness that started today), Arm Pain, and Eye Problem     Pt presenting with left arm pain and dizziness starting this morning. Pt also c/o decreased vision for a few weeks    ROS: Negative for vomiting or diarrhea. PE: Gen Appearance/Constitutional: alert  HEENT: NC/NT. PERRLA,  Airway patent. Initial Plan of Care: All treatment areas with department are currently occupied. Plan to order/Initiate the following while awaiting opening in ED: labs, EKG, and imaging studies.   Initiate Treatment-Testing, Proceed toTreatment Area When Bed Available for ED Attending/MLP to Continue Care    Electronically signed by Shabana Aldrich PA-C   DD: 4/26/23      Shabana Aldrich PA-C  04/26/23 9918

## 2023-04-26 NOTE — H&P
Hospitalist History & Physical      PATIENT NAME:  Yolande Garvey    MRN:  [id number]  SERVICE DATE:  04/26/23    Primary Care Physician: Ben Ryan DO       SUBJECTIVE  CHIEF COMPLAINT:  had concerns including Dizziness (Patient c/o vision changes for 2 weeks, left arm pain and dizziness that started today), Arm Pain, and Eye Problem. HPI:  Ms. Yolande Garvey, a 76y.o. year old female  who  has a past medical history of DVT (deep venous thrombosis) ([facility] [address] Hyperlipidemia, Hypertension, Leg pain, and Thyroid disease. presents with past 3 days, dizzy, lightheaded and having headache afraid of walking secondary to dizziness and risk of falling that she feels unsteady and leans to the wall for support. Denies fever or chills no chest pain SOB or cough denies nausea or vomiting. She has trouble with her vision that she saw her eye doctor for that couple weeks ago but resolved. PAST MEDICAL HISTORY:    Past Medical History:   Diagnosis Date    DVT (deep venous thrombosis) (HCC)     Hyperlipidemia     Hypertension     Leg pain     Thyroid disease      PAST SURGICAL HISTORY:    Past Surgical History:   Procedure Laterality Date    CARPAL TUNNEL RELEASE      CATARACT REMOVAL      CHOLECYSTECTOMY      CORONARY ANGIOPLASTY WITH STENT PLACEMENT  05/07/2021    Dr Leslye Jensen LAD    FRACTURE SURGERY      HYSTERECTOMY (CERVIX STATUS UNKNOWN)      SKIN CANCER EXCISION      rt arm     FAMILY HISTORY:  No family history on file.   SOCIAL HISTORY:    Social History     Socioeconomic History    Marital status:      Spouse name: Not on file    Number of children: Not on file    Years of education: Not on file    Highest education level: Not on file   Occupational History    Not on file   Tobacco Use    Smoking status: Never    Smokeless tobacco: Never   Vaping Use    Vaping Use: Never used   Substance and Sexual Activity    Alcohol use: No     Comment: drinks 2 cups of coffee daily    Drug use: No    Sexual activity: Not

## 2023-04-27 ENCOUNTER — APPOINTMENT (OUTPATIENT)
Dept: MRI IMAGING | Age: 78
End: 2023-04-27
Payer: MEDICARE

## 2023-04-27 LAB
ALBUMIN SERPL-MCNC: 3.7 G/DL (ref 3.5–5.2)
ALP SERPL-CCNC: 67 U/L (ref 35–104)
ALT SERPL-CCNC: 10 U/L (ref 0–32)
ANION GAP SERPL CALCULATED.3IONS-SCNC: 12 MMOL/L (ref 7–16)
AST SERPL-CCNC: 13 U/L (ref 0–31)
BASOPHILS # BLD: 0.05 E9/L (ref 0–0.2)
BASOPHILS NFR BLD: 0.7 % (ref 0–2)
BILIRUB SERPL-MCNC: 0.4 MG/DL (ref 0–1.2)
BUN SERPL-MCNC: 19 MG/DL (ref 6–23)
CALCIUM SERPL-MCNC: 8.8 MG/DL (ref 8.6–10.2)
CHLORIDE SERPL-SCNC: 107 MMOL/L (ref 98–107)
CO2 SERPL-SCNC: 25 MMOL/L (ref 22–29)
CREAT SERPL-MCNC: 1.4 MG/DL (ref 0.5–1)
EKG ATRIAL RATE: 312 BPM
EKG ATRIAL RATE: 79 BPM
EKG ATRIAL RATE: 94 BPM
EKG Q-T INTERVAL: 370 MS
EKG Q-T INTERVAL: 396 MS
EKG Q-T INTERVAL: 400 MS
EKG QRS DURATION: 86 MS
EKG QRS DURATION: 86 MS
EKG QRS DURATION: 92 MS
EKG QTC CALCULATION (BAZETT): 484 MS
EKG QTC CALCULATION (BAZETT): 489 MS
EKG QTC CALCULATION (BAZETT): 495 MS
EKG R AXIS: 38 DEGREES
EKG R AXIS: 67 DEGREES
EKG R AXIS: 74 DEGREES
EKG T AXIS: -26 DEGREES
EKG T AXIS: -44 DEGREES
EKG T AXIS: -71 DEGREES
EKG VENTRICULAR RATE: 103 BPM
EKG VENTRICULAR RATE: 90 BPM
EKG VENTRICULAR RATE: 94 BPM
EOSINOPHIL # BLD: 0.24 E9/L (ref 0.05–0.5)
EOSINOPHIL NFR BLD: 3.5 % (ref 0–6)
ERYTHROCYTE [DISTWIDTH] IN BLOOD BY AUTOMATED COUNT: 14.6 FL (ref 11.5–15)
GLUCOSE SERPL-MCNC: 79 MG/DL (ref 74–99)
HCT VFR BLD AUTO: 37.7 % (ref 34–48)
HGB BLD-MCNC: 11.8 G/DL (ref 11.5–15.5)
IMM GRANULOCYTES # BLD: 0.02 E9/L
IMM GRANULOCYTES NFR BLD: 0.3 % (ref 0–5)
LYMPHOCYTES # BLD: 1.85 E9/L (ref 1.5–4)
LYMPHOCYTES NFR BLD: 26.7 % (ref 20–42)
MCH RBC QN AUTO: 29.5 PG (ref 26–35)
MCHC RBC AUTO-ENTMCNC: 31.3 % (ref 32–34.5)
MCV RBC AUTO: 94.3 FL (ref 80–99.9)
MONOCYTES # BLD: 0.66 E9/L (ref 0.1–0.95)
MONOCYTES NFR BLD: 9.5 % (ref 2–12)
NEUTROPHILS # BLD: 4.1 E9/L (ref 1.8–7.3)
NEUTS SEG NFR BLD: 59.3 % (ref 43–80)
PLATELET # BLD AUTO: 160 E9/L (ref 130–450)
PMV BLD AUTO: 10.6 FL (ref 7–12)
POTASSIUM SERPL-SCNC: 4.1 MMOL/L (ref 3.5–5)
PROT SERPL-MCNC: 6.3 G/DL (ref 6.4–8.3)
RBC # BLD AUTO: 4 E12/L (ref 3.5–5.5)
SODIUM SERPL-SCNC: 144 MMOL/L (ref 132–146)
T4 FREE SERPL-MCNC: 1.27 NG/DL (ref 0.93–1.7)
TROPONIN, HIGH SENSITIVITY: 20 NG/L (ref 0–9)
TROPONIN, HIGH SENSITIVITY: 20 NG/L (ref 0–9)
TSH SERPL-MCNC: 4.61 UIU/ML (ref 0.27–4.2)
WBC # BLD: 6.9 E9/L (ref 4.5–11.5)

## 2023-04-27 PROCEDURE — 2580000003 HC RX 258: Performed by: INTERNAL MEDICINE

## 2023-04-27 PROCEDURE — 84484 ASSAY OF TROPONIN QUANT: CPT

## 2023-04-27 PROCEDURE — 99223 1ST HOSP IP/OBS HIGH 75: CPT | Performed by: INTERNAL MEDICINE

## 2023-04-27 PROCEDURE — 97535 SELF CARE MNGMENT TRAINING: CPT

## 2023-04-27 PROCEDURE — 93005 ELECTROCARDIOGRAM TRACING: CPT | Performed by: INTERNAL MEDICINE

## 2023-04-27 PROCEDURE — 93010 ELECTROCARDIOGRAM REPORT: CPT | Performed by: INTERNAL MEDICINE

## 2023-04-27 PROCEDURE — 70551 MRI BRAIN STEM W/O DYE: CPT

## 2023-04-27 PROCEDURE — 85025 COMPLETE CBC W/AUTO DIFF WBC: CPT

## 2023-04-27 PROCEDURE — 6370000000 HC RX 637 (ALT 250 FOR IP): Performed by: INTERNAL MEDICINE

## 2023-04-27 PROCEDURE — 97161 PT EVAL LOW COMPLEX 20 MIN: CPT

## 2023-04-27 PROCEDURE — 80053 COMPREHEN METABOLIC PANEL: CPT

## 2023-04-27 PROCEDURE — 84443 ASSAY THYROID STIM HORMONE: CPT

## 2023-04-27 PROCEDURE — 84439 ASSAY OF FREE THYROXINE: CPT

## 2023-04-27 PROCEDURE — APPSS60 APP SPLIT SHARED TIME 46-60 MINUTES

## 2023-04-27 PROCEDURE — 6360000002 HC RX W HCPCS: Performed by: INTERNAL MEDICINE

## 2023-04-27 PROCEDURE — 97530 THERAPEUTIC ACTIVITIES: CPT

## 2023-04-27 PROCEDURE — 36415 COLL VENOUS BLD VENIPUNCTURE: CPT

## 2023-04-27 PROCEDURE — 2060000000 HC ICU INTERMEDIATE R&B

## 2023-04-27 PROCEDURE — 87088 URINE BACTERIA CULTURE: CPT

## 2023-04-27 PROCEDURE — 97165 OT EVAL LOW COMPLEX 30 MIN: CPT

## 2023-04-27 RX ORDER — HYDRALAZINE HYDROCHLORIDE 20 MG/ML
10 INJECTION INTRAMUSCULAR; INTRAVENOUS EVERY 6 HOURS PRN
Status: DISCONTINUED | OUTPATIENT
Start: 2023-04-27 | End: 2023-04-28 | Stop reason: HOSPADM

## 2023-04-27 RX ORDER — METOPROLOL SUCCINATE 25 MG/1
25 TABLET, EXTENDED RELEASE ORAL DAILY
Status: DISCONTINUED | OUTPATIENT
Start: 2023-04-27 | End: 2023-04-28

## 2023-04-27 RX ORDER — AMLODIPINE BESYLATE 5 MG/1
5 TABLET ORAL DAILY
Status: DISCONTINUED | OUTPATIENT
Start: 2023-04-27 | End: 2023-04-27

## 2023-04-27 RX ORDER — LANOLIN ALCOHOL/MO/W.PET/CERES
3 CREAM (GRAM) TOPICAL NIGHTLY PRN
Status: DISCONTINUED | OUTPATIENT
Start: 2023-04-27 | End: 2023-04-28 | Stop reason: HOSPADM

## 2023-04-27 RX ORDER — DILTIAZEM HYDROCHLORIDE 180 MG/1
180 CAPSULE, COATED, EXTENDED RELEASE ORAL DAILY
Status: DISCONTINUED | OUTPATIENT
Start: 2023-04-27 | End: 2023-04-28 | Stop reason: HOSPADM

## 2023-04-27 RX ORDER — CALCIUM CARBONATE 200(500)MG
500 TABLET,CHEWABLE ORAL 3 TIMES DAILY PRN
Status: DISCONTINUED | OUTPATIENT
Start: 2023-04-27 | End: 2023-04-28 | Stop reason: HOSPADM

## 2023-04-27 RX ADMIN — SODIUM CHLORIDE, PRESERVATIVE FREE 10 ML: 5 INJECTION INTRAVENOUS at 18:19

## 2023-04-27 RX ADMIN — ATORVASTATIN CALCIUM 40 MG: 40 TABLET, FILM COATED ORAL at 21:41

## 2023-04-27 RX ADMIN — DILTIAZEM HYDROCHLORIDE 180 MG: 180 CAPSULE, COATED, EXTENDED RELEASE ORAL at 14:35

## 2023-04-27 RX ADMIN — CLOPIDOGREL BISULFATE 75 MG: 75 TABLET ORAL at 08:47

## 2023-04-27 RX ADMIN — METOPROLOL SUCCINATE 25 MG: 25 TABLET, EXTENDED RELEASE ORAL at 08:47

## 2023-04-27 RX ADMIN — AMLODIPINE BESYLATE 5 MG: 5 TABLET ORAL at 12:32

## 2023-04-27 RX ADMIN — HYDRALAZINE HYDROCHLORIDE 10 MG: 20 INJECTION INTRAMUSCULAR; INTRAVENOUS at 10:03

## 2023-04-27 RX ADMIN — SODIUM CHLORIDE, PRESERVATIVE FREE 10 ML: 5 INJECTION INTRAVENOUS at 08:48

## 2023-04-27 RX ADMIN — ACETAMINOPHEN 650 MG: 325 TABLET ORAL at 16:37

## 2023-04-27 RX ADMIN — SODIUM CHLORIDE, PRESERVATIVE FREE 10 ML: 5 INJECTION INTRAVENOUS at 21:41

## 2023-04-27 RX ADMIN — WATER 1000 MG: 1 INJECTION INTRAMUSCULAR; INTRAVENOUS; SUBCUTANEOUS at 18:19

## 2023-04-27 RX ADMIN — APIXABAN 5 MG: 5 TABLET, FILM COATED ORAL at 21:41

## 2023-04-27 RX ADMIN — APIXABAN 5 MG: 5 TABLET, FILM COATED ORAL at 08:47

## 2023-04-27 ASSESSMENT — PAIN DESCRIPTION - LOCATION: LOCATION: HEAD

## 2023-04-27 ASSESSMENT — PAIN SCALES - GENERAL
PAINLEVEL_OUTOF10: 10
PAINLEVEL_OUTOF10: 0

## 2023-04-27 NOTE — CARE COORDINATION
CM transition of care. Pt presented to [facility] ER secondary to lightheadedness and dizziness. Admitted to PICU with ataxia and stroke like symptoms. Neurology on consult. MRI brain pending. Pt/ot evals pending. Met with pt at bedside to discuss d/c planning. Pt lives alone in a single story home. PTA is independent with ADLs and and active . Owns a cane and walker that she does not use. Has used [employer] in the past unable to recall company. No hx of BRENNEN. PCP is Dr Twan Mckeon and uses [facility] on [address] [facility]. Pt plans to return to home with no needs. She has lots of family support. Pt daughter Janie Bobo will provide transport at d/c.  CM/SW to follow.   Hortencia Mosher RN  [phone]

## 2023-04-27 NOTE — PLAN OF CARE
Problem: Discharge Planning  Goal: Discharge to home or other facility with appropriate resources  Outcome: Progressing     Problem: Safety - Adult  Goal: Free from fall injury  4/27/2023 1046 by Jeannie Baca RN  Outcome: Progressing  Flowsheets (Taken 4/27/2023 0800)  Free From Fall Injury: Instruct family/caregiver on patient safety  4/27/2023 0102 by Jessi Alexandra RN  Outcome: Progressing     Problem: ABCDS Injury Assessment  Goal: Absence of physical injury  Outcome: Progressing  Flowsheets (Taken 4/27/2023 0800)  Absence of Physical Injury: Implement safety measures based on patient assessment

## 2023-04-27 NOTE — ACP (ADVANCE CARE PLANNING)
Advance Care Planning   Healthcare Decision Maker:    Primary Decision Maker: Daveyjose Maria Eugenia - Child - [phone]    Secondary Decision Maker: Juan David Rosenthal - Child - [phone]    Click here to complete Healthcare Decision Makers including selection of the Healthcare Decision Maker Relationship (ie \"Primary\"). Today we documented Decision Maker(s) consistent with Legal Next of Kin hierarchy.        If the relationship to the patient does NOT follow our state's Next of Kin hierarchy, the patient MUST complete an ACP Document to allow him/her to act on the patient's behalf. :

## 2023-04-28 VITALS
DIASTOLIC BLOOD PRESSURE: 92 MMHG | SYSTOLIC BLOOD PRESSURE: 136 MMHG | RESPIRATION RATE: 17 BRPM | HEART RATE: 103 BPM | OXYGEN SATURATION: 98 % | TEMPERATURE: 97.6 F

## 2023-04-28 LAB
CHOLESTEROL, FASTING: 117 MG/DL (ref 0–199)
HDLC SERPL-MCNC: 39 MG/DL
LDL CHOLESTEROL CALCULATED: 53 MG/DL (ref 0–99)
METER GLUCOSE: 103 MG/DL (ref 74–99)
TRIGLYCERIDE, FASTING: 127 MG/DL (ref 0–149)
VLDLC SERPL CALC-MCNC: 25 MG/DL

## 2023-04-28 PROCEDURE — 97530 THERAPEUTIC ACTIVITIES: CPT

## 2023-04-28 PROCEDURE — 6370000000 HC RX 637 (ALT 250 FOR IP): Performed by: INTERNAL MEDICINE

## 2023-04-28 PROCEDURE — 97535 SELF CARE MNGMENT TRAINING: CPT

## 2023-04-28 PROCEDURE — 2580000003 HC RX 258: Performed by: INTERNAL MEDICINE

## 2023-04-28 PROCEDURE — 82962 GLUCOSE BLOOD TEST: CPT

## 2023-04-28 PROCEDURE — 36415 COLL VENOUS BLD VENIPUNCTURE: CPT

## 2023-04-28 PROCEDURE — 99232 SBSQ HOSP IP/OBS MODERATE 35: CPT | Performed by: INTERNAL MEDICINE

## 2023-04-28 PROCEDURE — 80061 LIPID PANEL: CPT

## 2023-04-28 RX ORDER — DILTIAZEM HYDROCHLORIDE 180 MG/1
180 CAPSULE, COATED, EXTENDED RELEASE ORAL DAILY
Qty: 30 CAPSULE | Refills: 0 | Status: SHIPPED | OUTPATIENT
Start: 2023-04-29

## 2023-04-28 RX ORDER — CEFDINIR 300 MG/1
300 CAPSULE ORAL 2 TIMES DAILY
Qty: 10 CAPSULE | Refills: 0 | Status: SHIPPED | OUTPATIENT
Start: 2023-04-28 | End: 2023-05-01

## 2023-04-28 RX ORDER — METOPROLOL SUCCINATE 50 MG/1
50 TABLET, EXTENDED RELEASE ORAL DAILY
Status: DISCONTINUED | OUTPATIENT
Start: 2023-04-28 | End: 2023-04-28 | Stop reason: HOSPADM

## 2023-04-28 RX ORDER — METOPROLOL SUCCINATE 50 MG/1
50 TABLET, EXTENDED RELEASE ORAL DAILY
Qty: 30 TABLET | Refills: 0 | Status: SHIPPED | OUTPATIENT
Start: 2023-04-29

## 2023-04-28 RX ADMIN — DILTIAZEM HYDROCHLORIDE 180 MG: 180 CAPSULE, COATED, EXTENDED RELEASE ORAL at 10:19

## 2023-04-28 RX ADMIN — ACETAMINOPHEN 650 MG: 325 TABLET ORAL at 00:20

## 2023-04-28 RX ADMIN — METOPROLOL SUCCINATE 50 MG: 50 TABLET, EXTENDED RELEASE ORAL at 10:19

## 2023-04-28 RX ADMIN — APIXABAN 5 MG: 5 TABLET, FILM COATED ORAL at 10:19

## 2023-04-28 RX ADMIN — SODIUM CHLORIDE, PRESERVATIVE FREE 10 ML: 5 INJECTION INTRAVENOUS at 11:57

## 2023-04-28 RX ADMIN — CLOPIDOGREL BISULFATE 75 MG: 75 TABLET ORAL at 10:20

## 2023-04-28 ASSESSMENT — PAIN DESCRIPTION - LOCATION: LOCATION: HEAD

## 2023-04-28 ASSESSMENT — PAIN SCALES - GENERAL: PAINLEVEL_OUTOF10: 4

## 2023-04-28 ASSESSMENT — PAIN DESCRIPTION - DESCRIPTORS: DESCRIPTORS: ACHING;DISCOMFORT

## 2023-04-28 NOTE — CARE COORDINATION
CM note. Pt remains on PICU. MRI brain completed. Cardiology following. Neurology consult pending. Pt did well with therapy. PT am-pac is 17 and OT is 19. Pt was able to ambulate 15 feet x 2 no AD with Min A. Met with pt. She is still planning to go home at d/c with no needs. Pt daughter will transport. CM/SW to follow. Magan Casas RN [facility] [phone].

## 2023-04-28 NOTE — DISCHARGE SUMMARY
Discharge Summary    Admit date: 4/26/2023    Discharge date and time: No discharge date for patient encounter. Admitting Physician: Alonso Acosta MD     Consultants: cardio, neuro    Admission Diagnoses:  Dizziness    Discharge Diagnoses AND Hospital Course:  ?Stroke like symptoms vs symptomatic atrial fibrillation- patient was having issues with walking due to lightheadedness,dizziness, headache. MRI no acute intracranial abnormality,Small chronic lacunar infarctions in the right frontal subcortical white matter in the left basal ganglia . Has not had any more episodes of dizziness. Follow up with neuro and cardiology, awaiting neuro note. Possible symptomatic atrial fibrillation- she states she is unsure of previous diagnosis of atrial fibrillation but she is on eliquis, and on chart review of previous cardiology note there is document paroxysmal afib. Cardiology. Medications adjusted  Possible UTI- rocephin, await cultures, dc with cefdinir  Coronary artery disease- on eliquis, plavix  Aspirin allergy  Hypertension  CKD stage 3    Discharge Exam:  Vitals:    04/28/23 1131   BP: (!) 136/92   Pulse: (!) 103   Resp: 17   Temp: 97.6 °F (36.4 °C)   SpO2:        General appearance:  awake, alert, and oriented to person, place, time, and purpose; appears stated age and cooperative; no apparent distress no labored breathing  HEENT:  Conjunctivae/corneas clear. Neck: Supple. No jugular venous distention. Respiratory: symmetrical; clear to auscultation bilaterally; no wheezes; no rhonchi; no rales  Cardiovascular: irregularly irregular rate controlled; no murmurs  Abdomen: Soft, nontender, nondistended  Extremities:  peripheral pulses present; no peripheral edema; no ulcers  Musculoskeletal: No clubbing, cyanosis, no bilateral lower extremity edema. Brisk capillary refill.    Skin:  No rashes  on visible skin  Neurologic: awake, alert and following commands     Disposition: home  The patient's condition is

## 2023-04-28 NOTE — CONSULTS
NEUROLOGY CONSULT NOTE      Requesting Physician:  Willie Walters MD    Reason for Consult:  Evaluate for strokelike symptoms. History of Present Illness:  Chastity Ruiz is a 76 y.o. female  with h/o HTN, HLD, DVT on Eliquis, and thyroid disease who was admitted to [facility] on 4/26/2023 with presentation of lightheadedness and dizziness. Onset of symptoms day before presentation when she noticed unsteadiness when walking with feeling that her feet were about to give away under her. She denies any following. She denies any specific event or factors. Over the prior 2 weeks she has been having trouble with blurry vision, which is being followed by her eye doctor. About 3 weeks prior she had acute onset of vision loss bilateral eyes that resolved spontaneously. No postevent sequelae reported with no subsequent similar events. As noted patient does have a history of DVT and was on Eliquis at the time. Past Medical History:        Diagnosis Date    DVT (deep venous thrombosis) (HCC)     Hyperlipidemia     Hypertension     Leg pain     Thyroid disease            Procedure Laterality Date    CARPAL TUNNEL RELEASE      CATARACT REMOVAL      CHOLECYSTECTOMY      CORONARY ANGIOPLASTY WITH STENT PLACEMENT  05/07/2021    Dr Steph Guerra LAD    [address] (CERVIX STATUS UNKNOWN)      SKIN CANCER EXCISION      rt arm       Social History:  Social History     Tobacco Use   Smoking Status Never   Smokeless Tobacco Never     Social History     Substance and Sexual Activity   Alcohol Use No    Comment: drinks 2 cups of coffee daily     Social History     Substance and Sexual Activity   Drug Use No         Family History:   No family history on file. Review of Systems:  Constitutional:  Negative for chills and fever. HENT:  Negative for ear pain, sinus pressure and sore throat. Eyes:  Positive for visual disturbance. Negative for pain, discharge and redness.    Respiratory:
mL, IntraVENous, 2 times per day  sodium chloride flush 0.9 % injection 10 mL, 10 mL, IntraVENous, PRN  0.9 % sodium chloride infusion, , IntraVENous, PRN  magnesium hydroxide (MILK OF MAGNESIA) 400 MG/5ML suspension 30 mL, 30 mL, Oral, Daily PRN  acetaminophen (TYLENOL) tablet 650 mg, 650 mg, Oral, Q6H PRN **OR** acetaminophen (TYLENOL) suppository 650 mg, 650 mg, Rectal, Q6H PRN    Allergies:  Latex, Aspirin, Andrés-allergin [diphenhydramine], Darvocet a500 [propoxyphene n-acetaminophen], Fish-derived products, Honey bee venom [bee venom], Lemon oil, Niacin and related, Nuts [peanut oil], Penicillins, Shellfish-derived products, and Wine [alcohol]    Social History:    Social History     Socioeconomic History    Marital status:      Spouse name: Not on file    Number of children: Not on file    Years of education: Not on file    Highest education level: Not on file   Occupational History    Not on file   Tobacco Use    Smoking status: Never    Smokeless tobacco: Never   Vaping Use    Vaping Use: Never used   Substance and Sexual Activity    Alcohol use: No     Comment: drinks 2 cups of coffee daily    Drug use: No    Sexual activity: Not on file   Other Topics Concern    Not on file   Social History Narrative    Not on file     Social Determinants of Health     Financial Resource Strain: Not on file   Food Insecurity: Not on file   Transportation Needs: Not on file   Physical Activity: Not on file   Stress: Not on file   Social Connections: Not on file   Intimate Partner Violence: Not on file   Housing Stability: Not on file       Family History:   No family history on file. REVIEW OF SYSTEMS:     Constitutional: Denies fevers, chills, night sweats, and fatigue  HEENT: Denies headaches, nose bleeds, and blurred vision,oral pain, abscess or lesion. Musculoskeletal: Denies falls, pain to BLE with ambulation and edema to BLE. Neurological: Denies numbness and tingling.   Exertional nonvertiginous

## 2023-04-29 LAB — BACTERIA UR CULT: NORMAL

## 2023-05-03 ENCOUNTER — TELEPHONE (OUTPATIENT)
Dept: ADMINISTRATIVE | Age: 78
End: 2023-05-03

## 2023-05-10 NOTE — PROGRESS NOTES
MRI screening needs completed, thank you.
Physical Therapy    Initial Assessment     Name: Kathy Babcock  : 1945  MRN: [id number]      Date of Service: 2023    Evaluating PT: Tami Wade, PT, DPT [id number]      Room #:  4501/4501-B  Diagnosis:  Ataxia [R27.0]  Lightheaded [R42]  Stroke-like symptoms [R29.90]  Urinary tract infection without hematuria, site unspecified [N39.0]  PMHx/PSHx:   has a past medical history of DVT (deep venous thrombosis) ([facility] [address] Hyperlipidemia, Hypertension, Leg pain, and Thyroid disease. Precautions:  Fall risk, Monitor BP    SUBJECTIVE:    Pt lives alone in a single story house with level entry. Pt ambulated without AD prior to admission. OBJECTIVE:   Initial Evaluation  Date: 23 Treatment Date: Short Term/ Long Term   Goals   AM-PAC 6 Clicks 62/85     Was pt agreeable to Eval/treatment? Yes     Does pt have pain? Intermittent upper back/shoulder pain; no pain currently     Bed Mobility  Rolling: NT  Supine to sit: SBA  Sit to supine: NT  Scooting: SBA to EOB  Rolling: Independent   Supine to sit: Independent   Sit to supine: Independent   Scooting: Independent    Transfers Sit to stand: SBA  Stand to sit: SBA  Stand pivot: Min A without AD  Sit to stand: Independent   Stand to sit: Independent   Stand pivot: Independent    Ambulation   15 feet x2 without AD with Min A  >200 feet Independent    Stair negotiation: ascended and descended NT  NA   ROM BUE: Refer to OT note  BLE: WFL     Strength BUE: Refer to OT note  BLE: 5/5 grossly     Balance Sitting EOB: Supervision  Dynamic Standing: SBA without AD  Sitting EOB: Independent   Dynamic Standing: Independent      Pt is A & O x: 4 to person, place, month/year, and situation. Sensation: Pt denies numbness and tingling of extremities. Edema: Unremarkable. Orthostatics  Position BP HR   Supine  170/111 mmHg 93 bpm   Sitting  167/104 mmHg 76 bpm   Standing 162/101 mmHg 116 bpm     Patient education  Pt educated on orthostatic hypotension.     Patient response
Physician Progress Note      PATIENT:               Edison Corral  CSN #:                  [id number]  :                       1945  ADMIT DATE:       2023 2:21 PM  [address] DATE:        2023 3:05 PM  RESPONDING  PROVIDER #:        Joseph Crowell DO          QUERY TEXT:    Pt admitted with Dizziness, ? stroke like symptoms vs symptomatic afib. Pt   noted to have possible UTI aslo. If possible, please document in progress   notes and discharge summary if you are evaluating and /or treating any of the   following: The medical record reflects the following:  Risk Factors: Afib, UTI, dizziness  Clinical Indicators: Per DC summary- ?Stroke like symptoms vs symptomatic   atrial fibrillation- patient was having issues with walking due to   lightheadedness,dizziness, headache. MRI no acute intracranial   abnormality,Small chronic lacunar infarctions in the right frontal subcortical   white matter in the left basal ganglia . Has not had any more episodes of   dizziness. Follow up with neuro and cardiology, awaiting neuro note. Possible   symptomatic atrial fibrillation- she states she is unsure of previous   diagnosis of atrial fibrillation but she is on eliquis, and on chart review o  Coronary artery disease- on eliquis, plavix  Treatment: consults, labs, urine, abx, monitoring    Thank you,  Ramone Valdez RN, CRCR  Clinical Documentation Improvement  Options provided:  -- Dizziness due to CVA, TIA ruled out after study  -- Dizziness due to symptomatic Afib, TIA ruled out after study  -- Dizziness due to UTI, TIA ruled out after study  -- Other - I will add my own diagnosis  -- Disagree - Not applicable / Not valid  -- Disagree - Clinically unable to determine / Unknown  -- Refer to Clinical Documentation Reviewer    PROVIDER RESPONSE TEXT:    [facility] Promise due to symptomatic afib, TIA ruled out after study.     Query created by: Sean Jimenez on 5/3/2023 2:41 PM      Electronically signed by:  Sanket Baig
Received phone call from [facility] that pt wandered over there. I went to escort her back to the unit. Pt was shouting that she was leaving now and going home. Doctor called and discharge order obtained. We sat her at the nurses station so she would not wander off. Daughter called to come pickup pt that she was discharged. Pt was agitated and disoriented to her situation.
Daily    cefTRIAXone (ROCEPHIN) IV  1,000 mg IntraVENous Q24H    apixaban  5 mg Oral BID    atorvastatin  40 mg Oral Nightly    clopidogrel  75 mg Oral Daily    sodium chloride flush  10 mL IntraVENous 2 times per day       IV Infusions (if any):   sodium chloride           PHYSICAL EXAM:     CONSTITUTIONAL:   BP (!) 150/86   Pulse 86   Temp 98.9 °F (37.2 °C) (Temporal)   Resp 16   SpO2 96%   Pulse  Av.3  Min: 99  Max: 176  Systolic (84NRP), VXB:577 , Min:150 , BQE:071    Diastolic (22ZEY), PRQ:714, Min:79, Max:115        CONST:  Well developed, appears stated age. Awake, alert and no apparent distress. HEENT:   Head- Normocephalic  Eyes- Conjunctivae pink, no icterus  Throat- Oral mucosa moist  Neck-  No stridor, no jugular venous distention. No carotid bruit. CHEST: Chest symmetrical and non-tender to palpation. No accessory muscle use  RESPIRATORY: Lung sounds - Few rhonchi  CARDIOVASCULAR:     Heart Palpation- No thrills. Heart Ausculation- Irregularly irregular rate and rhythm, 2/6 systolic murmur. No s3 or rub   EXT: + lower extremity edema. Distal pulses palpable, no cyanosis   ABDOMEN: Soft, non-tender to light palpation. Bowel sounds present. No abdominal bruit  MS: N/A   : Deferred  RECTAL: Deferred  SKIN: Warm and dry   NEURO / PSYCH: Oriented to person          IMPRESSION / RECOMMENDATIONS:     Ataxia - Neurology consulted - MRI done     Stroke-like symptoms - MRI done. Neurology consulted     A Fib with RVR - Amlodipine changed to Cardizem, Continue BB - Rates controlled. Continue Eliquis for [address] [place] [address] Road. CAD  - s/p PCI to LAD - Continue Plavix, BB, Statin      HTN  - Monitor BP and adjust meds                No family at bed side  Ok to discharge from cardiac stand point   F/u with Teto Mckeon cardiology 3-4 weeks    Above recommendations discussed with her.       Electronically signed by Eden Torres MD on 2023 at [address] [place] [address] Cardiology
independence  * Recommendation of environmental modifications for increased safety with functional transfers/mobility and ADLs  * Therapeutic exercise to improve motor endurance, ROM, and functional strength for ADLs/functional transfers  * Therapeutic activities to facilitate/challenge dynamic balance, stand tolerance for increased safety and independence with ADLs        OTMRS Modified [place] Scale (MRS)  Score     Description  0             No symptoms  1             No significant disability despite symptoms  2             Slight disability; able to look after own affairs  3             Moderate disability; able to ambulate without assist/ requires assist with ADLs  4             Moderate/Severe disability;requires assist to ambulate/assist with ADLs  5             Severe disability;bedridden/incontinent   6               Score:   4     Recommended Adaptive Equipment: TBD      Home Living: Pt lives alone in [facility] with 0 EKTA. Bathroom setup: [employer] owned: grab bars, w/w, cane     Prior Level of Function: Independent with ADLs , independent with IADLs; ambulated no AD   Driving: Yes   Occupation: Pt enjoys spending time with her family. Pain Level: Pt c/o 0/10 this session, but did verbalize discomfort in upper back through the night. Addressed with repositioning. Cognition: A&O: x4/4;  Follows 1-3 step directions           Memory:  fair +           Sequencing:  fair +           Problem solving:  fair +           Judgement/safety:  fair              Functional Assessment:  AM-PAC Daily Activity Raw Score:     Initial Eval Status  Date: 23 Treatment Status  Date: 23 STGs = LTGs  Time frame: 10-14 days   Feeding Independent  Independent      Grooming Stand by Assist   Hand hygiene standing at the sink  Supervision to wash face, oral hygiene  Modified [place]    UB Dressing Stand by Assist   Donning/doffing gown sitting in chair   Supervision to selena/doff
IntraVENous 2 times per day     PRN Meds: sodium chloride flush, sodium chloride, magnesium hydroxide, acetaminophen **OR** acetaminophen    Labs:     Recent Labs     23  1207 23  0410   WBC 6.3 6.9   HGB 12.7 11.8   HCT 40.7 37.7    160       Recent Labs     23  1207 23  0410    144   K 5.0 4.1    107   CO2 21* 25   BUN 21 19   CREATININE 1.6* 1.4*   CALCIUM 9.2 8.8       Recent Labs     23  1207 23  0410   PROT 6.9 6.3*   ALKPHOS 74 67   ALT 12 10   AST 20 13   BILITOT 0.6 0.4       No results for input(s): INR in the last 72 hours. No results for input(s): Cleatrice Mayfield in the last 72 hours. Chronic labs:    Lab Results   Component Value Date    CHOL 215 (H) 2021    TRIG 319 (H) 2021    HDL 36 2021    LDLCALC 115 (H) 2021    TSH 2.630 2021    INR 0.9 2021    LABA1C 6.2 (H) 2021       Radiology: REVIEWED DAILY    +++++++++++++++++++++++++++++++++++++++++++++++++  Tenna Apgar, DO [employer] -  [place], [place]  +++++++++++++++++++++++++++++++++++++++++++++++++  NOTE: This report was transcribed using voice recognition software. Every effort was made to ensure accuracy; however, inadvertent computerized transcription errors may be present.
additionally includes thorough review of current medical information, gathering information on past medical history/social history and prior level of function, interpretation of standardized testing/informal observation of tasks, assessment of data and development of plan of care and goals.         Isabel Hill, S/OT  Susi, OTR/L #[id number]

## 2023-05-30 ENCOUNTER — TELEPHONE (OUTPATIENT)
Dept: CARDIOLOGY CLINIC | Age: 78
End: 2023-05-30

## 2023-05-31 ENCOUNTER — OFFICE VISIT (OUTPATIENT)
Dept: CARDIOLOGY CLINIC | Age: 78
End: 2023-05-31
Payer: MEDICARE

## 2023-05-31 VITALS
SYSTOLIC BLOOD PRESSURE: 140 MMHG | HEIGHT: 64 IN | OXYGEN SATURATION: 96 % | DIASTOLIC BLOOD PRESSURE: 76 MMHG | BODY MASS INDEX: 33.09 KG/M2 | RESPIRATION RATE: 18 BRPM | WEIGHT: 193.8 LBS | HEART RATE: 83 BPM

## 2023-05-31 DIAGNOSIS — I25.10 CORONARY ARTERY DISEASE, UNSPECIFIED VESSEL OR LESION TYPE, UNSPECIFIED WHETHER ANGINA PRESENT, UNSPECIFIED WHETHER NATIVE OR TRANSPLANTED HEART: Primary | ICD-10-CM

## 2023-05-31 PROCEDURE — 93000 ELECTROCARDIOGRAM COMPLETE: CPT | Performed by: INTERNAL MEDICINE

## 2023-05-31 PROCEDURE — 3077F SYST BP >= 140 MM HG: CPT | Performed by: NURSE PRACTITIONER

## 2023-05-31 PROCEDURE — G8427 DOCREV CUR MEDS BY ELIG CLIN: HCPCS | Performed by: NURSE PRACTITIONER

## 2023-05-31 PROCEDURE — 1090F PRES/ABSN URINE INCON ASSESS: CPT | Performed by: NURSE PRACTITIONER

## 2023-05-31 PROCEDURE — 3078F DIAST BP <80 MM HG: CPT | Performed by: NURSE PRACTITIONER

## 2023-05-31 PROCEDURE — 99212 OFFICE O/P EST SF 10 MIN: CPT | Performed by: NURSE PRACTITIONER

## 2023-05-31 PROCEDURE — G8417 CALC BMI ABV UP PARAM F/U: HCPCS | Performed by: NURSE PRACTITIONER

## 2023-05-31 PROCEDURE — G8400 PT W/DXA NO RESULTS DOC: HCPCS | Performed by: NURSE PRACTITIONER

## 2023-05-31 PROCEDURE — 1123F ACP DISCUSS/DSCN MKR DOCD: CPT | Performed by: NURSE PRACTITIONER

## 2023-05-31 PROCEDURE — 1036F TOBACCO NON-USER: CPT | Performed by: NURSE PRACTITIONER

## 2023-05-31 NOTE — PATIENT INSTRUCTIONS
Continue same medications   Increase activity as tolerated  Change positions slowly  Follow-up with Dr Noam Garcia or ANGELIQUE in 6 months, sooner if any needs arise

## 2023-06-08 ENCOUNTER — OFFICE VISIT (OUTPATIENT)
Dept: NEUROLOGY | Age: 78
End: 2023-06-08
Payer: MEDICARE

## 2023-06-08 VITALS — HEART RATE: 73 BPM | DIASTOLIC BLOOD PRESSURE: 82 MMHG | SYSTOLIC BLOOD PRESSURE: 120 MMHG | OXYGEN SATURATION: 98 %

## 2023-06-08 DIAGNOSIS — I48.20 CHRONIC ATRIAL FIBRILLATION (HCC): ICD-10-CM

## 2023-06-08 DIAGNOSIS — Z86.73 REMOTE HISTORY OF STROKE: ICD-10-CM

## 2023-06-08 DIAGNOSIS — E78.2 MIXED HYPERLIPIDEMIA: ICD-10-CM

## 2023-06-08 DIAGNOSIS — Z87.440 RECENT URINARY TRACT INFECTION: ICD-10-CM

## 2023-06-08 DIAGNOSIS — E03.9 HYPOTHYROIDISM, UNSPECIFIED TYPE: ICD-10-CM

## 2023-06-08 DIAGNOSIS — I10 ESSENTIAL HYPERTENSION: ICD-10-CM

## 2023-06-08 DIAGNOSIS — E11.65 TYPE 2 DIABETES MELLITUS WITH HYPERGLYCEMIA, WITHOUT LONG-TERM CURRENT USE OF INSULIN (HCC): ICD-10-CM

## 2023-06-08 DIAGNOSIS — R29.90 STROKE-LIKE SYMPTOMS: Primary | ICD-10-CM

## 2023-06-08 PROCEDURE — G8400 PT W/DXA NO RESULTS DOC: HCPCS | Performed by: NURSE PRACTITIONER

## 2023-06-08 PROCEDURE — 1036F TOBACCO NON-USER: CPT | Performed by: NURSE PRACTITIONER

## 2023-06-08 PROCEDURE — 3079F DIAST BP 80-89 MM HG: CPT | Performed by: NURSE PRACTITIONER

## 2023-06-08 PROCEDURE — 1090F PRES/ABSN URINE INCON ASSESS: CPT | Performed by: NURSE PRACTITIONER

## 2023-06-08 PROCEDURE — 99205 OFFICE O/P NEW HI 60 MIN: CPT | Performed by: NURSE PRACTITIONER

## 2023-06-08 PROCEDURE — G8417 CALC BMI ABV UP PARAM F/U: HCPCS | Performed by: NURSE PRACTITIONER

## 2023-06-08 PROCEDURE — 3074F SYST BP LT 130 MM HG: CPT | Performed by: NURSE PRACTITIONER

## 2023-06-08 PROCEDURE — 1123F ACP DISCUSS/DSCN MKR DOCD: CPT | Performed by: NURSE PRACTITIONER

## 2023-06-08 PROCEDURE — G8427 DOCREV CUR MEDS BY ELIG CLIN: HCPCS | Performed by: NURSE PRACTITIONER

## 2023-06-08 NOTE — PROGRESS NOTES
[address]. Fahad Vu M.D., F.A.C.P. Luis Antonio Jones, DNP, APRN, CNS  Veronica Landry. Montserrat Teran, MSN, APRN-FNP-C  Robbie Murray MSN, APRN, FNP-C  Charles BUCKLEY PA-C  [address] MSN, APRN, FNP-C  Zhou Ortega, MSN, APRN, FNP-BC  [address], [facility]  [place], [address] [address]  Phone: [phone]  Fax: [phone]       Olu Chapman is a 76 y.o. right handed female     Patient presents to neurology clinic today for hospital follow-up    Patient presents alone and is deemed a decent historian    Past Medical History:     Past Medical History:   Diagnosis Date    A-fib [facility])     DVT (deep venous thrombosis) ([facility] [address]     History of stroke     Hyperlipidemia     Hypertension     Leg pain     NSTEMI (non-ST elevated myocardial infarction) [facility])     Thyroid disease         Past Surgical History:       Past Surgical History:   Procedure Laterality Date    Zeinab Gary Right     Dr Donita Ryan-- May 2023    [address]  05/07/2021    [address] Deandre Gamboa LAD    [address] (CERVIX STATUS UNKNOWN)      SKIN CANCER EXCISION      rt arm       Allergies:       Latex, Aspirin, Andrés-allergin [diphenhydramine], Darvocet a500 [propoxyphene n-acetaminophen], Fish-derived products, Honey bee venom [bee venom], Lemon oil, Niacin and related, Nuts [peanut oil], Penicillins, Shellfish-derived products, and Wine [alcohol]    Medications:     Prior to Admission medications    Medication Sig Start Date End Date Taking?  Authorizing Provider   apixaban (ELIQUIS) 5 MG TABS tablet Take 1 tablet by mouth 2 times daily 5/3/23  Yes Luis Peterson MD   metoprolol succinate (TOPROL XL) 50 MG extended release tablet Take 1 tablet by mouth daily 4/29/23  Yes [address],    dilTIAZem (CARDIZEM CD) 180 MG extended release capsule Take 1 capsule by mouth daily 4/29/23  Yes [address], DO

## 2024-02-28 ENCOUNTER — TELEPHONE (OUTPATIENT)
Dept: CARDIOLOGY CLINIC | Age: 79
End: 2024-02-28

## 2024-02-28 NOTE — TELEPHONE ENCOUNTER
Fifi MONTERO,    Patients' daughter called and said she was told by her PCP that, her mother is in Active Afib?   Dr. Soto is not on Schedule, and she has seen you previously?    Please Advise  TEJ

## 2024-04-10 ENCOUNTER — OFFICE VISIT (OUTPATIENT)
Dept: CARDIOLOGY CLINIC | Age: 79
End: 2024-04-10
Payer: MEDICARE

## 2024-04-10 VITALS
SYSTOLIC BLOOD PRESSURE: 124 MMHG | HEART RATE: 83 BPM | WEIGHT: 197.6 LBS | HEIGHT: 64 IN | DIASTOLIC BLOOD PRESSURE: 82 MMHG | RESPIRATION RATE: 16 BRPM | BODY MASS INDEX: 33.73 KG/M2

## 2024-04-10 DIAGNOSIS — I25.10 CORONARY ARTERY DISEASE, UNSPECIFIED VESSEL OR LESION TYPE, UNSPECIFIED WHETHER ANGINA PRESENT, UNSPECIFIED WHETHER NATIVE OR TRANSPLANTED HEART: Primary | ICD-10-CM

## 2024-04-10 PROCEDURE — 93000 ELECTROCARDIOGRAM COMPLETE: CPT | Performed by: INTERNAL MEDICINE

## 2024-04-10 NOTE — PROGRESS NOTES
LABGLOM 32 04/26/2023 12:07 PM    CALCIUM 8.8 04/27/2023 04:10 AM    CALCIUM 9.2 04/26/2023 12:07 PM     TFT:   Lab Results   Component Value Date    TSH 4.610 (H) 04/27/2023    T4FREE 1.27 04/27/2023      HgA1c:   Lab Results   Component Value Date    LABA1C 6.2 (H) 05/06/2021     proBNP:   Lab Results   Component Value Date/Time    PROBNP 2,811 10/10/2022 01:32 AM    PROBNP 1,717 12/03/2021 12:59 PM    PROBNP 466 05/05/2021 10:11 PM     FASTING LIPID PANEL:  Lab Results   Component Value Date/Time    CHOL 215 05/06/2021 01:22 AM    HDL 39 04/28/2023 04:54 AM    LDLCALC 53 04/28/2023 04:54 AM    TRIG 319 05/06/2021 01:22 AM       ASSESSMENT  Persistent AF CVR on Cardizem and Toprol XL  OAC with Eliquis 5 mg BID  CAD s/p PCI to LAD in 2021 on Plavix  Mild MR/TR with EF 64% on TTE 5/2022 (with enlarged LA)  HTN: controlled  HLD on Lipitor  Hx LLE DVT in 2022   Hx hypothyroidism  COVID-19 pneumonia 12/2021  Lacunar infarcts noted on MRI brain 4/2023  BMI 33    PLAN:  Continue same medications  Increase activity as tolerated  Weight loss  Low fat, low cholesterol no added salt diet reinforced.   Follow-up with Dr Brittany MERINO in 1 years, sooner of any needs arise    Electronically signed by WINSTON KAPLAN on 4/10/2024 at 11:11 AM

## 2024-04-10 NOTE — PATIENT INSTRUCTIONS
Follow-up with Dr Soto or ANGELIQUE in one year, sooner if any needs arise  Continue same medications

## 2024-10-03 ENCOUNTER — TELEPHONE (OUTPATIENT)
Dept: CARDIOLOGY CLINIC | Age: 79
End: 2024-10-03

## 2024-10-03 NOTE — TELEPHONE ENCOUNTER
Dr. Kidd is calling asking if patient can go off plavix and start ASA.     Please advise   [phone]

## 2024-12-26 NOTE — PROGRESS NOTES
INPATIENT CARDIOLOGY FOLLOW-UP    Name: Flip Nielson    Age: 66 y.o. Date of Admission: 5/5/2021  9:55 PM    Date of Service: 5/8/2021    Chief Complaint: Follow-up for NSTEMI  Interim History:  No new overnight cardiac complaints. Currently with no complaints of CP, SOB, palpitations, dizziness, or lightheadedness. SR on telemetry. Review of Systems:   Cardiac: As per HPI  General: No fever, chills  Pulmonary: As per HPI  HEENT: No visual disturbances, difficult swallowing  GI: No nausea, vomiting  Endocrine: No thyroid disease or DM  Musculoskeletal: STEINBERG x 4, no focal motor deficits  Skin: Intact, no rashes  Neuro/Psych: No headache or seizures    Problem List:  Patient Active Problem List   Diagnosis    CKD (chronic kidney disease)    Heart murmur    Chest pain, atypical    Essential hypertension    Aspirin allergy    Allergy to statin medication    Non-rheumatic mitral regurgitation    Pericardial effusion    Class 1 obesity in adult    Edema of both feet    Chest pain    Metabolic acidosis    Allergy to multiple drugs    Hypothyroid    Grade I diastolic dysfunction    HLD (hyperlipidemia)    NSTEMI (non-ST elevated myocardial infarction) (HCC)    Shortness of breath       Allergies:   Allergies   Allergen Reactions    Latex     Aspirin     Andrés-Allergin [Diphenhydramine]     Darvocet A500 [Propoxyphene N-Acetaminophen]     Fish-Derived Products     Honey Bee Venom [Bee Venom]     Lemon Oil     Niacin And Related     Nuts [Peanut Oil]     Penicillins     Shellfish-Derived Products     Wine [Alcohol]      Beer, wine and diet pop       Current Medications:  Current Facility-Administered Medications   Medication Dose Route Frequency Provider Last Rate Last Admin    metoprolol succinate (TOPROL XL) extended release tablet 50 mg  50 mg Oral Daily Betito Whitt MD   50 mg at 05/08/21 0847    sodium chloride flush 0.9 % injection 5-40 mL  5-40 mL Intravenous 2 times per day Tracy Shea MD   10 mL at 05/08/21 0847    sodium chloride flush 0.9 % injection 5-40 mL  5-40 mL Intravenous PRN Tracy Shea MD        0.9 % sodium chloride infusion  25 mL Intravenous PRN Tracy Shea MD        0.9 % sodium chloride infusion   Intravenous Continuous Tracy Shea MD   Stopped at 05/08/21 0847    ticagrelor (BRILINTA) tablet 90 mg  90 mg Oral BID Tracy Shea MD   90 mg at 05/08/21 0847    isosorbide mononitrate (IMDUR) extended release tablet 30 mg  30 mg Oral Daily Tracy Shea MD   30 mg at 05/08/21 0847    nitroGLYCERIN (NITROSTAT) SL tablet 0.4 mg  0.4 mg Sublingual Q5 Min PRN Tracy Shea MD        hydrOXYzine (VISTARIL) capsule 25 mg  25 mg Oral Q6H PRN Keely Garcia MD        amLODIPine (NORVASC) tablet 10 mg  10 mg Oral Daily Tracy Shea MD   10 mg at 05/08/21 0847    promethazine (PHENERGAN) tablet 12.5 mg  12.5 mg Oral Q6H PRN Tracy hSea MD        Or    ondansetron [facility]) injection 4 mg  4 mg Intravenous Q6H PRN Tracy Shea MD        polyethylene glycol (GLYCOLAX) packet 17 g  17 g Oral Daily PRN Tracy Shea MD        atorvastatin (LIPITOR) tablet 40 mg  40 mg Oral Nightly Tracy Shea MD   40 mg at 05/07/21 2116      sodium chloride      sodium chloride Stopped (05/08/21 0847)       Physical Exam:  BP (!) 148/87   Pulse 85   Temp 98.1 °F (36.7 °C) (Temporal)   Resp 16   Ht 5' 4\" (1.626 m)   Wt 218 lb (98.9 kg)   SpO2 95%   BMI 37.42 kg/m²   Wt Readings from Last 3 Encounters:   05/07/21 218 lb (98.9 kg)   04/18/19 218 lb (98.9 kg)   09/05/18 209 lb (94.8 kg)     Appearance: Awake, alert, no acute respiratory distress  Skin: Intact, no rash  Head: Normocephalic, atraumatic  Eyes: EOMI, no conjunctival erythema  ENMT: No pharyngeal erythema, MMM, no rhinorrhea  Neck: Supple, no elevated JVP, no carotid bruits  Lungs: Clear to auscultation bilaterally. No wheezes, rales, or rhonchi.   Cardiac: Regular rate and rhythm, +S1S2, no murmurs apparent  Abdomen: Soft, nontender, +bowel sounds  Extremities: Moves all extremities x 4, no lower extremity edema. Arteriotomy site over the right wrist looks clean dry without any hematoma small ecchymosis with good distal pulses. Neurologic: No focal motor deficits apparent, normal mood and affect  Peripheral Pulses: Intact posterior tibial pulses bilaterally    Intake/Output:    Intake/Output Summary (Last 24 hours) at 5/8/2021 1400  Last data filed at 5/8/2021 0913  Gross per 24 hour   Intake 893 ml   Output --   Net 893 ml     I/O this shift:   In: 893 [P.O.:360; I.V.:533]  Out: -     Laboratory Tests:  Recent Labs     05/05/21 2211 05/06/21  0636    141   K 5.0 4.0    105   CO2 21* 25   BUN 18 16   CREATININE 1.0 1.0   GLUCOSE 168* 133*   CALCIUM 8.9 9.1     No results found for: MG  Recent Labs     05/05/21 2211   ALKPHOS 81   ALT 12   AST 22   PROT 7.1   BILITOT <0.2   LABALBU 4.0     Recent Labs     05/05/21 2211 05/06/21  1217 05/08/21  0609   WBC 7.5 7.3 11.8*   RBC 4.08 4.43 4.12   HGB 12.3 13.3 12.5   HCT 38.3 41.0 37.6   MCV 93.9 92.6 91.3   MCH 30.1 30.0 30.3   MCHC 32.1 32.4 33.2   RDW 13.8 13.6 13.9    196 193   MPV 9.6 9.5 9.9     Lab Results   Component Value Date    CKTOTAL 54 05/06/2021    CKMB 4.7 (H) 05/06/2021    TROPONINI 0.06 (H) 05/06/2021    TROPONINI 0.07 (H) 05/06/2021    TROPONINI 0.09 (H) 05/06/2021     Lab Results   Component Value Date    INR 0.9 05/05/2021    PROTIME 10.2 05/05/2021     Lab Results   Component Value Date    TSH 2.630 05/06/2021     Lab Results   Component Value Date    LABA1C 6.2 (H) 05/06/2021     No results found for: EAG  Lab Results   Component Value Date    CHOL 215 (H) 05/06/2021     Lab Results   Component Value Date    TRIG 319 (H) 05/06/2021     Lab Results   Component Value Date    HDL 36 05/06/2021     Lab Results   Component Value Date    LDLCALC 115 (H) 05/06/2021     Lab Results   Component Value Date    LABVLDL 64 05/06/2021     No results found for: CHOLHDLRATIO    Cardiac Tests:  ECG:       Telemetry findings reviewed: Normal sinus rhythm with heart rate in the 70s without any arrhythmias overnight. Vitals and labs were reviewed: Blood pressure 148/87 heart rate of 85 sats 94% on room air. WBC 11.8 hemoglobin 12.5    Echocardiogram-2/2/2017:   Left ventricular size and wall motion normal, EF 65%. Stage 1 diastolic dysfunction. Left atrium is of normal size. Interatrial septum not well visualized but appears intact. Normal right ventricle structure and function. Normal mitral valve structure. Trace of mitral regurgitation. No mitral valve prolapse. Normal aortic valve structure and function. Normal tricuspid valve structure. There is mild tricuspid regurgitation. Mild pulmonary hypertension, RVSP 44mmHg. Normal aortic root and ascending aorta. Small pericardial effusion without tamponade. No intracardiac mass. Technically sub-optimal images. Stress test:        Cardiac catheterization 5/7/2021:   Findings:   Left main: 0% stenosis  LAD: 90 %   stenosis  Circumflex: 70%   stenosis. RCA: S mall, non dominant. 99 %  stenosis. LV angio: 45 - 50% with apical hypokinesis        Hemodynamics: Ao: 69/47 ( 59 )  LV: 102  LVEDP: 4     Interventional procedure:   1. PCI vessel: LAD. Lesion type: C. KIRK II flow pre PCI. Angioplasty performed with 2.5 mm balloon. Stenting performed with 2.75 mm HO. Result: 0% residual stenosis and KIRK III distal flow.         Drugs: . Anticoagulation was maintained with IV Heparin. Brilinta 180 mg load given  in cath lab. ASSESSMENT:  · CAD status post NSTEMI, status post PCI/HO to LAD-5/7/2021 stable  · Hypertension, blood pressure slightly elevated this morning  · Hyperlipidemia on statin  · Mild pulmonary hypertension RVSP 44 mmHg. · Allergic to aspirin >> Hives. Plan:   · Continue antiplatelet therapy with Brilinta.   Aspirin was not given due to allergies. · Continue amlodipine, Imdur and Toprol-XL  · Continue statin therapy with atorvastatin 40 mg p.o. daily  · Check BMP now. If BMP comes back normal then she is stable for discharge from a cardiology standpoint. · Will sign off, please call us if you have any questions or concerns. · Follow-up with Dr. Todd Hall in 1 to 2 weeks. Lizbeth Garrido MD., Hu Boyd.   [facility]) Cardiology room air

## 2025-03-12 ENCOUNTER — TELEPHONE (OUTPATIENT)
Dept: CARDIOLOGY CLINIC | Age: 80
End: 2025-03-12